# Patient Record
Sex: MALE | Race: WHITE | NOT HISPANIC OR LATINO | Employment: UNEMPLOYED | URBAN - NONMETROPOLITAN AREA
[De-identification: names, ages, dates, MRNs, and addresses within clinical notes are randomized per-mention and may not be internally consistent; named-entity substitution may affect disease eponyms.]

---

## 2022-08-08 ENCOUNTER — OFFICE VISIT (OUTPATIENT)
Dept: URGENT CARE | Facility: CLINIC | Age: 62
End: 2022-08-08
Payer: MEDICARE

## 2022-08-08 VITALS
SYSTOLIC BLOOD PRESSURE: 164 MMHG | HEART RATE: 98 BPM | OXYGEN SATURATION: 99 % | WEIGHT: 163 LBS | RESPIRATION RATE: 18 BRPM | DIASTOLIC BLOOD PRESSURE: 98 MMHG | TEMPERATURE: 98.5 F

## 2022-08-08 DIAGNOSIS — L02.511 ABSCESS OF RIGHT RING FINGER: Primary | ICD-10-CM

## 2022-08-08 PROCEDURE — 99203 OFFICE O/P NEW LOW 30 MIN: CPT

## 2022-08-08 PROCEDURE — 87205 SMEAR GRAM STAIN: CPT

## 2022-08-08 PROCEDURE — 10060 I&D ABSCESS SIMPLE/SINGLE: CPT

## 2022-08-08 PROCEDURE — 87070 CULTURE OTHR SPECIMN AEROBIC: CPT

## 2022-08-08 PROCEDURE — 87106 FUNGI IDENTIFICATION YEAST: CPT

## 2022-08-08 PROCEDURE — G0463 HOSPITAL OUTPT CLINIC VISIT: HCPCS

## 2022-08-08 PROCEDURE — 87077 CULTURE AEROBIC IDENTIFY: CPT

## 2022-08-08 PROCEDURE — 87186 SC STD MICRODIL/AGAR DIL: CPT

## 2022-08-08 RX ORDER — CEPHALEXIN 500 MG/1
500 CAPSULE ORAL EVERY 8 HOURS SCHEDULED
Qty: 30 CAPSULE | Refills: 0 | Status: SHIPPED | OUTPATIENT
Start: 2022-08-08 | End: 2022-08-18

## 2022-08-08 RX ORDER — LIDOCAINE HYDROCHLORIDE 10 MG/ML
5 INJECTION, SOLUTION EPIDURAL; INFILTRATION; INTRACAUDAL; PERINEURAL ONCE
Status: COMPLETED | OUTPATIENT
Start: 2022-08-08 | End: 2022-08-08

## 2022-08-08 RX ADMIN — LIDOCAINE HYDROCHLORIDE 5 ML: 10 INJECTION, SOLUTION EPIDURAL; INFILTRATION; INTRACAUDAL; PERINEURAL at 13:28

## 2022-08-08 NOTE — PROGRESS NOTES
[facility]        NAME: Eloy Hemphill is a 62 y o  male  : 1960    MRN: [id number]  DATE: 2022  TIME: 1:42 PM    Assessment and Plan   Abscess of right ring finger [L02 511]  1  Abscess of right ring finger  cephalexin (KEFLEX) 500 mg capsule    Wound culture and Gram stain    lidocaine (PF) (XYLOCAINE-MPF) 1 % injection 5 mL    Ambulatory Referral to Hand Surgery      I&D performed  Wound culture sent    Patient Instructions     Take the antibiotics with food  Take a probiotics or eat yogurt with live cultures to promote gut health  Keep the area clean and dry  Follow up with general surgery  Incision and drain    Date/Time: 2022 1:40 PM  Performed by: ZAID Bhakta  Authorized by: ZAID Pimentel   Universal Protocol:  Risks and benefits: risks, benefits and alternatives were discussed  Consent given by: patient  Time out: Immediately prior to procedure a "time out" was called to verify the correct patient, procedure, equipment, support staff and site/side marked as required  Patient location:  Bedside  Location:     Type:  Abscess    Size:  2x2     Location:  Upper extremity    Upper extremity location:  R ring finger  Pre-procedure details:     Skin preparation:  Betadine  Procedure details:     Complexity:  Simple    Incision types:  Stab incision    Scalpel blade:  11    Approach:  Puncture    Incision depth:  Subcutaneous    Wound management:  Irrigated with saline    Drainage:  Purulent    Drainage amount:  Copious    Wound treatment:  Wound left open  Post-procedure details:     Patient tolerance of procedure: Tolerated well, no immediate complications        Follow up with PCP in 3-5 days  Proceed to  ER if symptoms worsen      Chief Complaint     Chief Complaint   Patient presents with    Abscess     Abscess like area noted to right hand x1 finger         History of Present Illness       Patient is a 61YOM presenting with an abscess on his right ring finger  He states he had a cyst in the same spot but over the last 3 weeks he has had increased redness, swelling and drainage  He denies any fever, chills, numbness or tingling  He has been taking Dual Action tylenol with minor relief  He denies any injury  Denies any history of MRSA  Abscess  Associated symptoms include arthralgias and joint swelling  Pertinent negatives include no chills, fatigue, fever, headaches, numbness or weakness  Review of Systems   Review of Systems   Constitutional: Negative for activity change, appetite change, chills, fatigue and fever  Musculoskeletal: Positive for arthralgias and joint swelling  Skin: Positive for wound  Neurological: Negative for weakness, numbness and headaches  All other systems reviewed and are negative  Current Medications       Current Outpatient Medications:     cephalexin (KEFLEX) 500 mg capsule, Take 1 capsule (500 mg total) by mouth every 8 (eight) hours for 10 days, Disp: 30 capsule, Rfl: 0  No current facility-administered medications for this visit  Current Allergies     Allergies as of 08/08/2022    (Not on File)            The following portions of the patient's history were reviewed and updated as appropriate: allergies, current medications, past family history, past medical history, past social history, past surgical history and problem list      No past medical history on file  No past surgical history on file  No family history on file  Medications have been verified  Objective   There were no vitals taken for this visit  Physical Exam     Physical Exam  Vitals and nursing note reviewed  Constitutional:       General: He is not in acute distress  Appearance: Normal appearance  He is normal weight  He is not ill-appearing or toxic-appearing  HENT:      Mouth/Throat:      Pharynx: Oropharynx is clear  Cardiovascular:      Rate and Rhythm: Normal rate and regular rhythm        Pulses: Normal pulses  Heart sounds: Normal heart sounds  Pulmonary:      Effort: Pulmonary effort is normal       Breath sounds: Normal breath sounds  Musculoskeletal:         General: Normal range of motion  Skin:     Capillary Refill: Capillary refill takes less than 2 seconds  Findings: Abscess (erythematous, edematous area between the distal interphalangeal joint and the proximal interphalangeal joint with approximate 1x1 area of fluctuance  no drainage ) present  Neurological:      General: No focal deficit present  Mental Status: He is alert and oriented to person, place, and time

## 2022-08-08 NOTE — PATIENT INSTRUCTIONS
Take the antibiotics with food  Take a probiotics or eat yogurt with live cultures to promote gut health  Keep the area clean and dry  Follow up with general surgery

## 2022-08-12 ENCOUNTER — TELEPHONE (OUTPATIENT)
Dept: URGENT CARE | Facility: MEDICAL CENTER | Age: 62
End: 2022-08-12

## 2022-08-12 ENCOUNTER — OFFICE VISIT (OUTPATIENT)
Dept: URGENT CARE | Facility: CLINIC | Age: 62
End: 2022-08-12
Payer: COMMERCIAL

## 2022-08-12 VITALS
HEART RATE: 91 BPM | DIASTOLIC BLOOD PRESSURE: 92 MMHG | OXYGEN SATURATION: 100 % | TEMPERATURE: 98.1 F | RESPIRATION RATE: 16 BRPM | SYSTOLIC BLOOD PRESSURE: 149 MMHG

## 2022-08-12 DIAGNOSIS — L02.511 ABSCESS OF FINGER, RIGHT: Primary | ICD-10-CM

## 2022-08-12 DIAGNOSIS — L72.3 SEBACEOUS CYST OF FINGER: ICD-10-CM

## 2022-08-12 DIAGNOSIS — B37.2 YEAST INFECTION OF THE SKIN: Primary | ICD-10-CM

## 2022-08-12 LAB
BACTERIA WND AEROBE CULT: ABNORMAL
BACTERIA WND AEROBE CULT: ABNORMAL
GRAM STN SPEC: ABNORMAL
GRAM STN SPEC: ABNORMAL

## 2022-08-12 PROCEDURE — 99214 OFFICE O/P EST MOD 30 MIN: CPT

## 2022-08-12 PROCEDURE — 10060 I&D ABSCESS SIMPLE/SINGLE: CPT

## 2022-08-12 RX ORDER — FLUCONAZOLE 200 MG/1
200 TABLET ORAL DAILY
Qty: 7 TABLET | Refills: 0 | Status: SHIPPED | OUTPATIENT
Start: 2022-08-12 | End: 2022-08-19

## 2022-08-12 RX ORDER — SULFAMETHOXAZOLE AND TRIMETHOPRIM 800; 160 MG/1; MG/1
1 TABLET ORAL EVERY 12 HOURS SCHEDULED
Qty: 14 TABLET | Refills: 0 | Status: SHIPPED | OUTPATIENT
Start: 2022-08-12 | End: 2022-08-19

## 2022-08-12 NOTE — TELEPHONE ENCOUNTER
The wound culture grew yeast  I placed him on keflex on his initial visit  He was seen again today and had an I&D  He was placed on Bactrim  The culture was sensitive to Keflex and Bactrim  I explained to the patient that he needs to be covered with an antifungal medication in addition to the Bactrim  He verbalized understanding

## 2022-08-12 NOTE — PROGRESS NOTES
[facility]        NAME: Anjana Sena is a 62 y o  male  : 1960    MRN: [id number]  DATE: 2022  TIME: 2:05 PM    Assessment and Plan   Abscess of finger, right [L02 511]  1  Abscess of finger, right  sulfamethoxazole-trimethoprim (BACTRIM DS) 800-160 mg per tablet   2  Sebaceous cyst of finger  Ambulatory Referral to General Surgery     MDM:  Return of abscess on finger, wants incision was made to drain, tract was found and straight line made into sebaceous cyst to drain all purulent material     Bradley Sheppard  NP called with results of wound culture stating that it grew fungus, specifically Candida, and she was going to prescribe fluconazole orally and call the patient  Arben Trinidad will tell the patient to stay on Bactrim and take fluconazole together  Patient Instructions     Abscess on right hand 4th digit  - Take 1 tablet of TMP/SMX 2x a day with food for the next 7 days  - You have been given an antibiotic, which a common side effect is diarrhea  Eat yogurt, fresh fruits and veggies, increase daily fiber intake, take probiotics, or try kombucha    - Monitor for signs of infection, which include: redness, swelling, pustules, warmth to touch, you develop a fever, pain increases, you see purple dots/bumps on skin or bleeding under skin, there is crackling under skin  Call the office should these be present  - Prevent skin infections by washing hands frequently, not scratching at scabs, cleaning wounds when you get them, and not sharing personal items such as razors  Sebaceous cyst  - Follow up with general surgeon for consideration of removal      Follow up with PCP in 3-5 days  Proceed to  ER if symptoms worsen      Chief Complaint     Chief Complaint   Patient presents with    Abscess     Right finger  Drained at [facility] on Monday         History of Present Illness     Anjana Sena is a 62 y o  male who presents with complaint of continued right hand 4th digit finger pain after being seen here 4 days ago when he received abscess drainage via I&D with bleed  He states that he is taking cephalexin 3 times a day, and has taken his medications as prescribed  He reports a sebaceous cyst located adjacent, medially, to the abscess and infection  He reports that he is  to the palpation  He denies fevers, chills, night sweats, paresthesias, weakness  Noticed that his sebaceous cyst has also grown in size  Review of Systems   Review of Systems   Constitutional: Negative for chills, fatigue and fever  Respiratory: Negative for cough and shortness of breath  Cardiovascular: Negative for chest pain and palpitations  Gastrointestinal: Negative for abdominal pain, constipation, diarrhea, nausea and vomiting  Skin: Positive for color change and wound  Negative for pallor and rash  Neurological: Negative for headaches  All other systems reviewed and are negative  Current Medications       Current Outpatient Medications:     sulfamethoxazole-trimethoprim (BACTRIM DS) 800-160 mg per tablet, Take 1 tablet by mouth every 12 (twelve) hours for 7 days, Disp: 14 tablet, Rfl: 0    cephalexin (KEFLEX) 500 mg capsule, Take 1 capsule (500 mg total) by mouth every 8 (eight) hours for 10 days, Disp: 30 capsule, Rfl: 0    fluconazole (DIFLUCAN) 200 mg tablet, Take 1 tablet (200 mg total) by mouth daily for 7 days, Disp: 7 tablet, Rfl: 0    Current Allergies     Allergies as of 08/12/2022    (No Known Allergies)            The following portions of the patient's history were reviewed and updated as appropriate: allergies, current medications, past family history, past medical history, past social history, past surgical history and problem list      Past Medical History:   Diagnosis Date    Known health problems: none        Past Surgical History:   Procedure Laterality Date    COLON SURGERY         History reviewed  No pertinent family history        Medications have been verified  Objective   /92   Pulse 91   Temp 98 1 °F (36 7 °C)   Resp 16   SpO2 100%   No LMP for male patient  Physical Exam     Physical Exam  Vitals and nursing note reviewed  Constitutional:       General: He is awake  He is not in acute distress  Appearance: Normal appearance  He is well-developed, well-groomed and normal weight  He is not ill-appearing  Cardiovascular:      Rate and Rhythm: Normal rate and regular rhythm  Heart sounds: Normal heart sounds  Pulmonary:      Effort: Pulmonary effort is normal       Breath sounds: Normal breath sounds  No wheezing, rhonchi or rales  Musculoskeletal:      Right wrist: Normal  No tenderness  Normal range of motion  Left wrist: Normal  No tenderness  Normal range of motion  Right hand: Swelling and tenderness present  No bony tenderness  Normal range of motion  Normal strength  Normal sensation  Normal capillary refill  Normal pulse  Left hand: Normal  No tenderness or bony tenderness  Normal range of motion  Normal strength  Normal sensation  Normal capillary refill  Normal pulse  Hands:    Neurological:      Mental Status: He is alert  Psychiatric:         Behavior: Behavior is cooperative  Incision and drain    Date/Time: 8/12/2022 1:53 PM  Performed by: Gila Robles PA-C  Authorized by: Gila Robles PA-C   Universal Protocol:  Consent: Verbal consent obtained    Risks and benefits: risks, benefits and alternatives were discussed  Consent given by: patient  Patient understanding: patient states understanding of the procedure being performed  Patient consent: the patient's understanding of the procedure matches consent given  Patient identity confirmed: verbally with patient      Patient location:  Clinic  Location:     Type:  Abscess    Size:  1cm    Location:  Upper extremity    Upper extremity location:  R ring finger  Pre-procedure details:     Skin preparation:  Betadine Skin preparation:  Alcohol prep pad for anesthesia, then betadine wipe to area that would receive cuts  Anesthesia (see MAR for exact dosages): Anesthesia method:  Nerve block    Needle gauge: 21G  Block anesthetic:  Lidocaine 1% WITH epi    Block technique:  Right hand 4th digit nerve block    Block injection procedure:  Introduced needle, incremental injection and anatomic landmarks palpated    Block outcome:  Anesthesia achieved  Procedure details:     Complexity:  Simple    Needle aspiration: no      Incision types:  Cruciate and single straight (single cut to abscess site, however tracking with noted into the epidermal cyst, so line extended into cyst wall to express purulent material and care 10 inside, two perpendicular cruciate cuts were made to further assist in expiring material )    Scalpel blade:  11    Approach:  Open    Incision depth:  Dermal    Wound management:  Probed and deloculated and irrigated with saline    Drainage:  Bloody and purulent    Drainage amount:  Scant    Wound treatment:  Wound left open    Packing materials:  None  Post-procedure details:     Patient tolerance of procedure: Tolerated well, no immediate complications  Comments:      Nerve block administered by applying tourniquet to right hand 4th digit and using 2 cc of lidocaine with epi  While awaiting numbness, 1 Betadine swab was applied to the distal and dorsal aspect of the right hand 4th digit to the incision site  An 11 blade scalp was used to puncture the abscess wall on the lateral aspect of the finger, after probing it was found that it tracked into the sebaceous cyst, so a single straight line was cut from the abscess location through to the sebaceous cyst   Attempted drainage using manual manipulation was performed which resulted in purulent and bloody material being expressed with 4 x 4 sterile gauze    Some cottage cheese like/keratin material was removed from the sebaceous cyst   Using a suture removal kit forceps, the area was debrided and then lavaged with a single saline flush  Area was wrapped in white gauze and then taped

## 2022-08-12 NOTE — PATIENT INSTRUCTIONS
Abscess on right hand 4th digit  - Take 1 tablet of TMP/SMX 2x a day with food for the next 7 days  - You have been given an antibiotic, which a common side effect is diarrhea  Eat yogurt, fresh fruits and veggies, increase daily fiber intake, take probiotics, or try kombucha    - Monitor for signs of infection, which include: redness, swelling, pustules, warmth to touch, you develop a fever, pain increases, you see purple dots/bumps on skin or bleeding under skin, there is crackling under skin  Call the office should these be present  - Prevent skin infections by washing hands frequently, not scratching at scabs, cleaning wounds when you get them, and not sharing personal items such as razors      Sebaceous cyst  - Follow up with general surgeon for consideration of removal

## 2022-08-29 ENCOUNTER — OFFICE VISIT (OUTPATIENT)
Dept: URGENT CARE | Facility: CLINIC | Age: 62
End: 2022-08-29
Payer: MEDICARE

## 2022-08-29 VITALS
DIASTOLIC BLOOD PRESSURE: 64 MMHG | SYSTOLIC BLOOD PRESSURE: 134 MMHG | HEART RATE: 106 BPM | RESPIRATION RATE: 16 BRPM | OXYGEN SATURATION: 99 % | TEMPERATURE: 98.7 F

## 2022-08-29 DIAGNOSIS — L02.511 ABSCESS OF RIGHT RING FINGER: Primary | ICD-10-CM

## 2022-08-29 PROCEDURE — 10060 I&D ABSCESS SIMPLE/SINGLE: CPT

## 2022-08-29 PROCEDURE — 87186 SC STD MICRODIL/AGAR DIL: CPT

## 2022-08-29 PROCEDURE — G0463 HOSPITAL OUTPT CLINIC VISIT: HCPCS

## 2022-08-29 PROCEDURE — 87205 SMEAR GRAM STAIN: CPT

## 2022-08-29 PROCEDURE — 87070 CULTURE OTHR SPECIMN AEROBIC: CPT

## 2022-08-29 PROCEDURE — 99213 OFFICE O/P EST LOW 20 MIN: CPT

## 2022-08-29 RX ORDER — SULFAMETHOXAZOLE AND TRIMETHOPRIM 800; 160 MG/1; MG/1
1 TABLET ORAL EVERY 12 HOURS SCHEDULED
Qty: 28 TABLET | Refills: 0 | Status: SHIPPED | OUTPATIENT
Start: 2022-08-29 | End: 2022-09-08 | Stop reason: SDUPTHER

## 2022-08-29 NOTE — PATIENT INSTRUCTIONS
Carbuncle on right hand 4th digit  - take 1 capsule of Bactrim 2 times a day with food for the next 14 days  - You have been given an antibiotic, which a common side effect is diarrhea  Eat yogurt, fresh fruits and veggies, increase daily fiber intake, take probiotics, or try kombucha  - monitor for worsening infection  - Monitor for signs of infection, which include: redness, swelling, pustules, warmth to touch, you develop a fever, pain increases, you see purple dots/bumps on skin or bleeding under skin, there is crackling under skin  Call the office should these be present  - Prevent skin infections by washing hands frequently, not scratching at scabs, cleaning wounds when you get them, and not sharing personal items such as razors

## 2022-08-29 NOTE — PROGRESS NOTES
[facility]        NAME: Fatmata Kaur is a 62 y o  male  : 1960    MRN: [id number]  DATE: 2022  TIME: 6:11 PM    Assessment and Plan   Abscess of right ring finger [L02 511]  1  Abscess of right ring finger  sulfamethoxazole-trimethoprim (BACTRIM DS) 800-160 mg per tablet    Wound culture and Gram stain         Patient Instructions     Carbuncle on right hand 4th digit  - take 1 capsule of Bactrim 2 times a day with food for the next 14 days  - You have been given an antibiotic, which a common side effect is diarrhea  Eat yogurt, fresh fruits and veggies, increase daily fiber intake, take probiotics, or try kombucha  - monitor for worsening infection  - Monitor for signs of infection, which include: redness, swelling, pustules, warmth to touch, you develop a fever, pain increases, you see purple dots/bumps on skin or bleeding under skin, there is crackling under skin  Call the office should these be present  - Prevent skin infections by washing hands frequently, not scratching at scabs, cleaning wounds when you get them, and not sharing personal items such as razors  Follow up with PCP in 3-5 days  Proceed to  ER if symptoms worsen  Chief Complaint     Chief Complaint   Patient presents with    Abscess     Non healing abscess on right finger         History of Present Illness     Fatmata Kaur is a 62 y o  male with history significant for colectomy who presents with complaint of nonhealing and worsening right hand 4th digit abscess for the past week  He was seen here 3 weeks ago and treated for bacterial and fungal abscess infection with cephalexin, fluconazole, and I&D  He said that the 1st 2 weeks of treatment, his wound and finger improved, but then after he stopped the cephalexin and fluconazole, his symptoms worsened  He has an appointment with a hand surgeon 3 days for now  He reports redness, swelling, purulent drainage, pain, and warmth    He has reduced range of motion of his right hand 4th digit with flexion  He denies fevers, paresthesias, or paralysis  Review of Systems   Review of Systems   Constitutional: Negative for chills, fatigue and fever  Respiratory: Negative for cough and shortness of breath  Cardiovascular: Negative for chest pain and palpitations  Gastrointestinal: Negative for abdominal pain, constipation, diarrhea, nausea and vomiting  Skin: Positive for color change and wound  Negative for pallor and rash  Neurological: Negative for headaches  All other systems reviewed and are negative  Current Medications       Current Outpatient Medications:     sulfamethoxazole-trimethoprim (BACTRIM DS) 800-160 mg per tablet, Take 1 tablet by mouth every 12 (twelve) hours for 14 days, Disp: 28 tablet, Rfl: 0    Current Allergies     Allergies as of 08/29/2022    (No Known Allergies)            The following portions of the patient's history were reviewed and updated as appropriate: allergies, current medications, past family history, past medical history, past social history, past surgical history and problem list      Past Medical History:   Diagnosis Date    Known health problems: none        Past Surgical History:   Procedure Laterality Date    COLON SURGERY         History reviewed  No pertinent family history  Medications have been verified  Objective   /64   Pulse (!) 106   Temp 98 7 °F (37 1 °C)   Resp 16   SpO2 99%   No LMP for male patient  Physical Exam     Physical Exam  Vitals and nursing note reviewed  Constitutional:       General: He is awake  He is not in acute distress  Appearance: Normal appearance  He is well-developed, well-groomed and normal weight  He is not ill-appearing  Cardiovascular:      Rate and Rhythm: Normal rate and regular rhythm  Heart sounds: Normal heart sounds  Pulmonary:      Effort: Pulmonary effort is normal       Breath sounds: Normal breath sounds   No wheezing, rhonchi or rales  Musculoskeletal:      Right hand: Swelling (About 2 cm in circumference larger than other digits) and tenderness ( atop the dorsal distal aspect of the 4th digit) present  No lacerations or bony tenderness  Decreased range of motion ( DIP joint of the 4th digit)  Normal strength  Normal sensation  Normal capillary refill  Normal pulse  Left hand: Normal  No swelling, lacerations, tenderness or bony tenderness  Normal range of motion  Normal strength  Normal sensation  Normal capillary refill  Normal pulse  Skin:     General: Skin is warm  Capillary Refill: Capillary refill takes less than 2 seconds  Findings: Abscess, erythema and wound present  Comments: Right hand 4th digit dorsal aspect with to wound sites from prior incision and drainage on the aspect of the finger closest to the 3rd digit  There is purulent drainage from both of these wound sites  Fingers extremely tender to palpation  Fluctuance is present on the dorsal aspect as well as the volar aspect on the lateral aspect of the right hand 4th digit  Neurological:      Mental Status: He is alert  Psychiatric:         Behavior: Behavior is cooperative  Incision and drain    Date/Time: 8/29/2022 6:04 PM  Performed by: Zandra Douglass PA-C  Authorized by: Zandra Douglass PA-C   Universal Protocol:  Consent: Verbal consent obtained    Risks and benefits: risks, benefits and alternatives were discussed  Consent given by: patient  Patient understanding: patient states understanding of the procedure being performed  Patient consent: the patient's understanding of the procedure matches consent given  Patient identity confirmed: verbally with patient      Patient location:  Clinic  Location:     Type:  Abscess    Size:  2-4 cm    Location:  Upper extremity    Upper extremity location:  R ring finger  Pre-procedure details:     Skin preparation:  Betadine  Anesthesia (see MAR for exact dosages): Anesthesia method:  Nerve block and local infiltration    Local anesthetic:  Lidocaine 1% WITH epi    Block location:  Digital block to right hand 4th digit    Block needle gauge:  27 G    Block anesthetic:  Lidocaine 1% WITH epi    Block injection procedure:  Anatomic landmarks identified and introduced needle    Block outcome:  Anesthesia achieved (First attempt at nerve block was unsuccessful, 2nd attempt was successful)  Procedure details:     Complexity:  Intermediate    Needle aspiration: no      Incision types:  Cruciate    Scalpel blade:  11    Approach:  Open    Incision depth:  Dermal    Wound management:  Probed and deloculated and irrigated with saline    Irrigation with saline:  About 150 mL to 200 mL    Drainage:  Purulent, bloody and serosanguinous    Drainage amount: Moderate    Wound treatment:  Wound left open    Packing materials:  None  Post-procedure details:     Patient tolerance of procedure: Tolerated well, no immediate complications  Comments:      Base of right hand 4th digit cleaned with alcohol prep pad, injected on lateral and medial aspect of finger to anesthetize nerves with 1% lidocaine with epi using an 18 gauge filter new to to drop anesthetic in a 3 cc syringe and then transferring needle to a 27 gauge needle where about 1 5 cc of anesthetic was administered  Anesthesia was not achieved, so another 1 cc was administered locally on the proximal aspect of the wound site about the D IP joint  This was also unsuccessful  Manual manipulation of the dorsal aspect of the right hand 4th digit was performed to achieve some purulent discharge in serosanguineous discharge, which is when a wound culture was obtained  Further palpation of the digit revealed fluctuance on the 5th digit aspect of the 4th digit volar area    Using a constricting band, the right hand 4th digit was tourniqueted and then 2 cc of lidocaine with epinephrine or injected into the base of the finger performing another nerve block which was successful  And I&D was performed on the 5th digit aspect of the 4th digit in a cruciate incision pattern, about 0 5 cm in length by 0 25 cm in width  Purulent drainage was achieved, there was also cottage cheese like discharge  The wound was deloculated using wound culture probes  The wound site was further manually manipulated then irrigated with 150-200 mL of normal saline using a 20 cc syringe

## 2022-08-31 LAB
BACTERIA WND AEROBE CULT: ABNORMAL
GRAM STN SPEC: ABNORMAL
GRAM STN SPEC: ABNORMAL

## 2022-09-08 ENCOUNTER — CONSULT (OUTPATIENT)
Dept: SURGERY | Facility: CLINIC | Age: 62
End: 2022-09-08
Payer: COMMERCIAL

## 2022-09-08 ENCOUNTER — HOSPITAL ENCOUNTER (INPATIENT)
Facility: HOSPITAL | Age: 62
LOS: 7 days | Discharge: HOME HEALTH | DRG: 580 | End: 2022-09-16
Attending: EMERGENCY MEDICINE | Admitting: INTERNAL MEDICINE
Payer: MEDICARE

## 2022-09-08 ENCOUNTER — APPOINTMENT (OUTPATIENT)
Dept: RADIOLOGY | Facility: HOSPITAL | Age: 62
DRG: 580 | End: 2022-09-08
Payer: MEDICARE

## 2022-09-08 ENCOUNTER — HOSPITAL ENCOUNTER (OUTPATIENT)
Dept: RADIOLOGY | Facility: HOSPITAL | Age: 62
Discharge: HOME | End: 2022-09-08
Payer: MEDICARE

## 2022-09-08 VITALS
DIASTOLIC BLOOD PRESSURE: 98 MMHG | HEART RATE: 88 BPM | TEMPERATURE: 98.9 F | SYSTOLIC BLOOD PRESSURE: 164 MMHG | WEIGHT: 158 LBS

## 2022-09-08 DIAGNOSIS — L72.3 SEBACEOUS CYST OF FINGER: ICD-10-CM

## 2022-09-08 DIAGNOSIS — M19.90 INFLAMMATORY ARTHROPATHY: Primary | ICD-10-CM

## 2022-09-08 DIAGNOSIS — L02.511 ABSCESS OF RIGHT RING FINGER: Primary | ICD-10-CM

## 2022-09-08 DIAGNOSIS — D69.6 THROMBOCYTOPENIA (HCC): ICD-10-CM

## 2022-09-08 DIAGNOSIS — L02.511 ABSCESS OF RIGHT RING FINGER: ICD-10-CM

## 2022-09-08 DIAGNOSIS — M10.9 GOUT: ICD-10-CM

## 2022-09-08 LAB
ANION GAP SERPL CALCULATED.3IONS-SCNC: 9 MMOL/L (ref 4–13)
BASOPHILS # BLD AUTO: 0.04 THOUSANDS/ΜL (ref 0–0.1)
BASOPHILS NFR BLD AUTO: 1 % (ref 0–1)
BUN SERPL-MCNC: 31 MG/DL (ref 5–25)
CALCIUM SERPL-MCNC: 9.5 MG/DL (ref 8.3–10.1)
CHLORIDE SERPL-SCNC: 99 MMOL/L (ref 96–108)
CO2 SERPL-SCNC: 21 MMOL/L (ref 21–32)
CREAT SERPL-MCNC: 2.18 MG/DL (ref 0.6–1.3)
CRP SERPL QL: 39.1 MG/L
EOSINOPHIL # BLD AUTO: 0.04 THOUSAND/ΜL (ref 0–0.61)
EOSINOPHIL NFR BLD AUTO: 1 % (ref 0–6)
ERYTHROCYTE [DISTWIDTH] IN BLOOD BY AUTOMATED COUNT: 12.9 % (ref 11.6–15.1)
ERYTHROCYTE [SEDIMENTATION RATE] IN BLOOD: 57 MM/HOUR (ref 0–19)
GFR SERPL CREATININE-BSD FRML MDRD: 31 ML/MIN/1.73SQ M
GLUCOSE SERPL-MCNC: 154 MG/DL (ref 65–140)
HCT VFR BLD AUTO: 36.1 % (ref 36.5–49.3)
HGB BLD-MCNC: 12.5 G/DL (ref 12–17)
IMM GRANULOCYTES # BLD AUTO: 0.02 THOUSAND/UL (ref 0–0.2)
IMM GRANULOCYTES NFR BLD AUTO: 0 % (ref 0–2)
LYMPHOCYTES # BLD AUTO: 0.62 THOUSANDS/ΜL (ref 0.6–4.47)
LYMPHOCYTES NFR BLD AUTO: 9 % (ref 14–44)
MCH RBC QN AUTO: 41 PG (ref 26.8–34.3)
MCHC RBC AUTO-ENTMCNC: 34.6 G/DL (ref 31.4–37.4)
MCV RBC AUTO: 118 FL (ref 82–98)
MONOCYTES # BLD AUTO: 0.23 THOUSAND/ΜL (ref 0.17–1.22)
MONOCYTES NFR BLD AUTO: 3 % (ref 4–12)
NEUTROPHILS # BLD AUTO: 6.13 THOUSANDS/ΜL (ref 1.85–7.62)
NEUTS SEG NFR BLD AUTO: 86 % (ref 43–75)
NRBC BLD AUTO-RTO: 0 /100 WBCS
PLATELET # BLD AUTO: 109 THOUSANDS/UL (ref 149–390)
PMV BLD AUTO: 9.9 FL (ref 8.9–12.7)
POTASSIUM SERPL-SCNC: 4.5 MMOL/L (ref 3.5–5.3)
RBC # BLD AUTO: 3.05 MILLION/UL (ref 3.88–5.62)
SODIUM SERPL-SCNC: 129 MMOL/L (ref 135–147)
URATE SERPL-MCNC: 7.8 MG/DL (ref 3.5–8.5)
WBC # BLD AUTO: 7.08 THOUSAND/UL (ref 4.31–10.16)

## 2022-09-08 PROCEDURE — 99285 EMERGENCY DEPT VISIT HI MDM: CPT | Performed by: EMERGENCY MEDICINE

## 2022-09-08 PROCEDURE — 85652 RBC SED RATE AUTOMATED: CPT | Performed by: EMERGENCY MEDICINE

## 2022-09-08 PROCEDURE — 80048 BASIC METABOLIC PNL TOTAL CA: CPT | Performed by: EMERGENCY MEDICINE

## 2022-09-08 PROCEDURE — 85025 COMPLETE CBC W/AUTO DIFF WBC: CPT | Performed by: EMERGENCY MEDICINE

## 2022-09-08 PROCEDURE — 84550 ASSAY OF BLOOD/URIC ACID: CPT | Performed by: EMERGENCY MEDICINE

## 2022-09-08 PROCEDURE — 73220 MRI UPPR EXTREMITY W/O&W/DYE: CPT

## 2022-09-08 PROCEDURE — G1004 CDSM NDSC: HCPCS

## 2022-09-08 PROCEDURE — 96361 HYDRATE IV INFUSION ADD-ON: CPT

## 2022-09-08 PROCEDURE — 99205 OFFICE O/P NEW HI 60 MIN: CPT

## 2022-09-08 PROCEDURE — 96375 TX/PRO/DX INJ NEW DRUG ADDON: CPT

## 2022-09-08 PROCEDURE — 99284 EMERGENCY DEPT VISIT MOD MDM: CPT

## 2022-09-08 PROCEDURE — 73130 X-RAY EXAM OF HAND: CPT

## 2022-09-08 PROCEDURE — 86140 C-REACTIVE PROTEIN: CPT | Performed by: EMERGENCY MEDICINE

## 2022-09-08 PROCEDURE — 36415 COLL VENOUS BLD VENIPUNCTURE: CPT | Performed by: EMERGENCY MEDICINE

## 2022-09-08 RX ORDER — SULFAMETHOXAZOLE AND TRIMETHOPRIM 800; 160 MG/1; MG/1
1 TABLET ORAL EVERY 12 HOURS SCHEDULED
Qty: 14 TABLET | Refills: 0 | Status: SHIPPED | OUTPATIENT
Start: 2022-09-08 | End: 2022-09-16

## 2022-09-08 RX ORDER — KETOROLAC TROMETHAMINE 30 MG/ML
15 INJECTION, SOLUTION INTRAMUSCULAR; INTRAVENOUS ONCE
Status: COMPLETED | OUTPATIENT
Start: 2022-09-08 | End: 2022-09-08

## 2022-09-08 RX ORDER — INDOMETHACIN 75 MG/1
75 CAPSULE, EXTENDED RELEASE ORAL 2 TIMES DAILY WITH MEALS
Qty: 20 CAPSULE | Refills: 0 | Status: SHIPPED | OUTPATIENT
Start: 2022-09-08 | End: 2022-09-16

## 2022-09-08 RX ORDER — VANCOMYCIN HYDROCHLORIDE 1 G/200ML
15 INJECTION, SOLUTION INTRAVENOUS EVERY 12 HOURS
Status: DISCONTINUED | OUTPATIENT
Start: 2022-09-08 | End: 2022-09-08

## 2022-09-08 RX ORDER — VANCOMYCIN HYDROCHLORIDE 1 G/200ML
15 INJECTION, SOLUTION INTRAVENOUS DAILY PRN
Status: DISCONTINUED | OUTPATIENT
Start: 2022-09-09 | End: 2022-09-11

## 2022-09-08 RX ORDER — COLCHICINE 0.6 MG/1
0.6 TABLET ORAL DAILY
COMMUNITY

## 2022-09-08 RX ORDER — INDOMETHACIN 25 MG/1
25 CAPSULE ORAL 2 TIMES DAILY WITH MEALS
COMMUNITY
End: 2022-09-08

## 2022-09-08 RX ORDER — ACETAMINOPHEN 325 MG/1
650 TABLET ORAL ONCE
Status: COMPLETED | OUTPATIENT
Start: 2022-09-08 | End: 2022-09-08

## 2022-09-08 RX ADMIN — ACETAMINOPHEN 650 MG: 325 TABLET ORAL at 20:33

## 2022-09-08 RX ADMIN — SODIUM CHLORIDE 1000 ML: 0.9 INJECTION, SOLUTION INTRAVENOUS at 21:37

## 2022-09-08 RX ADMIN — KETOROLAC TROMETHAMINE 15 MG: 30 INJECTION, SOLUTION INTRAMUSCULAR at 20:33

## 2022-09-08 NOTE — ED NOTES
Per pt "was told to come for iv abx, gout infection in my right ring finger down to my bone and another gout infection in my right middle finger, they tried po abx but it didn't work so they sent me in here"     Brenda Keys, RAMIN  09/08/22 1955

## 2022-09-08 NOTE — PROGRESS NOTES
Assessment/Plan:    Abscess of right ring finger  Swollen painful right ring finger x2 months  Patient was seen in urgent care in [facility] about 2 weeks ago and had incision and drainage of the DIP joint of the right 4th finger on the dorsal aspect  Patient denies any fever or chills  Patient has known gout and had previously been on allopurinol which is not currently taking  He has swelling of the MCP joint of the right 3rd finger  Patient was seen in urgent care yesterday and was referred to General Surgery for possible incision and drainage  The patient was placed on Bactrim DS 2 weeks ago which she has been taking now twice a day, the patient also has a few Indocin SR for the pain  Discussed via telephone with Venu Taylor PA-C, orthopedic physician assistant with Dr Tristin Billings  Photo was uploaded via secure [employer] messaging for his review  He recommended that the patient have evaluation by a hand specialist   Then proceeded with telephone conversation with Dr Oskar Ibarra, stat x-ray of the right hand was performed  RIGHT HAND     INDICATION:  L72 3: Sebaceous cyst      COMPARISON:  None     VIEWS:  XR HAND 3+ VW RIGHT   Images: 4     For the purposes of institution wide universal language the following terms will apply: (thumb=1st digit/finger, index finger=2nd digit/finger, long finger=3rd digit/finger, ring=4th digit/finger and small finger=5th digit/finger)     FINDINGS:     There is osteolytic/erosive changes involving the base of the 4th distal phalanx and head of the 4th middle phalanx        No other focal erosive changes are seen      There is no acute fracture or dislocation      Soft tissue swelling most notably about the 3rd PIP, 4th DIP and 2nd PIP joints  No soft tissue gas  Vascular calcifications         IMPRESSION:     Soft tissue swelling 2nd through 4th digits as above with osteolytic/erosive changes about the 4th DIP joint   Differential considerations regarding the 4th digit include gouty arthropathy, rheumatoid arthritis as well as infection (osteomyelitis)  Clinical correlation is necessary  Concern for possible osteomyelitis  Will order stat CBC, sed rate, uric acid level  Await orthopedic disposition from Dr Jack Adame whether not the patient should be seen here in the ED and admitted for IV antibiotics or sent to [facility] to be evaluated in the ED there  Problem List Items Addressed This Visit        Other    Abscess of right ring finger - Primary     Swollen painful right ring finger x2 months  Patient was seen in urgent care in [facility] about 2 weeks ago and had incision and drainage of the DIP joint of the right 4th finger on the dorsal aspect  Patient denies any fever or chills  Patient has known gout and had previously been on allopurinol which is not currently taking  He has swelling of the MCP joint of the right 3rd finger  Patient was seen in urgent care yesterday and was referred to General Surgery for possible incision and drainage  The patient was placed on Bactrim DS 2 weeks ago which she has been taking now twice a day, the patient also has a few Indocin SR for the pain  Discussed via telephone with Randy Gonzalez PA-C, orthopedic physician assistant with Dr Byrnes Milder  Photo was uploaded via secure [employer] messaging for his review  He recommended that the patient have evaluation by a hand specialist   Then proceeded with telephone conversation with Dr Norma Caban, stat x-ray of the right hand was performed      RIGHT HAND     INDICATION:  L72 3: Sebaceous cyst      COMPARISON:  None     VIEWS:  XR HAND 3+ VW RIGHT   Images: 4     For the purposes of institution wide universal language the following terms will apply: (thumb=1st digit/finger, index finger=2nd digit/finger, long finger=3rd digit/finger, ring=4th digit/finger and small finger=5th digit/finger)     FINDINGS:     There is osteolytic/erosive changes involving the base of the 4th distal phalanx and head of the 4th middle phalanx        No other focal erosive changes are seen      There is no acute fracture or dislocation      Soft tissue swelling most notably about the 3rd PIP, 4th DIP and 2nd PIP joints  No soft tissue gas  Vascular calcifications         IMPRESSION:     Soft tissue swelling 2nd through 4th digits as above with osteolytic/erosive changes about the 4th DIP joint  Differential considerations regarding the 4th digit include gouty arthropathy, rheumatoid arthritis as well as infection (osteomyelitis)  Clinical correlation is necessary  Concern for possible osteomyelitis  Will order stat CBC, sed rate, uric acid level  Await orthopedic disposition from Dr Maria Esther Blue whether not the patient should be seen here in the ED and admitted for IV antibiotics or sent to [place] to be evaluated in the ED there  Relevant Medications    sulfamethoxazole-trimethoprim (BACTRIM DS) 800-160 mg per tablet      Other Visit Diagnoses     Sebaceous cyst of finger        Relevant Orders    X-ray hand right 3+ views (Completed)    Gout        Relevant Medications    indomethacin (INDOCIN SR) 75 mg CR capsule    Other Relevant Orders    CBC and differential    Uric acid    Sedimentation rate, automated            Subjective:  Red swollen right ring finger and middle finger x2 weeks  He admits bumping the finger at home  He is right handed  Denies any fever or chills  Past medical history includes gout for which she is not taking any anti uric acid medication  Patient ID: Fatmata Kaur is a 62 y o  male  HPI     Two-month history of red swollen D IP on the right hand  He has known gout and has large gouty tophi on the right 3rd MCP joint  Denies any fever or chills  Patient was seen 2 weeks ago at urgent care in [facility], patient underwent incision and drainage of the dorsum of the right D IP joint    Patient was placed on Bactrim DS b i d  and also the patient had been taking indomethacin then at home  He has a history of gout in the past, he is not currently taking his allopurinol  He admits bumping his right ring finger about 2 weeks ago when the pain and swelling became worse  The following portions of the patient's history were reviewed and updated as appropriate:   He  has a past medical history of Known health problems: none  He   Patient Active Problem List    Diagnosis Date Noted    Abscess of right ring finger 09/08/2022     He  has a past surgical history that includes Colon surgery  His family history is not on file  He  reports that he has never smoked  He has never used smokeless tobacco  He reports that he does not drink alcohol and does not use drugs  Current Outpatient Medications   Medication Sig Dispense Refill    colchicine (COLCRYS) 0 6 mg tablet Take 0 6 mg by mouth daily      indomethacin (INDOCIN SR) 75 mg CR capsule Take 1 capsule (75 mg total) by mouth 2 (two) times a day with meals Take with food  20 capsule 0    sulfamethoxazole-trimethoprim (BACTRIM DS) 800-160 mg per tablet Take 1 tablet by mouth every 12 (twelve) hours for 7 days 14 tablet 0     No current facility-administered medications for this visit  He is allergic to cephalexin       Review of Systems   Constitutional: Negative for activity change, chills and fever  HENT: Negative  Eyes: Negative  Respiratory: Negative  Cardiovascular: Negative  Gastrointestinal: Negative  No history of ulcer disease  Endocrine: Negative  Genitourinary: Negative  Musculoskeletal: Positive for arthralgias and joint swelling  Negative for back pain, gait problem, neck pain and neck stiffness  Allergic/Immunologic: Negative  Neurological: Negative  Hematological: Negative  Psychiatric/Behavioral: Negative            Objective:      /98   Pulse 88   Temp 98 9 °F (37 2 °C)   Wt 71 7 kg (158 lb)          Physical Exam  Constitutional: Appearance: Normal appearance  He is not ill-appearing  HENT:      Head: Normocephalic  Nose: Nose normal       Mouth/Throat:      Mouth: Mucous membranes are moist       Pharynx: Oropharynx is clear  Eyes:      Conjunctiva/sclera: Conjunctivae normal    Cardiovascular:      Rate and Rhythm: Normal rate and regular rhythm  Pulses: Normal pulses  Heart sounds: No murmur heard  No gallop  Pulmonary:      Effort: Pulmonary effort is normal       Breath sounds: No wheezing or rales  Abdominal:      General: Abdomen is flat  Bowel sounds are normal  There is no distension  Tenderness: There is no abdominal tenderness  Musculoskeletal:         General: Swelling, tenderness and deformity present  Cervical back: Normal range of motion  No rigidity  Right lower leg: No edema  Left lower leg: No edema  Comments: Right hand and finger x-ray was uploaded on the media tab in Epic  Picture was reviewed and discussed with orthopedic PA and also sent to Dr Alanna Duane via [employer] messaging  The right 4th D IP joint is swollen, there is gouty tophi noted and the joint is erythematous, tender to touch and decreased range of motion of flexion  The right 3rd MCP joint is also quite very swollen with notable gouty tophi and erythema, less tender to touch  Discussed via [employer] messaging upon review of x-ray imaging with Dr Alanna Duane  Evidence of probable osteomyelitis  Lymphadenopathy:      Cervical: No cervical adenopathy  Skin:     General: Skin is warm  Capillary Refill: Capillary refill takes less than 2 seconds  Findings: Erythema and rash present  Neurological:      General: No focal deficit present  Mental Status: He is alert and oriented to person, place, and time     Psychiatric:         Mood and Affect: Mood normal          Behavior: Behavior normal        Inocencia Ho PA-C

## 2022-09-08 NOTE — ASSESSMENT & PLAN NOTE
Swollen painful right ring finger x2 months  Patient was seen in urgent care in [facility] about 2 weeks ago and had incision and drainage of the DIP joint of the right 4th finger on the dorsal aspect  Patient denies any fever or chills  Patient has known gout and had previously been on allopurinol which is not currently taking  He has swelling of the MCP joint of the right 3rd finger  Patient was seen in urgent care yesterday and was referred to General Surgery for possible incision and drainage  The patient was placed on Bactrim DS 2 weeks ago which she has been taking now twice a day, the patient also has a few Indocin SR for the pain  Discussed via telephone with Mine Ordonez PA-C, orthopedic physician assistant with Dr Park Leahy  Photo was uploaded via secure [employer] messaging for his review  He recommended that the patient have evaluation by a hand specialist   Then proceeded with telephone conversation with Dr Kiara Christianson, stat x-ray of the right hand was performed  RIGHT HAND     INDICATION:  L72 3: Sebaceous cyst      COMPARISON:  None     VIEWS:  XR HAND 3+ VW RIGHT   Images: 4     For the purposes of institution wide universal language the following terms will apply: (thumb=1st digit/finger, index finger=2nd digit/finger, long finger=3rd digit/finger, ring=4th digit/finger and small finger=5th digit/finger)     FINDINGS:     There is osteolytic/erosive changes involving the base of the 4th distal phalanx and head of the 4th middle phalanx        No other focal erosive changes are seen      There is no acute fracture or dislocation      Soft tissue swelling most notably about the 3rd PIP, 4th DIP and 2nd PIP joints  No soft tissue gas  Vascular calcifications         IMPRESSION:     Soft tissue swelling 2nd through 4th digits as above with osteolytic/erosive changes about the 4th DIP joint   Differential considerations regarding the 4th digit include gouty arthropathy, rheumatoid arthritis as well as infection (osteomyelitis)  Clinical correlation is necessary  Concern for possible osteomyelitis  Will order stat CBC, sed rate, uric acid level  Await orthopedic disposition from Dr Giselle Vicente whether not the patient should be seen here in the ED and admitted for IV antibiotics or sent to [facility] to be evaluated in the ED there

## 2022-09-09 ENCOUNTER — ANESTHESIA EVENT (INPATIENT)
Dept: PERIOP | Facility: HOSPITAL | Age: 62
DRG: 580 | End: 2022-09-09
Payer: MEDICARE

## 2022-09-09 ENCOUNTER — APPOINTMENT (OUTPATIENT)
Dept: RADIOLOGY | Facility: HOSPITAL | Age: 62
DRG: 580 | End: 2022-09-09
Payer: MEDICARE

## 2022-09-09 PROBLEM — M65.9 TENOSYNOVITIS: Status: ACTIVE | Noted: 2022-09-08

## 2022-09-09 PROBLEM — N17.9 AKI (ACUTE KIDNEY INJURY) (HCC): Status: ACTIVE | Noted: 2022-09-09

## 2022-09-09 PROBLEM — M1A.09X1 IDIOPATHIC CHRONIC GOUT OF MULTIPLE SITES WITH TOPHUS: Status: ACTIVE | Noted: 2022-09-09

## 2022-09-09 PROBLEM — E87.1 HYPONATREMIA: Status: ACTIVE | Noted: 2022-09-09

## 2022-09-09 PROBLEM — R73.9 HYPERGLYCEMIA: Status: ACTIVE | Noted: 2022-09-09

## 2022-09-09 PROBLEM — D69.6 THROMBOCYTOPENIA (HCC): Status: ACTIVE | Noted: 2022-09-09

## 2022-09-09 LAB
ABO GROUP BLD: NORMAL
ABO GROUP BLD: NORMAL
ALBUMIN SERPL BCP-MCNC: 2.9 G/DL (ref 3.5–5)
ALP SERPL-CCNC: 86 U/L (ref 46–116)
ALT SERPL W P-5'-P-CCNC: 33 U/L (ref 12–78)
ANION GAP SERPL CALCULATED.3IONS-SCNC: 5 MMOL/L (ref 4–13)
ANISOCYTOSIS BLD QL SMEAR: PRESENT
APTT PPP: 26 SECONDS (ref 23–37)
AST SERPL W P-5'-P-CCNC: 31 U/L (ref 5–45)
ATRIAL RATE: 68 BPM
BASOPHILS # BLD MANUAL: 0.12 THOUSAND/UL (ref 0–0.1)
BASOPHILS NFR MAR MANUAL: 2 % (ref 0–1)
BILIRUB SERPL-MCNC: 0.56 MG/DL (ref 0.2–1)
BLD GP AB SCN SERPL QL: NEGATIVE
BUN SERPL-MCNC: 33 MG/DL (ref 5–25)
CALCIUM ALBUM COR SERPL-MCNC: 9.4 MG/DL (ref 8.3–10.1)
CALCIUM SERPL-MCNC: 8.5 MG/DL (ref 8.3–10.1)
CHLORIDE SERPL-SCNC: 105 MMOL/L (ref 96–108)
CO2 SERPL-SCNC: 21 MMOL/L (ref 21–32)
CREAT SERPL-MCNC: 2.13 MG/DL (ref 0.6–1.3)
DACRYOCYTES BLD QL SMEAR: PRESENT
EOSINOPHIL # BLD MANUAL: 0.18 THOUSAND/UL (ref 0–0.4)
EOSINOPHIL NFR BLD MANUAL: 3 % (ref 0–6)
ERYTHROCYTE [DISTWIDTH] IN BLOOD BY AUTOMATED COUNT: 12.4 % (ref 11.6–15.1)
EST. AVERAGE GLUCOSE BLD GHB EST-MCNC: 148 MG/DL
GFR SERPL CREATININE-BSD FRML MDRD: 32 ML/MIN/1.73SQ M
GLUCOSE SERPL-MCNC: 122 MG/DL (ref 65–140)
GLUCOSE SERPL-MCNC: 147 MG/DL (ref 65–140)
GLUCOSE SERPL-MCNC: 233 MG/DL (ref 65–140)
GLUCOSE SERPL-MCNC: 268 MG/DL (ref 65–140)
HBA1C MFR BLD: 6.8 %
HCT VFR BLD AUTO: 29.4 % (ref 36.5–49.3)
HGB BLD-MCNC: 10.3 G/DL (ref 12–17)
INR PPP: 1.13 (ref 0.84–1.19)
LYMPHOCYTES # BLD AUTO: 1.08 THOUSAND/UL (ref 0.6–4.47)
LYMPHOCYTES # BLD AUTO: 18 % (ref 14–44)
MACROCYTES BLD QL AUTO: PRESENT
MAGNESIUM SERPL-MCNC: 1.9 MG/DL (ref 1.6–2.6)
MCH RBC QN AUTO: 41 PG (ref 26.8–34.3)
MCHC RBC AUTO-ENTMCNC: 35 G/DL (ref 31.4–37.4)
MCV RBC AUTO: 117 FL (ref 82–98)
MONOCYTES # BLD AUTO: 0 THOUSAND/UL (ref 0–1.22)
MONOCYTES NFR BLD: 0 % (ref 4–12)
NEUTROPHILS # BLD MANUAL: 4.63 THOUSAND/UL (ref 1.85–7.62)
NEUTS SEG NFR BLD AUTO: 77 % (ref 43–75)
P AXIS: 68 DEGREES
PLATELET BLD QL SMEAR: ABNORMAL
PMV BLD AUTO: 9.7 FL (ref 8.9–12.7)
POIKILOCYTOSIS BLD QL SMEAR: PRESENT
POLYCHROMASIA BLD QL SMEAR: PRESENT
POTASSIUM SERPL-SCNC: 4.3 MMOL/L (ref 3.5–5.3)
PR INTERVAL: 170 MS
PROT SERPL-MCNC: 6.7 G/DL (ref 6.4–8.4)
PROTHROMBIN TIME: 14.7 SECONDS (ref 11.6–14.5)
QRS AXIS: 60 DEGREES
QRSD INTERVAL: 84 MS
QT INTERVAL: 430 MS
QTC INTERVAL: 457 MS
RBC # BLD AUTO: 2.51 MILLION/UL (ref 3.88–5.62)
RBC MORPH BLD: PRESENT
RH BLD: NEGATIVE
RH BLD: NEGATIVE
SODIUM SERPL-SCNC: 131 MMOL/L (ref 135–147)
SPECIMEN EXPIRATION DATE: NORMAL
T WAVE AXIS: 77 DEGREES
VANCOMYCIN SERPL-MCNC: 14.9 UG/ML (ref 10–20)
VENTRICULAR RATE: 68 BPM
WBC # BLD AUTO: 6.01 THOUSAND/UL (ref 4.31–10.16)

## 2022-09-09 PROCEDURE — 85007 BL SMEAR W/DIFF WBC COUNT: CPT | Performed by: INTERNAL MEDICINE

## 2022-09-09 PROCEDURE — 86850 RBC ANTIBODY SCREEN: CPT

## 2022-09-09 PROCEDURE — 36415 COLL VENOUS BLD VENIPUNCTURE: CPT | Performed by: INTERNAL MEDICINE

## 2022-09-09 PROCEDURE — 80053 COMPREHEN METABOLIC PANEL: CPT | Performed by: INTERNAL MEDICINE

## 2022-09-09 PROCEDURE — 85610 PROTHROMBIN TIME: CPT | Performed by: INTERNAL MEDICINE

## 2022-09-09 PROCEDURE — 85730 THROMBOPLASTIN TIME PARTIAL: CPT | Performed by: INTERNAL MEDICINE

## 2022-09-09 PROCEDURE — 93010 ELECTROCARDIOGRAM REPORT: CPT | Performed by: INTERNAL MEDICINE

## 2022-09-09 PROCEDURE — 83036 HEMOGLOBIN GLYCOSYLATED A1C: CPT | Performed by: PHYSICIAN ASSISTANT

## 2022-09-09 PROCEDURE — 93005 ELECTROCARDIOGRAM TRACING: CPT

## 2022-09-09 PROCEDURE — 86901 BLOOD TYPING SEROLOGIC RH(D): CPT

## 2022-09-09 PROCEDURE — 71045 X-RAY EXAM CHEST 1 VIEW: CPT

## 2022-09-09 PROCEDURE — 86900 BLOOD TYPING SEROLOGIC ABO: CPT

## 2022-09-09 PROCEDURE — 99223 1ST HOSP IP/OBS HIGH 75: CPT | Performed by: INTERNAL MEDICINE

## 2022-09-09 PROCEDURE — 80202 ASSAY OF VANCOMYCIN: CPT | Performed by: INTERNAL MEDICINE

## 2022-09-09 PROCEDURE — 96365 THER/PROPH/DIAG IV INF INIT: CPT

## 2022-09-09 PROCEDURE — NC001 PR NO CHARGE: Performed by: SURGERY

## 2022-09-09 PROCEDURE — 82948 REAGENT STRIP/BLOOD GLUCOSE: CPT

## 2022-09-09 PROCEDURE — 85027 COMPLETE CBC AUTOMATED: CPT | Performed by: INTERNAL MEDICINE

## 2022-09-09 PROCEDURE — 83735 ASSAY OF MAGNESIUM: CPT | Performed by: INTERNAL MEDICINE

## 2022-09-09 PROCEDURE — A9585 GADOBUTROL INJECTION: HCPCS | Performed by: EMERGENCY MEDICINE

## 2022-09-09 RX ORDER — HEPARIN SODIUM 5000 [USP'U]/ML
5000 INJECTION, SOLUTION INTRAVENOUS; SUBCUTANEOUS EVERY 8 HOURS SCHEDULED
Status: DISCONTINUED | OUTPATIENT
Start: 2022-09-10 | End: 2022-09-11

## 2022-09-09 RX ORDER — SODIUM CHLORIDE 9 MG/ML
75 INJECTION, SOLUTION INTRAVENOUS CONTINUOUS
Status: DISCONTINUED | OUTPATIENT
Start: 2022-09-09 | End: 2022-09-13

## 2022-09-09 RX ORDER — PREDNISONE 20 MG/1
40 TABLET ORAL DAILY
Status: COMPLETED | OUTPATIENT
Start: 2022-09-09 | End: 2022-09-13

## 2022-09-09 RX ORDER — SODIUM CHLORIDE, SODIUM LACTATE, POTASSIUM CHLORIDE, CALCIUM CHLORIDE 600; 310; 30; 20 MG/100ML; MG/100ML; MG/100ML; MG/100ML
75 INJECTION, SOLUTION INTRAVENOUS CONTINUOUS
Status: DISCONTINUED | OUTPATIENT
Start: 2022-09-10 | End: 2022-09-10

## 2022-09-09 RX ORDER — HEPARIN SODIUM 5000 [USP'U]/ML
5000 INJECTION, SOLUTION INTRAVENOUS; SUBCUTANEOUS EVERY 8 HOURS SCHEDULED
Status: DISCONTINUED | OUTPATIENT
Start: 2022-09-09 | End: 2022-09-09

## 2022-09-09 RX ORDER — COLCHICINE 0.6 MG/1
0.6 TABLET ORAL DAILY
Status: DISCONTINUED | OUTPATIENT
Start: 2022-09-09 | End: 2022-09-09

## 2022-09-09 RX ORDER — VANCOMYCIN HYDROCHLORIDE 1 G/200ML
1000 INJECTION, SOLUTION INTRAVENOUS ONCE
Status: COMPLETED | OUTPATIENT
Start: 2022-09-09 | End: 2022-09-10

## 2022-09-09 RX ORDER — HEPARIN SODIUM 5000 [USP'U]/ML
5000 INJECTION, SOLUTION INTRAVENOUS; SUBCUTANEOUS EVERY 8 HOURS SCHEDULED
Status: DISCONTINUED | OUTPATIENT
Start: 2022-09-10 | End: 2022-09-09

## 2022-09-09 RX ORDER — MAGNESIUM HYDROXIDE/ALUMINUM HYDROXICE/SIMETHICONE 120; 1200; 1200 MG/30ML; MG/30ML; MG/30ML
30 SUSPENSION ORAL EVERY 6 HOURS PRN
Status: DISCONTINUED | OUTPATIENT
Start: 2022-09-09 | End: 2022-09-16 | Stop reason: HOSPADM

## 2022-09-09 RX ORDER — ONDANSETRON 2 MG/ML
4 INJECTION INTRAMUSCULAR; INTRAVENOUS EVERY 6 HOURS PRN
Status: DISCONTINUED | OUTPATIENT
Start: 2022-09-09 | End: 2022-09-16 | Stop reason: HOSPADM

## 2022-09-09 RX ORDER — DOCUSATE SODIUM 100 MG/1
100 CAPSULE, LIQUID FILLED ORAL 2 TIMES DAILY PRN
Status: DISCONTINUED | OUTPATIENT
Start: 2022-09-09 | End: 2022-09-16 | Stop reason: HOSPADM

## 2022-09-09 RX ORDER — ACETAMINOPHEN 325 MG/1
650 TABLET ORAL EVERY 6 HOURS PRN
Status: DISCONTINUED | OUTPATIENT
Start: 2022-09-09 | End: 2022-09-16 | Stop reason: HOSPADM

## 2022-09-09 RX ORDER — ENOXAPARIN SODIUM 100 MG/ML
40 INJECTION SUBCUTANEOUS DAILY
Status: DISCONTINUED | OUTPATIENT
Start: 2022-09-09 | End: 2022-09-09

## 2022-09-09 RX ADMIN — HEPARIN SODIUM 5000 UNITS: 5000 INJECTION INTRAVENOUS; SUBCUTANEOUS at 09:46

## 2022-09-09 RX ADMIN — SODIUM CHLORIDE, SODIUM LACTATE, POTASSIUM CHLORIDE, AND CALCIUM CHLORIDE 75 ML/HR: .6; .31; .03; .02 INJECTION, SOLUTION INTRAVENOUS at 23:42

## 2022-09-09 RX ADMIN — HEPARIN SODIUM 5000 UNITS: 5000 INJECTION INTRAVENOUS; SUBCUTANEOUS at 17:14

## 2022-09-09 RX ADMIN — SODIUM CHLORIDE 75 ML/HR: 0.9 INJECTION, SOLUTION INTRAVENOUS at 11:19

## 2022-09-09 RX ADMIN — ACETAMINOPHEN 650 MG: 325 TABLET ORAL at 18:14

## 2022-09-09 RX ADMIN — VANCOMYCIN HYDROCHLORIDE 1500 MG: 10 INJECTION, POWDER, LYOPHILIZED, FOR SOLUTION INTRAVENOUS at 00:41

## 2022-09-09 RX ADMIN — PREDNISONE 40 MG: 20 TABLET ORAL at 09:46

## 2022-09-09 RX ADMIN — GADOBUTROL 7 ML: 604.72 INJECTION INTRAVENOUS at 00:00

## 2022-09-09 NOTE — PROGRESS NOTES
Progress Note - Orthopedics   Vic Paula 62 y o  male MRN: [id number]  Unit/Bed#: [facility] 623-01      Subjective:    62 y o male with destructive changes of the long finger middle phalanx and base of distal phalanx with joint effusion, concerning for inflammatory arthropathy in the setting of gout as well as inflammatory tenosynovitis of the long finger flexor tendon at the base of the middle phalanx  No acute events, no complaints  Pt doing well  Pain controlled  Denies fevers chills, CP, SOB    Labs:  0   Lab Value Date/Time    HCT 29 4 (L) 09/09/2022 0431    HCT 36 1 (L) 09/08/2022 2032    HGB 10 3 (L) 09/09/2022 0431    HGB 12 5 09/08/2022 2032    INR 1 13 09/09/2022 0431    WBC 6 01 09/09/2022 0431    WBC 7 08 09/08/2022 2032    ESR 57 (H) 09/08/2022 2032    CRP 39 1 (H) 09/08/2022 2032       Meds:    Current Facility-Administered Medications:     aluminum-magnesium hydroxide-simethicone (MYLANTA) oral suspension 30 mL, 30 mL, Oral, Q6H PRN, Mariana Nelson MD    colchicine (COLCRYS) tablet 0 6 mg, 0 6 mg, Oral, Daily, Boni Vidal MD    docusate sodium (COLACE) capsule 100 mg, 100 mg, Oral, BID PRN, Mariana Nelson MD    enoxaparin (LOVENOX) subcutaneous injection 40 mg, 40 mg, Subcutaneous, Daily, Mariana Nelson MD    ondansetron [facility]) injection 4 mg, 4 mg, Intravenous, Q6H PRN, Mariana Nelson MD    vancomycin (VANCOCIN) IVPB (premix in dextrose) 1,000 mg 200 mL, 15 mg/kg, Intravenous, Daily PRN, Mariana Nelson MD    Blood Culture:   No results found for: BLOODCX    Wound Culture:   Lab Results   Component Value Date    WOUNDCULT 3+ Growth of Staphylococcus aureus (A) 08/29/2022       Ins and Outs:  No intake/output data recorded            Physical:  Vitals:    09/09/22 0748   BP: 150/82   Pulse: 76   Resp: 16   Temp: 99 °F (37 2 °C)   SpO2: 100%     Musculoskeletal: right Upper Extremity  · Skin intact, erythema, swelling, tophi appreciated at dorsum of long finger PIP joints and ring finger DIP joint  · Ulcer present at radial aspect of dorsum of the distal interphalangeal joint of the ring finger with no active or expressible purulence or drainage   · No draining sinus tract   · Minimal tenderness to palpation of the long finger PIP joint or ring finger DIPJ  · Full active and passive range of motion of all MCPs, PIP, and the IPJ of the thumb, index finger, small finger   · Able to flex long finger PIPJ to 90° actively and passively, very limited active flexion of the IPJ   · Full flexion extension of all MCP joint of the right hand   · Sensory intact to light touch throughout right upper extremity   · Motor intact AIN/PIN/M/R/U   · Palpable radial pulse    Assessment:    61 y o male with destructive changes of the long finger middle phalanx and base of distal phalanx with joint effusion, concerning for inflammatory arthropathy in the setting of gout as well as inflammatory tenosynovitis of the long finger flexor tendon at the base of the middle phalanx  Patient will most likely require operative intervention for this condition      Plan:  · ENDY RUE  · Plan for I&D of right hand, timing pending discussion   · PT/OT  · Pain control  · DVT ppx  · Dispo: Ortho will follow    Jose Luis Warren MD

## 2022-09-09 NOTE — ED PROVIDER NOTES
History  Chief Complaint   Patient presents with    Finger Swelling     Told to come to ED for admission to see hand surgeon and for IV abx     HPI    80-year-old male presenting by General surgery for evaluation of right 3rd and 4th digit swelling  Patient states that he has had swelling for several months, has had multiple I and D's, was seen by general surgeon today who reached out to hand surgery who recommended hand surgery evaluation in the ED  Patient has been on Bactrim without relief of his symptoms  Patient had a outpatient x-ray today which showed concern for osteomyelitis  Denies fever, chills, systemic symptoms  Prior to Admission Medications   Prescriptions Last Dose Informant Patient Reported? Taking?   colchicine (COLCRYS) 0 6 mg tablet   Yes No   Sig: Take 0 6 mg by mouth daily   indomethacin (INDOCIN SR) 75 mg CR capsule   No No   Sig: Take 1 capsule (75 mg total) by mouth 2 (two) times a day with meals Take with food  sulfamethoxazole-trimethoprim (BACTRIM DS) 800-160 mg per tablet   No No   Sig: Take 1 tablet by mouth every 12 (twelve) hours for 7 days      Facility-Administered Medications: None       Past Medical History:   Diagnosis Date    Known health problems: none        Past Surgical History:   Procedure Laterality Date    ABDOMINAL SURGERY      COLON SURGERY         History reviewed  No pertinent family history  I have reviewed and agree with the history as documented  E-Cigarette/Vaping    E-Cigarette Use Never User      E-Cigarette/Vaping Substances     Social History     Tobacco Use    Smoking status: Never Smoker    Smokeless tobacco: Never Used   Vaping Use    Vaping Use: Never used   Substance Use Topics    Alcohol use: Yes     Alcohol/week: 2 0 standard drinks     Types: 2 Glasses of wine per week     Comment: social    Drug use: Never        Review of Systems   Constitutional: Negative for chills and fever  HENT: Negative for sore throat      Respiratory: Negative for cough and shortness of breath  Cardiovascular: Negative for chest pain, palpitations and leg swelling  Gastrointestinal: Negative for abdominal pain, diarrhea, nausea and vomiting  Genitourinary: Negative for dysuria and frequency  Musculoskeletal: Negative for myalgias  Skin: Positive for rash  Negative for wound  Neurological: Negative for dizziness, light-headedness and headaches  All other systems reviewed and are negative  Physical Exam  ED Triage Vitals   Temperature Pulse Respirations Blood Pressure SpO2   09/08/22 1900 09/08/22 1900 09/08/22 1900 09/08/22 1900 09/08/22 1900   99 5 °F (37 5 °C) (!) 106 20 128/91 98 %      Temp Source Heart Rate Source Patient Position - Orthostatic VS BP Location FiO2 (%)   09/08/22 1900 09/09/22 0045 09/09/22 0045 09/09/22 0045 --   Oral Monitor Lying Left arm       Pain Score       09/08/22 1900       5             Orthostatic Vital Signs  Vitals:    09/08/22 1900 09/08/22 2252 09/09/22 0045 09/09/22 0748   BP: 128/91 (!) 172/84 156/80 150/82   Pulse: (!) 106 83 70 76   Patient Position - Orthostatic VS:   Lying        Physical Exam  Vitals and nursing note reviewed  Constitutional:       General: He is not in acute distress  Appearance: Normal appearance  He is not ill-appearing or toxic-appearing  HENT:      Head: Normocephalic and atraumatic  Right Ear: External ear normal       Left Ear: External ear normal       Nose: Nose normal       Mouth/Throat:      Mouth: Mucous membranes are moist       Pharynx: Oropharynx is clear  Eyes:      General: No scleral icterus  Extraocular Movements: Extraocular movements intact  Cardiovascular:      Rate and Rhythm: Normal rate and regular rhythm  Pulses: Normal pulses  Pulmonary:      Effort: Pulmonary effort is normal  No respiratory distress  Breath sounds: Normal breath sounds  Abdominal:      Palpations: Abdomen is soft  Tenderness:  There is no abdominal tenderness  Musculoskeletal:         General: Swelling (Swelling and redness to the 3rd and 4th digits of the right hand  ) present  Normal range of motion  Cervical back: Normal range of motion and neck supple  Skin:     General: Skin is warm  Capillary Refill: Capillary refill takes less than 2 seconds  Neurological:      General: No focal deficit present  Mental Status: He is alert and oriented to person, place, and time                ED Medications  Medications   vancomycin (VANCOCIN) IVPB (premix in dextrose) 1,000 mg 200 mL (has no administration in time range)   docusate sodium (COLACE) capsule 100 mg (has no administration in time range)   ondansetron (ZOFRAN) injection 4 mg (has no administration in time range)   aluminum-magnesium hydroxide-simethicone (MYLANTA) oral suspension 30 mL (has no administration in time range)   heparin (porcine) subcutaneous injection 5,000 Units (5,000 Units Subcutaneous Given 9/9/22 0946)   predniSONE tablet 40 mg (40 mg Oral Given 9/9/22 0946)   sodium chloride 0 9 % infusion (75 mL/hr Intravenous New Bag 9/9/22 1119)   lactated ringers infusion (has no administration in time range)   acetaminophen (TYLENOL) tablet 650 mg (650 mg Oral Given 9/8/22 2033)   ketorolac (TORADOL) injection 15 mg (15 mg Intravenous Given 9/8/22 2033)   sodium chloride 0 9 % bolus 1,000 mL (0 mL Intravenous Stopped 9/9/22 0046)   vancomycin (VANCOCIN) 1500 mg in sodium chloride 0 9% 250 mL IVPB (0 mg/kg × 71 7 kg Intravenous Stopped 9/9/22 0223)   Gadobutrol injection (SINGLE-DOSE) SOLN 7 mL (7 mL Intravenous Given 9/9/22 0000)       Diagnostic Studies  Results Reviewed     Procedure Component Value Units Date/Time    Hemoglobin A1C [[id number]]  (Abnormal) Collected: 09/09/22 0431    Lab Status: Final result Specimen: Blood from Arm, Right Updated: 09/09/22 1027     Hemoglobin A1C 6 8 %       mg/dl     CBC and differential [[id number]]  (Abnormal) Collected: 09/09/22 0431 Lab Status: Final result Specimen: Blood from Arm, Right Updated: 09/09/22 0532     WBC 6 01 Thousand/uL      RBC 2 51 Million/uL      Hemoglobin 10 3 g/dL      Hematocrit 29 4 %       fL      MCH 41 0 pg      MCHC 35 0 g/dL      RDW 12 4 %      MPV 9 7 fL      Platelets --    Narrative: This is an appended report  These results have been appended to a previously verified report      Manual Differential(PHLEBS Do Not Order) [[id number]]  (Abnormal) Collected: 09/09/22 0431    Lab Status: Final result Specimen: Blood from Arm, Right Updated: 09/09/22 0532     Segmented % 77 %      Lymphocytes % 18 %      Monocytes % 0 %      Eosinophils, % 3 %      Basophils % 2 %      Absolute Neutrophils 4 63 Thousand/uL      Lymphocytes Absolute 1 08 Thousand/uL      Monocytes Absolute 0 00 Thousand/uL      Eosinophils Absolute 0 18 Thousand/uL      Basophils Absolute 0 12 Thousand/uL      Total Counted --     RBC Morphology Present     Anisocytosis Present     Macrocytes Present     Poikilocytes Present     Polychromasia Present     Tear Drop Cells Present     Platelet Estimate Unable to Estimate due to clumped platelets    Protime-INR [[id number]]  (Abnormal) Collected: 09/09/22 0431    Lab Status: Final result Specimen: Blood from Arm, Right Updated: 09/09/22 0517     Protime 14 7 seconds      INR 1 13    APTT [[id number]]  (Normal) Collected: 09/09/22 0431    Lab Status: Final result Specimen: Blood from Arm, Right Updated: 09/09/22 0517     PTT 26 seconds     Comprehensive metabolic panel [[id number]]  (Abnormal) Collected: 09/09/22 0431    Lab Status: Final result Specimen: Blood from Arm, Right Updated: 09/09/22 0511     Sodium 131 mmol/L      Potassium 4 3 mmol/L      Chloride 105 mmol/L      CO2 21 mmol/L      ANION GAP 5 mmol/L      BUN 33 mg/dL      Creatinine 2 13 mg/dL      Glucose 122 mg/dL      Calcium 8 5 mg/dL      Corrected Calcium 9 4 mg/dL      AST 31 U/L      ALT 33 U/L      Alkaline Phosphatase 86 U/L      Total Protein 6 7 g/dL      Albumin 2 9 g/dL      Total Bilirubin 0 56 mg/dL      eGFR 32 ml/min/1 73sq m     Narrative:      [place] guidelines for Chronic Kidney Disease (CKD):     Stage 1 with normal or high GFR (GFR > 90 mL/min/1 73 square meters)    Stage 2 Mild CKD (GFR = 60-89 mL/min/1 73 square meters)    Stage 3A Moderate CKD (GFR = 45-59 mL/min/1 73 square meters)    Stage 3B Moderate CKD (GFR = 30-44 mL/min/1 73 square meters)    Stage 4 Severe CKD (GFR = 15-29 mL/min/1 73 square meters)    Stage 5 End Stage CKD (GFR <15 mL/min/1 73 square meters)  Note: GFR calculation is accurate only with a steady state creatinine    Magnesium [[id number]]  (Normal) Collected: 09/09/22 0431    Lab Status: Final result Specimen: Blood from Arm, Right Updated: 09/09/22 0511     Magnesium 1 9 mg/dL     Sedimentation rate, automated [[id number]]  (Abnormal) Collected: 09/08/22 2032    Lab Status: Final result Specimen: Blood from Arm, Left Updated: 09/08/22 2110     Sed Rate 57 mm/hour     Basic metabolic panel [[id number]]  (Abnormal) Collected: 09/08/22 2032    Lab Status: Final result Specimen: Blood from Arm, Left Updated: 09/08/22 2102     Sodium 129 mmol/L      Potassium 4 5 mmol/L      Chloride 99 mmol/L      CO2 21 mmol/L      ANION GAP 9 mmol/L      BUN 31 mg/dL      Creatinine 2 18 mg/dL      Glucose 154 mg/dL      Calcium 9 5 mg/dL      eGFR 31 ml/min/1 73sq m     Narrative:      [place] guidelines for Chronic Kidney Disease (CKD):     Stage 1 with normal or high GFR (GFR > 90 mL/min/1 73 square meters)    Stage 2 Mild CKD (GFR = 60-89 mL/min/1 73 square meters)    Stage 3A Moderate CKD (GFR = 45-59 mL/min/1 73 square meters)    Stage 3B Moderate CKD (GFR = 30-44 mL/min/1 73 square meters)    Stage 4 Severe CKD (GFR = 15-29 mL/min/1 73 square meters)    Stage 5 End Stage CKD (GFR <15 mL/min/1 73 square meters)  Note: GFR calculation is accurate only with a steady state creatinine    C-reactive protein [[id number]]  (Abnormal) Collected: 09/08/22 2032    Lab Status: Final result Specimen: Blood from Arm, Left Updated: 09/08/22 2102     CRP 39 1 mg/L     Uric acid [[id number]]  (Normal) Collected: 09/08/22 2032    Lab Status: Final result Specimen: Blood from Arm, Left Updated: 09/08/22 2102     Uric Acid 7 8 mg/dL     CBC and differential [[id number]]  (Abnormal) Collected: 09/08/22 2032    Lab Status: Final result Specimen: Blood from Arm, Left Updated: 09/08/22 2053     WBC 7 08 Thousand/uL      RBC 3 05 Million/uL      Hemoglobin 12 5 g/dL      Hematocrit 36 1 %       fL      MCH 41 0 pg      MCHC 34 6 g/dL      RDW 12 9 %      MPV 9 9 fL      Platelets 707 Thousands/uL      nRBC 0 /100 WBCs      Neutrophils Relative 86 %      Immat GRANS % 0 %      Lymphocytes Relative 9 %      Monocytes Relative 3 %      Eosinophils Relative 1 %      Basophils Relative 1 %      Neutrophils Absolute 6 13 Thousands/µL      Immature Grans Absolute 0 02 Thousand/uL      Lymphocytes Absolute 0 62 Thousands/µL      Monocytes Absolute 0 23 Thousand/µL      Eosinophils Absolute 0 04 Thousand/µL      Basophils Absolute 0 04 Thousands/µL                  XR chest portable   Final Result by Dimple Rodriguez MD (09/09 1346)      No acute cardiopulmonary disease  Workstation performed: [id number]         MRI hand right w wo contrast   Final Result by Malika Reyna MD (09/09 0750)   1  Destructive change of the distal aspect 4th digit middle phalanx and base of the 4th digit distal phalanx with adjacent joint effusion  Similar appearing joint effusions at the 2nd and 3rd digit PIP joints however there is is not significant osseous    destructive change at these joints    Differential considerations include inflammatory arthropathy although septic arthritis at the 4th digit DIP joint cannot be excluded on this exam   Clinical and laboratory correlation is recommended and joint    aspiration should be considered  2   Trace tenosynovitis of the 4th digit flexor tendon at the level of the base of the 4th digit middle phalanx likely inflammatory  Fluid does not extend proximally along the tendon  Infectious tenosynovitis is less likely given the appearance  The study was marked in EPIC for significant notification  Workstation performed: [id number]               Procedures  Procedures      ED Course                             SBIRT 22yo+    Flowsheet Row Most Recent Value   SBIRT (25 yo +)    In order to provide better care to our patients, we are screening all of our patients for alcohol and drug use  Would it be okay to ask you these screening questions? Unable to answer at this time Filed at: 09/08/2022 [facility]                [facility]  Number of Diagnoses or Management Options  Abscess of right ring finger  Diagnosis management comments: 45-year-old male presenting for evaluation of concern for osteomyelitis  Will consult Hand and dispo per hand recommendations  Suspect admission for IV antibiotics  Hand resident evaluated the patient, requests stat MRI of the hand  Patient started on IV vanco, to be admitted to medicine with ortho consult  Disposition  Final diagnoses:   Abscess of right ring finger     Time reflects when diagnosis was documented in both MDM as applicable and the Disposition within this note     Time User Action Codes Description Comment    9/8/2022  8:42 PM Bhargav Helm Add [L02 511] Abscess of right ring finger     9/8/2022 10:12 PM Sol Horne Add [M19 90] Inflammatory arthropathy       ED Disposition     ED Disposition   Admit    Condition   Stable    Date/Time   Fri Sep 9, 2022 12:59 AM    Comment   Case was discussed with Dr Rosa Vargas and the patient's admission status was agreed to be Admission Status: inpatient status to the service of Dr Rosa Vargas             Follow-up Information    None Current Discharge Medication List      CONTINUE these medications which have NOT CHANGED    Details   colchicine (COLCRYS) 0 6 mg tablet Take 0 6 mg by mouth daily      indomethacin (INDOCIN SR) 75 mg CR capsule Take 1 capsule (75 mg total) by mouth 2 (two) times a day with meals Take with food  Qty: 20 capsule, Refills: 0    Associated Diagnoses: Gout      sulfamethoxazole-trimethoprim (BACTRIM DS) 800-160 mg per tablet Take 1 tablet by mouth every 12 (twelve) hours for 7 days  Qty: 14 tablet, Refills: 0    Associated Diagnoses: Abscess of right ring finger           No discharge procedures on file  PDMP Review     None           ED Provider  Attending physically available and evaluated Madeline Wolfe  PAUL managed the patient along with the ED Attending      Electronically Signed by         Pascale Harper DO  09/09/22 5031

## 2022-09-09 NOTE — PROGRESS NOTES
[address] [facility]  Progress Note - Davin Christian 1960, 62 y o  male MRN: [id number]  Unit/Bed#: [facility] [place] 623-01 Encounter: [id number]  Primary Care Provider: Katie Tobias MD   Date and time admitted to hospital: 9/8/2022  7:47 PM      DOS: 9/9/2022  * Tenosynovitis of right hand  Assessment & Plan  · Pt presented with right hand tophi gout with swelling and pain   · Ortho following,  · MRI obtained - Destructive change of the distal aspect of the 4th digit middle phalanx and base of the 4th digit distal phalanx with adjacent joint effusion  Trace tenosynovitis of the 4th digit flexor tendon at level of base of 4th digit middle phalanx likely inflammatory  Infectious tenosynovitis is less likely given appearance   · S/p Bactrim as an outpatient without improvement  · Currently on IV Vancomycin, can likely d/c per ortho as changes appear to be more inflammatory rather than infections, would continue to follow  · NWB RUE, plan for I&D right hand today, NPO  · Start on PO Prednisone for gout treatment, d/c colchicine in setting of LEX  · SED rate and CRP elevated    Thrombocytopenia (HCC)  Assessment & Plan  · Could be reactive, last platelets in the 953Y   · Trend CBC  · No evidence of active bleeding     Hyperglycemia  Assessment & Plan  · Noted on BMP  · Obtain hgbA1c  · Fingersticks while on steroids   · Monitor     LEX (acute kidney injury) ([facility] [address] )  Assessment & Plan  · POA, Cr  2 18  · Per review of outpatient records in care everywhere, most recent cr   Noted to be 0 7  · Likely in setting of volume depletion and dehydration from poor PO intake over the last week   · Place on IVF hydration   · Trend BMP closely   · Avoid NSAIDs, colchicine discontinued   · Avoid hypotension    Hyponatremia  Assessment & Plan  · POA, sodium 129  · Likely in setting of poor PO intake as an outpatient, reports he had poor appetite while on Bactrim outpatient and did not eat much   · Improvement noted, start on continuous IVF hydration   · Trend BMP in the AM  · Sodium 131 this AM    Idiopathic chronic gout of multiple sites with tophus  Assessment & Plan  · Noted on the right hand   · Typically maintained on indomethacin and colchicine, now discontinued in setting of renal function   · Place on PO prednisone 40 mg daily x 5 days for inflammatory treatment   · Additional plan as above       VTE Pharmacologic Prophylaxis:   Moderate Risk (Score 3-4) - Pharmacological DVT Prophylaxis Ordered: heparin  Patient Centered Rounds: I evaluated the patient without nursing staff present due to speaking to nurse outside patient's room   Discussions with Specialists or Other Care Team Provider: Discussed with RN, CM, ortho and reviewed previous notes     Education and Discussions with Family / Patient: Patient declined call to   Time Spent for Care: 20 minutes  More than 50% of total time spent on counseling and coordination of care as described above  Current Length of Stay: 0 day(s)  Current Patient Status: Inpatient   Certification Statement: The patient will continue to require additional inpatient hospital stay due to LEX, IVF, I&D per ortho  Discharge Plan: Anticipate discharge in 48-72 hrs to discharge location to be determined pending rehab evaluations  Code Status: Level 1 - Full Code    Subjective:   Pt reports that he feels well today  Pain in the right hand is about the same compared to yesterday  Denies any fevers, chills, abdominal pain  Reports while he was on the Bactrim outpatient he had poor appetite and lost about 10 lbs due to not wanting to eat  Objective:     Vitals:   Temp (24hrs), Av 1 °F (37 3 °C), Min:98 9 °F (37 2 °C), Max:99 5 °F (37 5 °C)    Temp:  [98 9 °F (37 2 °C)-99 5 °F (37 5 °C)] 99 °F (37 2 °C)  HR:  [] 76  Resp:  [16-20] 16  BP: (128-172)/(80-98) 150/82  SpO2:  [98 %-100 %] 100 %  There is no height or weight on file to calculate BMI       Input and Output Summary (last 24 hours):   No intake or output data in the 24 hours ending 09/09/22 0948    Physical Exam:   Physical Exam  Vitals reviewed  Constitutional:       General: He is not in acute distress  Appearance: He is not toxic-appearing  Comments: Pt is in no acute distress lying in his hospital bed resting comfortably  Right hand with gouty tophi, decreased ROM   HENT:      Head: Normocephalic and atraumatic  Cardiovascular:      Rate and Rhythm: Normal rate and regular rhythm  Pulses: Normal pulses  Pulmonary:      Effort: Pulmonary effort is normal  No respiratory distress  Breath sounds: No wheezing  Abdominal:      General: Bowel sounds are normal  There is no distension  Palpations: Abdomen is soft  Tenderness: There is no abdominal tenderness  Musculoskeletal:      Right lower leg: No edema  Left lower leg: No edema  Skin:     General: Skin is warm and dry  Neurological:      Mental Status: He is alert     Psychiatric:         Mood and Affect: Mood normal           Additional Data:     Labs:  Results from last 7 days   Lab Units 09/09/22  0431 09/08/22  2032   WBC Thousand/uL 6 01 7 08   HEMOGLOBIN g/dL 10 3* 12 5   HEMATOCRIT % 29 4* 36 1*   PLATELETS Thousands/uL  --  109*   NEUTROS PCT %  --  86*   LYMPHS PCT %  --  9*   LYMPHO PCT % 18  --    MONOS PCT %  --  3*   MONO PCT % 0*  --    EOS PCT % 3 1     Results from last 7 days   Lab Units 09/09/22  0431   SODIUM mmol/L 131*   POTASSIUM mmol/L 4 3   CHLORIDE mmol/L 105   CO2 mmol/L 21   BUN mg/dL 33*   CREATININE mg/dL 2 13*   ANION GAP mmol/L 5   CALCIUM mg/dL 8 5   ALBUMIN g/dL 2 9*   TOTAL BILIRUBIN mg/dL 0 56   ALK PHOS U/L 86   ALT U/L 33   AST U/L 31   GLUCOSE RANDOM mg/dL 122     Results from last 7 days   Lab Units 09/09/22  0431   INR  1 13                   Lines/Drains:  Invasive Devices  Report    Peripheral Intravenous Line  Duration           Peripheral IV 09/09/22 Right Antecubital <1 day                      Imaging: Reviewed radiology reports from this admission including: MRI hand    Recent Cultures (last 7 days):         Last 24 Hours Medication List:   Current Facility-Administered Medications   Medication Dose Route Frequency Provider Last Rate    aluminum-magnesium hydroxide-simethicone  30 mL Oral Q6H PRN Chris Swift MD      docusate sodium  100 mg Oral BID PRN Chris Swift MD      heparin (porcine)  5,000 Units Subcutaneous [facility] Lisa Noble PA-C      ondansetron  4 mg Intravenous Q6H PRN Chris Swift MD      predniSONE  40 mg Oral Daily Lisa Noble PA-C      sodium chloride  75 mL/hr Intravenous Continuous Lisa Noble PA-C      vancomycin  15 mg/kg Intravenous Daily PRN Chris Swift MD          Today, Patient Was Seen By: Leesa Zelaya PA-C    **Please Note: This note may have been constructed using a voice recognition system  **

## 2022-09-09 NOTE — ASSESSMENT & PLAN NOTE
· POA, Cr  2 18  · Per review of outpatient records in care everywhere, most recent cr   Noted to be 0 7  · Likely in setting of volume depletion and dehydration from poor PO intake over the last week   · Place on IVF hydration   · Trend BMP closely   · Avoid NSAIDs, colchicine discontinued   · Avoid hypotension

## 2022-09-09 NOTE — ASSESSMENT & PLAN NOTE
· Pt presented with right hand tophi gout with swelling and pain   · Ortho following,  · MRI obtained - Destructive change of the distal aspect of the 4th digit middle phalanx and base of the 4th digit distal phalanx with adjacent joint effusion  Trace tenosynovitis of the 4th digit flexor tendon at level of base of 4th digit middle phalanx likely inflammatory   Infectious tenosynovitis is less likely given appearance   · S/p Bactrim as an outpatient without improvement  · Currently on IV Vancomycin, can likely d/c per ortho as changes appear to be more inflammatory rather than infections, would continue to follow  · NWB RUE, plan for I&D right hand today, NPO  · Start on PO Prednisone for gout treatment, d/c colchicine in setting of LEX  · SED rate and CRP elevated

## 2022-09-09 NOTE — ASSESSMENT & PLAN NOTE
· POA, sodium 129  · Likely in setting of poor PO intake as an outpatient, reports he had poor appetite while on Bactrim outpatient and did not eat much   · Improvement noted, start on continuous IVF hydration   · Trend BMP in the AM  · Sodium 131 this AM

## 2022-09-09 NOTE — PROGRESS NOTES
Vancomycin Assessment    Sruthi Ramsey is a 62 y o  male who is currently receiving vancomycin 1500 mg IV once for osteomyelitis     Relevant clinical data and objective history reviewed:  Creatinine   Date Value Ref Range Status   09/08/2022 2 18 (H) 0 60 - 1 30 mg/dL Final     Comment:     Standardized to IDMS reference method     /91   Pulse (!) 106   Temp 99 5 °F (37 5 °C) (Oral)   Resp 20   SpO2 98%   No intake/output data recorded  Lab Results   Component Value Date/Time    BUN 31 (H) 09/08/2022 08:32 PM    WBC 7 08 09/08/2022 08:32 PM    HGB 12 5 09/08/2022 08:32 PM    HCT 36 1 (L) 09/08/2022 08:32 PM     (H) 09/08/2022 08:32 PM     (L) 09/08/2022 08:32 PM     Temp Readings from Last 3 Encounters:   09/08/22 99 5 °F (37 5 °C) (Oral)   09/08/22 98 9 °F (37 2 °C)   08/29/22 98 7 °F (37 1 °C)     Vancomycin Days of Therapy: 1    Assessment/Plan  The patient is currently on vancomycin utilizing pulse dosing based on actual body weight  Baseline risks associated with therapy include: pre-existing renal impairment and concomitant nephrotoxic medications  The patient is currently receiving 1500 mg IV once and after clinical evaluation will be followed by 1 g IV daily PRN  Pharmacy will also follow closely for s/sx of nephrotoxicity, infusion reactions, and appropriateness of therapy  BMP and CBC will be ordered per protocol  Plan for random level tomorrow 9-9-22  Due to infection severity, will target a trough of 15-20 (appropriate for most indications)   Pharmacy will continue to follow the patients culture results and clinical progress daily      Dillan Alvarado, Pharmacist

## 2022-09-09 NOTE — PLAN OF CARE
Problem: PAIN - ADULT  Goal: Verbalizes/displays adequate comfort level or baseline comfort level  Description: Interventions:  - Encourage patient to monitor pain and request assistance  - Assess pain using appropriate pain scale  - Administer analgesics based on type and severity of pain and evaluate response  - Implement non-pharmacological measures as appropriate and evaluate response  - Consider cultural and social influences on pain and pain management  - Notify physician/advanced practitioner if interventions unsuccessful or patient reports new pain  Outcome: Progressing     Problem: INFECTION - ADULT  Goal: Absence or prevention of progression during hospitalization  Description: INTERVENTIONS:  - Assess and monitor for signs and symptoms of infection  - Monitor lab/diagnostic results  - Monitor all insertion sites, i e  indwelling lines, tubes, and drains  - Monitor endotracheal if appropriate and nasal secretions for changes in amount and color  - [place] appropriate cooling/warming therapies per order  - Administer medications as ordered  - Instruct and encourage patient and family to use good hand hygiene technique  - Identify and instruct in appropriate isolation precautions for identified infection/condition  Outcome: Progressing  Goal: Absence of fever/infection during neutropenic period  Description: INTERVENTIONS:  - Monitor WBC    Outcome: Progressing     Problem: SAFETY ADULT  Goal: Patient will remain free of falls  Description: INTERVENTIONS:  - Educate patient/family on patient safety including physical limitations  - Instruct patient to call for assistance with activity   - Consult OT/PT to assist with strengthening/mobility   - Keep Call bell within reach  - Keep bed low and locked with side rails adjusted as appropriate  - Keep care items and personal belongings within reach  - Initiate and maintain comfort rounds  - Make Fall Risk Sign visible to staff  - Offer Toileting  - Obtain necessary fall risk management equipment  - Apply yellow socks and bracelet for high fall risk patients  - Consider moving patient to room near nurses station  Outcome: Progressing  Goal: Maintain or return to baseline ADL function  Description: INTERVENTIONS:  -  Assess patient's ability to carry out ADLs; assess patient's baseline for ADL function and identify physical deficits which impact ability to perform ADLs (bathing, care of mouth/teeth, toileting, grooming, dressing, etc )  - Assess/evaluate cause of self-care deficits   - Assess range of motion  - Assess patient's mobility; develop plan if impaired  - Assess patient's need for assistive devices and provide as appropriate  - Encourage maximum independence but intervene and supervise when necessary  - Involve family in performance of ADLs  - Assess for home care needs following discharge   - Consider OT consult to assist with ADL evaluation and planning for discharge  - Provide patient education as appropriate  Outcome: Progressing  Goal: Maintains/Returns to pre admission functional level  Description: INTERVENTIONS:  - Perform BMAT or MOVE assessment daily    - Set and communicate daily mobility goal to care team and patient/family/caregiver     - Collaborate with rehabilitation services on mobility goals if consulted  - Out of bed for toileting  - Record patient progress and toleration of activity level   Outcome: Progressing     Problem: DISCHARGE PLANNING  Goal: Discharge to home or other facility with appropriate resources  Description: INTERVENTIONS:  - Identify barriers to discharge w/patient and caregiver  - Arrange for needed discharge resources and transportation as appropriate  - Identify discharge learning needs (meds, wound care, etc )  - Arrange for interpretive services to assist at discharge as needed  - Refer to Case Management Department for coordinating discharge planning if the patient needs post-hospital services based on physician/advanced practitioner order or complex needs related to functional status, cognitive ability, or social support system  Outcome: Progressing     Problem: Knowledge Deficit  Goal: Patient/family/caregiver demonstrates understanding of disease process, treatment plan, medications, and discharge instructions  Description: Complete learning assessment and assess knowledge base    Interventions:  - Provide teaching at level of understanding  - Provide teaching via preferred learning methods  Outcome: Progressing     Problem: SKIN/TISSUE INTEGRITY - ADULT  Goal: Skin Integrity remains intact(Skin Breakdown Prevention)  Description: Assess:  -Perform Pedro assessment   -Inspect skin when repositioning, toileting, and assisting with ADLS  -Assess under medical devices   -Assess extremities for adequate circulation and sensation     Bed Management:  -Have minimal linens on bed & keep smooth, unwrinkled  -Change linens as needed when moist or perspiring  -Avoid sitting or lying in one position     Activity:  -Mobilize patient   -Encourage activity and walks on unit  -Encourage or provide ROM exercises   -Turn and reposition patient   -Use appropriate equipment to lift or move patient in bed  -Instruct/ Assist with weight shifting     Skin Care:  -Avoid use of baby powder, tape, friction and shearing, hot water or constrictive clothing  -Relieve pressure over bony prominences   -Do not massage red bony areas    Next Steps:  -Teach patient strategies to minimize risks  Outcome: Progressing     Problem: HEMATOLOGIC - ADULT  Goal: Maintains hematologic stability  Description: INTERVENTIONS  - Assess for signs and symptoms of bleeding or hemorrhage  - Monitor labs  - Administer supportive blood products/factors as ordered and appropriate  Outcome: Progressing     Problem: MUSCULOSKELETAL - ADULT  Goal: Maintain or return mobility to safest level of function  Description: INTERVENTIONS:  - Assess patient's ability to carry out ADLs; assess patient's baseline for ADL function and identify physical deficits which impact ability to perform ADLs (bathing, care of mouth/teeth, toileting, grooming, dressing, etc )  - Assess/evaluate cause of self-care deficits   - Assess range of motion  - Assess patient's mobility  - Assess patient's need for assistive devices and provide as appropriate  - Encourage maximum independence but intervene and supervise when necessary  - Involve family in performance of ADLs  - Assess for home care needs following discharge   - Consider OT consult to assist with ADL evaluation and planning for discharge  - Provide patient education as appropriate  Outcome: Progressing  Goal: Maintain proper alignment of affected body part  Description: INTERVENTIONS:  - Support, maintain and protect limb and body alignment  - Provide patient/ family with appropriate education  Outcome: Progressing

## 2022-09-09 NOTE — ASSESSMENT & PLAN NOTE
· Noted on the right hand   · Typically maintained on indomethacin and colchicine, now discontinued in setting of renal function   · Place on PO prednisone 40 mg daily x 5 days for inflammatory treatment   · Additional plan as above

## 2022-09-09 NOTE — ED ATTENDING ATTESTATION
9/8/2022  IEmanuel MD, saw and evaluated the patient  I have discussed the patient with the resident/non-physician practitioner and agree with the resident's/non-physician practitioner's findings, Plan of Care, and MDM as documented in the resident's/non-physician practitioner's note, except where noted  All available labs and Radiology studies were reviewed  I was present for key portions of any procedure(s) performed by the resident/non-physician practitioner and I was immediately available to provide assistance  At this point I agree with the current assessment done in the Emergency Department  I have conducted an independent evaluation of this patient a history and physical is as follows:  Right-hand-dominant male with right index and middle finger swelling which has been going on for months  Patient has been seen by an urgent care center as well as General surgery, swelling has been getting worse and it has been more painful  Patient has been on antibiotics and had it lanced in an urgent care center with no improvement  Patient does have history of gout  On exam patient has erythema, a tophi at PIP    Will consult Hand surgery  ED Course         Critical Care Time  Procedures

## 2022-09-09 NOTE — ASSESSMENT & PLAN NOTE
Abscess of the right finger with developing possible abscess of the middle finger  Started on Bactrim DS; currently transition to vancomycin  Consulted Hand surgery/Orthopedics  NPO right now  Recheck CBC with metabolic profile in the morning

## 2022-09-09 NOTE — H&P
[facility]  H&P- Davin Christian 1960, 62 y o  male MRN: [id number]  Unit/Bed#: ED 03 Encounter: [id number]  Primary Care Provider: Katie Tobias MD   Date and time admitted to hospital: 9/8/2022  7:47 PM    * Abscess of right ring finger  Assessment & Plan  Abscess of the right finger with developing possible abscess of the middle finger  Started on Bactrim DS; currently transition to vancomycin  Consulted Hand surgery/Orthopedics  NPO right now  Recheck CBC with metabolic profile in the morning  Idiopathic chronic gout of multiple sites with tophus  Assessment & Plan  Patient states that he has gout of the hands; on indomethacin and colchicine  VTE Prophylaxis: Enoxaparin (Lovenox)  / sequential compression device   Code Status: Level 1 - Full Code as discussed with patient  POLST: There is no POLST form on file for this patient (pre-hospital)    Anticipated Length of Stay:  Patient will be admitted on an Inpatient basis with an anticipated length of stay of  greater than 2 midnights  Justification for Hospital Stay: Please see detailed plans noted above  Chief Complaint:     Finger infection  History of Present Illness:  Davin Christian is a 62 y o  male who has past medical history significant for gouty arthritis with gout attacks and was diagnosed to have gout of the ring finger however needed multiple incision and drainage procedures in the past   According to the patient as well the swelling of the middle finger is relatively new for the past month  The patient has been seen by outpatient General surgery and he was started on Bactrim  This has been since 2 weeks ago with no resolution of the swelling or pain  Because of the persistent swelling as well as the pain, he was then referred to [facility] for further intervention by had surgery      Currently, patient is on bed, he is comfortable relatively without any shortness of breath or fever or chills  No nausea or vomiting  He is complaining of pain of the middle and ring finger of the right hand with swelling noted  Redness noted  Review of Systems:    Constitutional:  Denies fever or chills   Eyes:  Denies change in visual acuity   HENT:  Denies nasal congestion or sore throat   Respiratory:  Denies cough or shortness of breath   Cardiovascular:  Denies chest pain or edema   GI:  Denies abdominal pain, nausea, vomiting, bloody stools or diarrhea   :  Denies dysuria   Musculoskeletal:  Hyperesthesia of the right middle and ring finger  Integument:  Denies rash   Neurologic:  Denies headache, focal weakness or sensory changes   Endocrine:  Denies polyuria or polydipsia   Lymphatic:  Denies swollen glands   Psychiatric:  Denies depression or anxiety     Past Medical and Surgical History:   Past Medical History:   Diagnosis Date    Known health problems: none      Past Surgical History:   Procedure Laterality Date    COLON SURGERY         Meds/Allergies:  (Not in a hospital admission)      Allergies: Allergies   Allergen Reactions    Cephalexin Edema     History:  Marital Status: Single   Occupation:  He is disabled  According to patient he had 75% of his colon removed and was with colostomy but currently reversed  Indication for colostomy:  Resection of diverticulitis  Patient Pre-hospital Living Situation:  Lives at home  Patient Pre-hospital Level of Mobility:  Ambulatory  Patient Pre-hospital Diet Restrictions:  Regular  Substance Use History:   Social History     Substance and Sexual Activity   Alcohol Use Yes    Comment: social     Social History     Tobacco Use   Smoking Status Never Smoker   Smokeless Tobacco Never Used     Social History     Substance and Sexual Activity   Drug Use Never       Family History:  History reviewed  No pertinent family history      Physical Exam:     Vitals:   Blood Pressure: 156/80 (09/09/22 0045)  Pulse: 70 (09/09/22 0045)  Temperature: 99 5 °F (37 5 °C) (09/08/22 1900)  Temp Source: Oral (09/08/22 1900)  Respirations: 16 (09/09/22 0045)  SpO2: 100 % (09/09/22 0045)    Constitutional:  Well developed, well nourished, no acute distress, non-toxic appearance   Eyes:  PERRL, conjunctiva normal   HENT:  Atraumatic, external ears normal, nose normal, oropharynx moist, no pharyngeal exudates  Neck- normal range of motion, no tenderness, supple   Respiratory:  No respiratory distress, normal breath sounds, no rales, no wheezing   Cardiovascular:  Normal rate, normal rhythm, no murmurs, no gallops, no rubs   GI:  Soft, nondistended, normal bowel sounds, nontender, no organomegaly, no mass, no rebound, no guarding   :  No costovertebral angle tenderness   Musculoskeletal:  No edema, tenderness with swelling  especially at the proximal interphalangeal joints of middle and ring finger  Back- no tenderness  Integument:  Well hydrated, no rash   Lymphatic:  No lymphadenopathy noted   Neurologic:  Alert &awake, communicative, CN 2-12 normal, no focal signs  Psychiatric:  Speech and behavior appropriate       Lab Results: I have personally reviewed pertinent reports  Results from last 7 days   Lab Units 09/08/22 2032   WBC Thousand/uL 7 08   HEMOGLOBIN g/dL 12 5   HEMATOCRIT % 36 1*   PLATELETS Thousands/uL 109*   NEUTROS PCT % 86*   LYMPHS PCT % 9*   MONOS PCT % 3*   EOS PCT % 1     Results from last 7 days   Lab Units 09/08/22 2032   POTASSIUM mmol/L 4 5   CHLORIDE mmol/L 99   CO2 mmol/L 21   BUN mg/dL 31*   CREATININE mg/dL 2 18*   CALCIUM mg/dL 9 5               Imaging: I have personally reviewed pertinent reports        X-ray hand right 3+ views    Result Date: 9/8/2022  Narrative: RIGHT HAND INDICATION:  L72 3: Sebaceous cyst  COMPARISON:  None VIEWS:  XR HAND 3+ VW RIGHT Images: 4 For the purposes of institution wide universal language the following terms will apply: (thumb=1st digit/finger, index finger=2nd digit/finger, long finger=3rd digit/finger, ring=4th digit/finger and small finger=5th digit/finger) FINDINGS: There is osteolytic/erosive changes involving the base of the 4th distal phalanx and head of the 4th middle phalanx  No other focal erosive changes are seen  There is no acute fracture or dislocation  Soft tissue swelling most notably about the 3rd PIP, 4th DIP and 2nd PIP joints  No soft tissue gas  Vascular calcifications  Impression: Soft tissue swelling 2nd through 4th digits as above with osteolytic/erosive changes about the 4th DIP joint  Differential considerations regarding the 4th digit include gouty arthropathy, rheumatoid arthritis as well as infection (osteomyelitis)  Clinical correlation is necessary  The study was marked in [facility] for immediate notification  Workstation performed: [id number]         ** Please Note: Dragon 360 Dictation voice to text software was used in the creation of this document   **

## 2022-09-09 NOTE — CONSULTS
Orthopedics   Zaynab Olmos 62 y o  male MRN: [id number]  Unit/Bed#: [facility]      Chief Complaint:   Right long finger and ring finger swelling    HPI:  62 y o  male right-hand dominant  Patient states he is been having pain and swelling in his right ring finger for the past 2 and half months  He states that he does not recall any traumatic injury, stating he must have accidentally bumped it while sleeping  Patient does have past medical history of gout and has been taking colchicine on and off as well as indomethacin  The right long finger had expressible pus that the patient was able to remove  Patient was seen in urgent care in [facility] 2 weeks ago, where he had an incision and drainage of the DIP joint of the right ring finger at the dorsal aspect  Patient was placed on Bactrim DS at that time, which she has been taking twice a day  This has not seemed to help his symptoms  Patient also states that about 2 days ago, he started noticing similar symptoms with swelling and erythema at the PIP of his right long finger  Patient was seen in urgent care yesterday, and was referred to General surgery at that time  [facility] was [facility] to [facility] for higher level of care  Patient is not currently working as he qualifies for disability following a loop colostomy with reversal     Review Of Systems:   · Skin: per HPI and physical exam  · Neuro: See HPI  · Musculoskeletal: See HPI  · 14 point review of systems negative except as stated above     Past Medical History:   Past Medical History:   Diagnosis Date    Known health problems: none        Past Surgical History:   Past Surgical History:   Procedure Laterality Date    COLON SURGERY         Family History:  Family history reviewed and non-contributory  History reviewed  No pertinent family history      Social History:  Social History     Socioeconomic History    Marital status: Single     Spouse name: None    Number of children: None    Years of education: None  Highest education level: None   Occupational History    None   Tobacco Use    Smoking status: Never Smoker    Smokeless tobacco: Never Used   Vaping Use    Vaping Use: Never used   Substance and Sexual Activity    Alcohol use: Yes     Comment: social    Drug use: Never    Sexual activity: None   Other Topics Concern    None   Social History Narrative    None     Social Determinants of Health     Financial Resource Strain: Not on file   Food Insecurity: Not on file   Transportation Needs: Not on file   Physical Activity: Not on file   Stress: Not on file   Social Connections: Not on file   Intimate Partner Violence: Not on file   Housing Stability: Not on file       Allergies: Allergies   Allergen Reactions    Cephalexin Edema           Labs:  0   Lab Value Date/Time    HCT 36 1 (L) 09/08/2022 2032    HGB 12 5 09/08/2022 2032    WBC 7 08 09/08/2022 2032    ESR 57 (H) 09/08/2022 2032    CRP 39 1 (H) 09/08/2022 2032       Meds:    Current Facility-Administered Medications:     sodium chloride 0 9 % bolus 1,000 mL, 1,000 mL, Intravenous, Once, Lexii Lr DO, Last Rate: 1,000 mL/hr at 09/08/22 2137, 1,000 mL at 09/08/22 2137    Current Outpatient Medications:     colchicine (COLCRYS) 0 6 mg tablet, Take 0 6 mg by mouth daily, Disp: , Rfl:     indomethacin (INDOCIN SR) 75 mg CR capsule, Take 1 capsule (75 mg total) by mouth 2 (two) times a day with meals Take with food  , Disp: 20 capsule, Rfl: 0    sulfamethoxazole-trimethoprim (BACTRIM DS) 800-160 mg per tablet, Take 1 tablet by mouth every 12 (twelve) hours for 7 days, Disp: 14 tablet, Rfl: 0    Blood Culture:   No results found for: BLOODCX    Wound Culture:   Lab Results   Component Value Date    WOUNDCULT 3+ Growth of Staphylococcus aureus (A) 08/29/2022       Ins and Outs:  No intake/output data recorded            Physical Exam:   /91   Pulse (!) 106   Temp 99 5 °F (37 5 °C) (Oral)   Resp 20   SpO2 98%   Gen: No acute distress, resting comfortably in bed  HEENT: Eyes clear, moist mucus membranes, hearing intact  Respiratory: No audible wheezing or stridor  Cardiovascular: Well Perfused peripherally, 2+ distal pulse  Abdomen: nondistended, no peritoneal signs  Musculoskeletal: right upper extremity  · Skin intact, erythema, swelling, and tophi appreciated at dorsum of PIP of long finger and DIP of ring finger  · Ulcer present at radial aspect of the dorsum of the DIP joint of the ring finger with no active or expressible purulence or drainage  · No draining sinus tract  · Minimal tenderness to palpation of long finger PIP joint or ring finger DIP joint  · Full active & passive ROM at all MCPs, PIP, and DIP joints of thumb, index finger and small finger  · Able to flex long finger PIP joint to 90 degrees actively and passively, very limited active flexion of DIP joint   · Able to tolerate 40 degrees of active flexion at PIP joint of ring finger, with only about 10 degrees of active flexion of DIP joint secondary to significant swelling  · Full flexion extension of all MCP joint of the right hand  · SILT m/r/u    · Motor intact ain/pin/m/r/u  · Palpable radial pulse  · Musculature is soft and compressible, no pain with passive stretch    Tertiary: no tenderness over all other joints/long bones as except already stated  Radiology:   I personally reviewed the films  X-ray right hand demonstrate soft tissue swelling of the ring finger and long finger with erosive changes at the DIP joint of the ring finger  Labs:  Temperature 99 5 F, /91, ESR 57, CRP 39 1, Uric acid 7 8    Assessment:  61 y o male with tophi and inflammatory changes of the ring finger DIP joint and long finger PIP joint, likely secondary to known diagnosis of gout  However, given radiographic findings and elevation of CRP and ESR in setting of longstanding symptoms, importance to consider possible osteomyelitis  Plan to follow-up on results of MRI      Plan: · NWB right hand  · IV Vanco  · Medical management for gout flare-up  · Follow up MRI results  · NPO at midnight  · Dispo: Ortho will follow    Brad Garcia MD

## 2022-09-09 NOTE — PLAN OF CARE
Problem: PAIN - ADULT  Goal: Verbalizes/displays adequate comfort level or baseline comfort level  Description: Interventions:  - Encourage patient to monitor pain and request assistance  - Assess pain using appropriate pain scale  - Administer analgesics based on type and severity of pain and evaluate response  - Implement non-pharmacological measures as appropriate and evaluate response  - Consider cultural and social influences on pain and pain management  - Notify physician/advanced practitioner if interventions unsuccessful or patient reports new pain  Outcome: Progressing     Problem: INFECTION - ADULT  Goal: Absence or prevention of progression during hospitalization  Description: INTERVENTIONS:  - Assess and monitor for signs and symptoms of infection  - Monitor lab/diagnostic results  - Monitor all insertion sites, i e  indwelling lines, tubes, and drains  - Monitor endotracheal if appropriate and nasal secretions for changes in amount and color  - [place] appropriate cooling/warming therapies per order  - Administer medications as ordered  - Instruct and encourage patient and family to use good hand hygiene technique  - Identify and instruct in appropriate isolation precautions for identified infection/condition  Outcome: Progressing  Goal: Absence of fever/infection during neutropenic period  Description: INTERVENTIONS:  - Monitor WBC    Outcome: Progressing     Problem: SAFETY ADULT  Goal: Patient will remain free of falls  Description: INTERVENTIONS:  - Educate patient/family on patient safety including physical limitations  - Instruct patient to call for assistance with activity   - Consult OT/PT to assist with strengthening/mobility   - Keep Call bell within reach  - Keep bed low and locked with side rails adjusted as appropriate  - Keep care items and personal belongings within reach  - Initiate and maintain comfort rounds  - Make Fall Risk Sign visible to staff  - Offer Toileting every 1 Hours, in advance of need  - Initiate/Maintain alarm  - Obtain necessary fall risk management equipment  - Apply yellow socks and bracelet for high fall risk patients  - Consider moving patient to room near nurses station  Outcome: Progressing  Goal: Maintain or return to baseline ADL function  Description: INTERVENTIONS:  -  Assess patient's ability to carry out ADLs; assess patient's baseline for ADL function and identify physical deficits which impact ability to perform ADLs (bathing, care of mouth/teeth, toileting, grooming, dressing, etc )  - Assess/evaluate cause of self-care deficits   - Assess range of motion  - Assess patient's mobility; develop plan if impaired  - Assess patient's need for assistive devices and provide as appropriate  - Encourage maximum independence but intervene and supervise when necessary  - Involve family in performance of ADLs  - Assess for home care needs following discharge   - Consider OT consult to assist with ADL evaluation and planning for discharge  - Provide patient education as appropriate  Outcome: Progressing  Goal: Maintains/Returns to pre admission functional level  Description: INTERVENTIONS:  - Perform BMAT or MOVE assessment daily    - Set and communicate daily mobility goal to care team and patient/family/caregiver  - Collaborate with rehabilitation services on mobility goals if consulted  - Perform Range of Motion 3 times a day  - Reposition patient every 2 hours    - Dangle patient 3 times a day  - Stand patient 3 times a day  - Ambulate patient 3 times a day  - Out of bed to chair 3 times a day   - Out of bed for meals 3 times a day  - Out of bed for toileting  - Record patient progress and toleration of activity level   Outcome: Progressing     Problem: DISCHARGE PLANNING  Goal: Discharge to home or other facility with appropriate resources  Description: INTERVENTIONS:  - Identify barriers to discharge w/patient and caregiver  - Arrange for needed discharge resources and transportation as appropriate  - Identify discharge learning needs (meds, wound care, etc )  - Arrange for interpretive services to assist at discharge as needed  - Refer to Case Management Department for coordinating discharge planning if the patient needs post-hospital services based on physician/advanced practitioner order or complex needs related to functional status, cognitive ability, or social support system  Outcome: Progressing     Problem: Knowledge Deficit  Goal: Patient/family/caregiver demonstrates understanding of disease process, treatment plan, medications, and discharge instructions  Description: Complete learning assessment and assess knowledge base    Interventions:  - Provide teaching at level of understanding  - Provide teaching via preferred learning methods  Outcome: Progressing     Problem: SKIN/TISSUE INTEGRITY - ADULT  Goal: Skin Integrity remains intact(Skin Breakdown Prevention)  Description: Assess:  -Perform Pedro assessment every shift  -Clean and moisturize skin every day  -Inspect skin when repositioning, toileting, and assisting with ADLS  -Assess under medical devices  -Assess extremities for adequate circulation and sensation     Bed Management:  -Have minimal linens on bed & keep smooth, unwrinkled  -Change linens as needed when moist or perspiring  -Avoid sitting or lying in one position for more than 1 hours while in bed  -Keep HOB at 30 degrees     Activity:  -Mobilize patient 3 times a day  -Encourage activity and walks on unit  -Encourage or provide ROM exercises   -Turn and reposition patient every 2 Hours  -Use appropriate equipment to lift or move patient in bed  -Instruct/ Assist with weight shifting every 15 min when out of bed in chair  -Consider limitation of chair time 2 hour intervals    Skin Care:  -Avoid use of baby powder, tape, friction and shearing, hot water or constrictive clothing  -Relieve pressure over bony prominences  -Do not massage red bony areas    Next Steps:  -Teach patient strategies to minimize risks    Outcome: Progressing     Problem: HEMATOLOGIC - ADULT  Goal: Maintains hematologic stability  Description: INTERVENTIONS  - Assess for signs and symptoms of bleeding or hemorrhage  - Monitor labs  - Administer supportive blood products/factors as ordered and appropriate  Outcome: Progressing     Problem: MUSCULOSKELETAL - ADULT  Goal: Maintain or return mobility to safest level of function  Description: INTERVENTIONS:  - Assess patient's ability to carry out ADLs; assess patient's baseline for ADL function and identify physical deficits which impact ability to perform ADLs (bathing, care of mouth/teeth, toileting, grooming, dressing, etc )  - Assess/evaluate cause of self-care deficits   - Assess range of motion  - Assess patient's mobility  - Assess patient's need for assistive devices and provide as appropriate  - Encourage maximum independence but intervene and supervise when necessary  - Involve family in performance of ADLs  - Assess for home care needs following discharge   - Consider OT consult to assist with ADL evaluation and planning for discharge  - Provide patient education as appropriate  Outcome: Progressing  Goal: Maintain proper alignment of affected body part  Description: INTERVENTIONS:  - Support, maintain and protect limb and body alignment  - Provide patient/ family with appropriate education  Outcome: Progressing

## 2022-09-10 ENCOUNTER — ANESTHESIA (INPATIENT)
Dept: PERIOP | Facility: HOSPITAL | Age: 62
DRG: 580 | End: 2022-09-10
Payer: MEDICARE

## 2022-09-10 LAB
ANION GAP SERPL CALCULATED.3IONS-SCNC: 7 MMOL/L (ref 4–13)
BUN SERPL-MCNC: 34 MG/DL (ref 5–25)
CALCIUM SERPL-MCNC: 8.8 MG/DL (ref 8.3–10.1)
CHLORIDE SERPL-SCNC: 107 MMOL/L (ref 96–108)
CO2 SERPL-SCNC: 21 MMOL/L (ref 21–32)
CREAT SERPL-MCNC: 1.38 MG/DL (ref 0.6–1.3)
ERYTHROCYTE [DISTWIDTH] IN BLOOD BY AUTOMATED COUNT: 12.6 % (ref 11.6–15.1)
GFR SERPL CREATININE-BSD FRML MDRD: 55 ML/MIN/1.73SQ M
GLUCOSE SERPL-MCNC: 134 MG/DL (ref 65–140)
GLUCOSE SERPL-MCNC: 144 MG/DL (ref 65–140)
GLUCOSE SERPL-MCNC: 186 MG/DL (ref 65–140)
GLUCOSE SERPL-MCNC: 296 MG/DL (ref 65–140)
GLUCOSE SERPL-MCNC: 378 MG/DL (ref 65–140)
HCT VFR BLD AUTO: 29.1 % (ref 36.5–49.3)
HGB BLD-MCNC: 10 G/DL (ref 12–17)
MCH RBC QN AUTO: 41.7 PG (ref 26.8–34.3)
MCHC RBC AUTO-ENTMCNC: 34.4 G/DL (ref 31.4–37.4)
MCV RBC AUTO: 121 FL (ref 82–98)
PLATELET # BLD AUTO: 53 THOUSANDS/UL (ref 149–390)
PMV BLD AUTO: 10.3 FL (ref 8.9–12.7)
POTASSIUM SERPL-SCNC: 4.7 MMOL/L (ref 3.5–5.3)
RBC # BLD AUTO: 2.4 MILLION/UL (ref 3.88–5.62)
SODIUM SERPL-SCNC: 135 MMOL/L (ref 135–147)
WBC # BLD AUTO: 5.85 THOUSAND/UL (ref 4.31–10.16)

## 2022-09-10 PROCEDURE — 82948 REAGENT STRIP/BLOOD GLUCOSE: CPT

## 2022-09-10 PROCEDURE — 99232 SBSQ HOSP IP/OBS MODERATE 35: CPT | Performed by: PHYSICIAN ASSISTANT

## 2022-09-10 PROCEDURE — 85027 COMPLETE CBC AUTOMATED: CPT | Performed by: PHYSICIAN ASSISTANT

## 2022-09-10 PROCEDURE — 0RCW0ZZ EXTIRPATION OF MATTER FROM RIGHT FINGER PHALANGEAL JOINT, OPEN APPROACH: ICD-10-PCS | Performed by: SURGERY

## 2022-09-10 PROCEDURE — 11044 DBRDMT BONE 1ST 20 SQ CM/<: CPT | Performed by: SURGERY

## 2022-09-10 PROCEDURE — NC001 PR NO CHARGE: Performed by: SURGERY

## 2022-09-10 PROCEDURE — 87176 TISSUE HOMOGENIZATION CULTR: CPT | Performed by: SURGERY

## 2022-09-10 PROCEDURE — 87205 SMEAR GRAM STAIN: CPT | Performed by: SURGERY

## 2022-09-10 PROCEDURE — 87186 SC STD MICRODIL/AGAR DIL: CPT | Performed by: SURGERY

## 2022-09-10 PROCEDURE — 26010 DRAINAGE OF FINGER ABSCESS: CPT | Performed by: SURGERY

## 2022-09-10 PROCEDURE — 87075 CULTR BACTERIA EXCEPT BLOOD: CPT | Performed by: SURGERY

## 2022-09-10 PROCEDURE — 87070 CULTURE OTHR SPECIMN AEROBIC: CPT | Performed by: SURGERY

## 2022-09-10 PROCEDURE — 0PBT0ZZ EXCISION OF RIGHT FINGER PHALANX, OPEN APPROACH: ICD-10-PCS | Performed by: SURGERY

## 2022-09-10 PROCEDURE — 80048 BASIC METABOLIC PNL TOTAL CA: CPT | Performed by: PHYSICIAN ASSISTANT

## 2022-09-10 RX ORDER — OXYCODONE HYDROCHLORIDE 5 MG/1
5 TABLET ORAL EVERY 4 HOURS PRN
Status: DISCONTINUED | OUTPATIENT
Start: 2022-09-10 | End: 2022-09-16 | Stop reason: HOSPADM

## 2022-09-10 RX ORDER — DEXAMETHASONE SODIUM PHOSPHATE 10 MG/ML
INJECTION, SOLUTION INTRAMUSCULAR; INTRAVENOUS AS NEEDED
Status: DISCONTINUED | OUTPATIENT
Start: 2022-09-10 | End: 2022-09-10

## 2022-09-10 RX ORDER — PROMETHAZINE HYDROCHLORIDE 25 MG/ML
12.5 INJECTION, SOLUTION INTRAMUSCULAR; INTRAVENOUS ONCE AS NEEDED
Status: DISCONTINUED | OUTPATIENT
Start: 2022-09-10 | End: 2022-09-10

## 2022-09-10 RX ORDER — PROPOFOL 10 MG/ML
INJECTION, EMULSION INTRAVENOUS AS NEEDED
Status: DISCONTINUED | OUTPATIENT
Start: 2022-09-10 | End: 2022-09-10

## 2022-09-10 RX ORDER — ONDANSETRON 2 MG/ML
4 INJECTION INTRAMUSCULAR; INTRAVENOUS ONCE AS NEEDED
Status: DISCONTINUED | OUTPATIENT
Start: 2022-09-10 | End: 2022-09-10 | Stop reason: HOSPADM

## 2022-09-10 RX ORDER — ONDANSETRON 2 MG/ML
INJECTION INTRAMUSCULAR; INTRAVENOUS AS NEEDED
Status: DISCONTINUED | OUTPATIENT
Start: 2022-09-10 | End: 2022-09-10

## 2022-09-10 RX ORDER — FENTANYL CITRATE/PF 50 MCG/ML
50 SYRINGE (ML) INJECTION
Status: DISCONTINUED | OUTPATIENT
Start: 2022-09-10 | End: 2022-09-10 | Stop reason: HOSPADM

## 2022-09-10 RX ORDER — ALBUTEROL SULFATE 2.5 MG/3ML
2.5 SOLUTION RESPIRATORY (INHALATION) ONCE AS NEEDED
Status: DISCONTINUED | OUTPATIENT
Start: 2022-09-10 | End: 2022-09-10 | Stop reason: HOSPADM

## 2022-09-10 RX ORDER — MIDAZOLAM HYDROCHLORIDE 2 MG/2ML
INJECTION, SOLUTION INTRAMUSCULAR; INTRAVENOUS AS NEEDED
Status: DISCONTINUED | OUTPATIENT
Start: 2022-09-10 | End: 2022-09-10

## 2022-09-10 RX ORDER — MAGNESIUM HYDROXIDE 1200 MG/15ML
LIQUID ORAL AS NEEDED
Status: DISCONTINUED | OUTPATIENT
Start: 2022-09-10 | End: 2022-09-10 | Stop reason: HOSPADM

## 2022-09-10 RX ORDER — FENTANYL CITRATE 50 UG/ML
INJECTION, SOLUTION INTRAMUSCULAR; INTRAVENOUS AS NEEDED
Status: DISCONTINUED | OUTPATIENT
Start: 2022-09-10 | End: 2022-09-10

## 2022-09-10 RX ORDER — CEFAZOLIN SODIUM 1 G/3ML
INJECTION, POWDER, FOR SOLUTION INTRAMUSCULAR; INTRAVENOUS AS NEEDED
Status: DISCONTINUED | OUTPATIENT
Start: 2022-09-10 | End: 2022-09-10

## 2022-09-10 RX ORDER — METOCLOPRAMIDE HYDROCHLORIDE 5 MG/ML
10 INJECTION INTRAMUSCULAR; INTRAVENOUS ONCE AS NEEDED
Status: DISCONTINUED | OUTPATIENT
Start: 2022-09-10 | End: 2022-09-10 | Stop reason: HOSPADM

## 2022-09-10 RX ORDER — LIDOCAINE HYDROCHLORIDE 10 MG/ML
0.5 INJECTION, SOLUTION EPIDURAL; INFILTRATION; INTRACAUDAL; PERINEURAL ONCE AS NEEDED
Status: CANCELLED | OUTPATIENT
Start: 2022-09-10

## 2022-09-10 RX ORDER — EPHEDRINE SULFATE 50 MG/ML
INJECTION INTRAVENOUS AS NEEDED
Status: DISCONTINUED | OUTPATIENT
Start: 2022-09-10 | End: 2022-09-10

## 2022-09-10 RX ORDER — HYDROMORPHONE HCL/PF 1 MG/ML
0.4 SYRINGE (ML) INJECTION
Status: DISCONTINUED | OUTPATIENT
Start: 2022-09-10 | End: 2022-09-10 | Stop reason: HOSPADM

## 2022-09-10 RX ORDER — SODIUM CHLORIDE, SODIUM LACTATE, POTASSIUM CHLORIDE, CALCIUM CHLORIDE 600; 310; 30; 20 MG/100ML; MG/100ML; MG/100ML; MG/100ML
INJECTION, SOLUTION INTRAVENOUS CONTINUOUS PRN
Status: DISCONTINUED | OUTPATIENT
Start: 2022-09-10 | End: 2022-09-10

## 2022-09-10 RX ORDER — LIDOCAINE HYDROCHLORIDE 10 MG/ML
INJECTION, SOLUTION EPIDURAL; INFILTRATION; INTRACAUDAL; PERINEURAL AS NEEDED
Status: DISCONTINUED | OUTPATIENT
Start: 2022-09-10 | End: 2022-09-10

## 2022-09-10 RX ADMIN — DEXAMETHASONE SODIUM PHOSPHATE 5 MG: 10 INJECTION, SOLUTION INTRAMUSCULAR; INTRAVENOUS at 08:38

## 2022-09-10 RX ADMIN — EPHEDRINE SULFATE 15 MG: 50 INJECTION INTRAVENOUS at 08:25

## 2022-09-10 RX ADMIN — CEFAZOLIN 2000 MG: 1 INJECTION, POWDER, FOR SOLUTION INTRAMUSCULAR; INTRAVENOUS at 08:19

## 2022-09-10 RX ADMIN — HEPARIN SODIUM 5000 UNITS: 5000 INJECTION INTRAVENOUS; SUBCUTANEOUS at 21:49

## 2022-09-10 RX ADMIN — PROPOFOL 150 MG: 10 INJECTION, EMULSION INTRAVENOUS at 08:17

## 2022-09-10 RX ADMIN — FENTANYL CITRATE 100 MCG: 50 INJECTION INTRAMUSCULAR; INTRAVENOUS at 08:17

## 2022-09-10 RX ADMIN — ONDANSETRON 4 MG: 2 INJECTION INTRAMUSCULAR; INTRAVENOUS at 08:38

## 2022-09-10 RX ADMIN — SODIUM CHLORIDE 75 ML/HR: 0.9 INJECTION, SOLUTION INTRAVENOUS at 09:22

## 2022-09-10 RX ADMIN — VANCOMYCIN HYDROCHLORIDE 1000 MG: 1 INJECTION, SOLUTION INTRAVENOUS at 01:15

## 2022-09-10 RX ADMIN — SODIUM CHLORIDE, SODIUM LACTATE, POTASSIUM CHLORIDE, AND CALCIUM CHLORIDE: .6; .31; .03; .02 INJECTION, SOLUTION INTRAVENOUS at 08:14

## 2022-09-10 RX ADMIN — MIDAZOLAM 2 MG: 1 INJECTION INTRAMUSCULAR; INTRAVENOUS at 08:15

## 2022-09-10 RX ADMIN — LIDOCAINE HYDROCHLORIDE 50 MG: 10 INJECTION, SOLUTION EPIDURAL; INFILTRATION; INTRACAUDAL; PERINEURAL at 08:17

## 2022-09-10 RX ADMIN — PREDNISONE 40 MG: 20 TABLET ORAL at 10:22

## 2022-09-10 RX ADMIN — HEPARIN SODIUM 5000 UNITS: 5000 INJECTION INTRAVENOUS; SUBCUTANEOUS at 14:55

## 2022-09-10 NOTE — PROGRESS NOTES
Progress Note - Orthopedics   Tanya Brizuela 62 y o  male MRN: [id number]      Subjective:  No acute events overnight  No acute distress  Denies fevers, chills, SOB  Objective:  A 10 point ROS was performed; negative except as noted above  Labs:  Lab Results   Component Value Date/Time    WBC 6 01 09/09/2022 04:31 AM    HGB 10 3 (L) 09/09/2022 04:31 AM       Physical:  Vitals:    09/10/22 0005   BP: 113/67   Pulse: 81   Resp:    Temp: 97 9 °F (36 6 °C)   SpO2: 99%     Musculoskeletal: right Upper Extremity  · Dressing cdi  · TTP over ring finger  · SILT m/r/u  · Motor intact ain/pin/m/r/u  · Fingers WWP    Assessment:    62 y o  male with Right ring Finger with wound and likely osteomyelitis    Plan:  · NWB RUE  · PT/OT  · Pain control  · IV abx  · NPO  · Dispo:  To OR for right ring finger I&D and possible amputation    Jamaal Mario MD

## 2022-09-10 NOTE — PROGRESS NOTES
Vancomycin Pharmacy Consult    Tanya Brizuela is an 62 y o  male who is currently receiving IV vancomycin for abscess/tenosynovitis of right hand  Vancomycin Assessment:  1  ID Consult: No  2  Cultures:    8/29 Wound Right Hand: 3+ MSSA  3  Procalcitonin:    n/a  4  Renal Function: LEX - improving (unknown baseline)   SCr: 1 38   CrCl: 51 mL/min   UOP: n/a  5  Days of Therapy: 2  6  Current Dose: 1000 mg IV prn level <20  7  Last Level: 14 9 (random 9/9 at 2152)  8  Goal AUC(24h): 400-600  9  Goal Random/Trough: random <20    Vancomycin Plan:  1  Evaluation: LEX improving, will continue current regime, but will likely be able to schedule a maintenance regimen on 9/11  2  New Dosing: continue 1000 mg IV prn level <20   Predicted Trough / AUC(24h): dose by level  3  Next Level: random 9/11 at [address] will continue to follow closely for s/sx of nephrotoxicity, infusion reactions, and appropriateness of therapy  BMP and CBC will be ordered per protocol  We will continue to follow the patients culture results and clinical progress daily  Lizette Cabrales, Gracy  Internal Medicine Clinical Pharmacist Specialist  [phone]  [facility]/Teams

## 2022-09-10 NOTE — OP NOTE
OPERATIVE REPORT  PATIENT NAME: Royal Kendrick  :  1960  MRN: [id number]  Pt Location: [facility] OR    SURGERY DATE: 09/10/22    Surgeon(s) and Role:     * Roselyn Gonzalez MD - Primary     * Jaylin Leong MD - Assisting    Pre-Op Diagnosis:  Gouty tophus ring finger  Bony destruction of distal phalanx    Post-Op Diagnosis Codes:     Gouty tophus of ring finger  Significant bony and ligamentous destruction ring finger    Procedure(s):  DEBRIDEMENT HAND/FINGER ([address] OUT) (Right)   Local skin advancement for wound coverage    Specimen(s):  Order Name Source Comment Collection Info Order Time   ANAEROBIC CULTURE AND GRAM STAIN Bone  Collected By: Roselyn Gonzalez MD 9/10/2022  8:36 AM     Release to patient through [address] [place] [address] [address]   Immediate        CULTURE, TISSUE AND GRAM STAIN Bone  Collected By: Roselyn Gonzalez MD 9/10/2022  8:36 AM     Release to patient through [employer] [facility] SemiNex Soft Tissue, Debridement  Collected By: Roselyn Gonzalez MD 9/10/2022  8:38 AM     Release to patient through Mychart   Immediate        CULTURE, TISSUE AND GRAM STAIN Soft Tissue, Debridement  Collected By: Roselyn Gonzalez MD 9/10/2022  8:38 AM     Release to patient through Mychart   Immediate            Estimated Blood Loss:   Minimal    Anesthesia Type:   Choice    IMPLANTS:  * No implants in log *    PERIOPERATIVE ANTIBIOTICS:    cefazolin, 2 grams    Tourniquet Time: 22 min at 250 mmHg          Operative Indications: The patient has a history of gouty tophus, with acute flare after stopping medication, wound on the ring finger dorsally secondary to attempted bedside decompression that was recalcitrant to conservative management    The decision was made to bring the patient to the operating room for  right ring finger irrigation debridement, local skin advancement, possible amputation Risks of the procedure were explained which include, but are not limited to bleeding; infection; damage to nerves, arteries,veins, tendons; scar; pain; need for reoperation; failure to give desired result; and risks of anaesthesia  All questions were answered to satisfaction and they were willing to proceed  Operative Findings:  Significant bony destruction of the distal phalanx  Complete destruction of the extensor mechanism to the DIP joint    Complications:   None    Procedure and Technique:  After the patient, site, and procedure were identified, the patient was brought into the operating room in a supine position  Local anaesthesia and sedation were provided  A well padded tourniquet was applied to the extremity, set at 250 mmHg  The  right upper extremity was then prepped and drapped in a normal, sterile, orthopedic fashion  Curvilinear incision was made approximately 3 cm in length in lips around the open wounds dorsally over the the open wound  The compromised skin was excised  Full-thickness skin flaps were elevated in significant chalky white material consistent with gouty deposits was noted throughout  Upon inspection of the deep tissues was complete destruction of the extensor mechanism as well as significant compromise of the distal phalanx  Bony cultures were obtained as well as aerobic and anaerobic soft tissue cultures  The gouty deposit was then gently debrided using a synovial rongeur, and Ray-Greg  Excisional debridement was performed including skin, fascia fat and tendon and bone a total area of 1 x 3 cm back to healthy bleeding tissue  The fingers then turned over and volarly a small stab incision was made over the gouty deposit and this was manually decompressed  Next 3 L of sterile saline were utilized to irrigate the finger and remove any other nonviable tissue  Local skin advancement was performed on the proximal skin shifting distally for approximately a total length 4 mm to close down the open wound space       At the completion of the procedure, hemostasis was obtained with cautery and direct pressure  The wounds were copiously irrigated with sterile solution  The wounds were closed with Prolene  Sterile dressings were applied, including Xeroform, Gauze and Finger Dressing  Please note, all sponge, needle, and instrument counts were correct prior to closure  Loupe magnification was utilized  The patient tolerated the procedure well       I was present for the entire procedure    Patient Disposition:  PACU     SIGNATURE: Coco Galindo MD  DATE: 09/10/22  TIME: 9:06 AM

## 2022-09-10 NOTE — PLAN OF CARE
Problem: PAIN - ADULT  Goal: Verbalizes/displays adequate comfort level or baseline comfort level  Description: Interventions:  - Encourage patient to monitor pain and request assistance  - Assess pain using appropriate pain scale  - Administer analgesics based on type and severity of pain and evaluate response  - Implement non-pharmacological measures as appropriate and evaluate response  - Consider cultural and social influences on pain and pain management  - Notify physician/advanced practitioner if interventions unsuccessful or patient reports new pain  Outcome: Progressing     Problem: INFECTION - ADULT  Goal: Absence or prevention of progression during hospitalization  Description: INTERVENTIONS:  - Assess and monitor for signs and symptoms of infection  - Monitor lab/diagnostic results  - Monitor all insertion sites, i e  indwelling lines, tubes, and drains  - Monitor endotracheal if appropriate and nasal secretions for changes in amount and color  - [place] appropriate cooling/warming therapies per order  - Administer medications as ordered  - Instruct and encourage patient and family to use good hand hygiene technique  - Identify and instruct in appropriate isolation precautions for identified infection/condition  Outcome: Progressing  Goal: Absence of fever/infection during neutropenic period  Description: INTERVENTIONS:  - Monitor WBC    Outcome: Progressing     Problem: SAFETY ADULT  Goal: Patient will remain free of falls  Description: INTERVENTIONS:  - Educate patient/family on patient safety including physical limitations  - Instruct patient to call for assistance with activity   - Consult OT/PT to assist with strengthening/mobility   - Keep Call bell within reach  - Keep bed low and locked with side rails adjusted as appropriate  - Keep care items and personal belongings within reach  - Initiate and maintain comfort rounds  - Make Fall Risk Sign visible to staff  - Offer Toileting every 2 Hours, in advance of need  - Initiate/Maintain bedalarm  - Obtain necessary fall risk management equipment:   - Apply yellow socks and bracelet for high fall risk patients  - Consider moving patient to room near nurses station  Outcome: Progressing  Goal: Maintain or return to baseline ADL function  Description: INTERVENTIONS:  -  Assess patient's ability to carry out ADLs; assess patient's baseline for ADL function and identify physical deficits which impact ability to perform ADLs (bathing, care of mouth/teeth, toileting, grooming, dressing, etc )  - Assess/evaluate cause of self-care deficits   - Assess range of motion  - Assess patient's mobility; develop plan if impaired  - Assess patient's need for assistive devices and provide as appropriate  - Encourage maximum independence but intervene and supervise when necessary  - Involve family in performance of ADLs  - Assess for home care needs following discharge   - Consider OT consult to assist with ADL evaluation and planning for discharge  - Provide patient education as appropriate  Outcome: Progressing  Goal: Maintains/Returns to pre admission functional level  Description: INTERVENTIONS:  - Perform BMAT or MOVE assessment daily    - Set and communicate daily mobility goal to care team and patient/family/caregiver  - Collaborate with rehabilitation services on mobility goals if consulted  - Perform Range of Motion 3 times a day  - Reposition patient every 2 hours    - Dangle patient 3 times a day  - Stand patient 3 times a day  - Ambulate patient 3 times a day  - Out of bed to chair 3 times a day   - Out of bed for meals 3 times a day  - Out of bed for toileting  - Record patient progress and toleration of activity level   Outcome: Progressing     Problem: DISCHARGE PLANNING  Goal: Discharge to home or other facility with appropriate resources  Description: INTERVENTIONS:  - Identify barriers to discharge w/patient and caregiver  - Arrange for needed discharge resources and transportation as appropriate  - Identify discharge learning needs (meds, wound care, etc )  - Arrange for interpretive services to assist at discharge as needed  - Refer to Case Management Department for coordinating discharge planning if the patient needs post-hospital services based on physician/advanced practitioner order or complex needs related to functional status, cognitive ability, or social support system  Outcome: Progressing     Problem: Knowledge Deficit  Goal: Patient/family/caregiver demonstrates understanding of disease process, treatment plan, medications, and discharge instructions  Description: Complete learning assessment and assess knowledge base    Interventions:  - Provide teaching at level of understanding  - Provide teaching via preferred learning methods  Outcome: Progressing     Problem: HEMATOLOGIC - ADULT  Goal: Maintains hematologic stability  Description: INTERVENTIONS  - Assess for signs and symptoms of bleeding or hemorrhage  - Monitor labs  - Administer supportive blood products/factors as ordered and appropriate  Outcome: Progressing     Problem: MUSCULOSKELETAL - ADULT  Goal: Maintain or return mobility to safest level of function  Description: INTERVENTIONS:  - Assess patient's ability to carry out ADLs; assess patient's baseline for ADL function and identify physical deficits which impact ability to perform ADLs (bathing, care of mouth/teeth, toileting, grooming, dressing, etc )  - Assess/evaluate cause of self-care deficits   - Assess range of motion  - Assess patient's mobility  - Assess patient's need for assistive devices and provide as appropriate  - Encourage maximum independence but intervene and supervise when necessary  - Involve family in performance of ADLs  - Assess for home care needs following discharge   - Consider OT consult to assist with ADL evaluation and planning for discharge  - Provide patient education as appropriate  Outcome: Progressing  Goal: Maintain proper alignment of affected body part  Description: INTERVENTIONS:  - Support, maintain and protect limb and body alignment  - Provide patient/ family with appropriate education  Outcome: Progressing

## 2022-09-10 NOTE — ASSESSMENT & PLAN NOTE
· POA, sodium 129  · Likely in setting of poor PO intake as an outpatient, reports he had poor appetite while on Bactrim outpatient and did not eat much   · Sodium 135 this AM following IV fluids   · Resolved

## 2022-09-10 NOTE — ASSESSMENT & PLAN NOTE
· Today in 46s    If drops any lower would d/c heparin (heparin not causing this as POA)  · Appears chronic, was present on labs in August of 2020  · Trend CBC  · No evidence of active bleeding

## 2022-09-10 NOTE — PROGRESS NOTES
[address] [facility]  Progress Note - Isidro Keller 1960, 62 y o  male MRN: [id number]  Unit/Bed#: [facility] 623-01 Encounter: [id number]  Primary Care Provider: Smiley Wynn MD   Date and time admitted to hospital: 9/8/2022  7:47 PM    * Tenosynovitis of right hand  Assessment & Plan  · Pt presented with right hand tophi gout with swelling and pain   · Ortho following,  · MRI obtained - Destructive change of the distal aspect of the 4th digit middle phalanx and base of the 4th digit distal phalanx with adjacent joint effusion  Trace tenosynovitis of the 4th digit flexor tendon at level of base of 4th digit middle phalanx likely inflammatory  Infectious tenosynovitis is less likely given appearance   · S/p Bactrim as an outpatient without improvement -- wound culture from 8/31 with Staph aureus   · Currently on IV Vancomycin  · NWB RUE, s/p debridement/wash out with hand surgery on 9/10  · Start on PO Prednisone for gout treatment, d/c colchicine in setting of LEX  · SED rate and CRP elevated    Thrombocytopenia ([facility] [address] )  Assessment & Plan  · Today in 46s    If drops any lower would d/c heparin (heparin not causing this as POA)  · Appears chronic, was present on labs in August of 2020  · Trend CBC  · No evidence of active bleeding     Hyperglycemia  Assessment & Plan  · Noted on BMP  · Hgb A1c is 6 8, diabetic   · Fingersticks while on steroids   · Monitor     LEX (acute kidney injury) ([facility] [address] )  Assessment & Plan  · POA, Cr  2 18  · Per review of outpatient records in care everywhere, most recent Cr noted to be 0 7  · Likely in setting of volume depletion and dehydration from poor PO intake over the last week   · Place on IVF hydration with improvement, Cr today 1 38  · Trend BMP closely   · Avoid NSAIDs, colchicine discontinued   · Avoid hypotension    Hyponatremia  Assessment & Plan  · POA, sodium 129  · Likely in setting of poor PO intake as an outpatient, reports he had poor appetite while on Bactrim outpatient and did not eat much   · Sodium 135 this AM following IV fluids   · Resolved     Idiopathic chronic gout of multiple sites with tophus  Assessment & Plan  · Noted on the right hand   · Typically maintained on indomethacin and colchicine, now discontinued in setting of renal function   · Place on PO prednisone 40 mg daily x 5 days for inflammatory treatment   · Additional plan as above         VTE Pharmacologic Prophylaxis: VTE Score: 3 Moderate Risk (Score 3-4) - Pharmacological DVT Prophylaxis Ordered: heparin  Patient Centered Rounds: I performed bedside rounds with nursing staff today  Discussions with Specialists or Other Care Team Provider: RN    Education and Discussions with Family / Patient: Patient declined call to   Time Spent for Care: 20 minutes  More than 50% of total time spent on counseling and coordination of care as described above  Current Length of Stay: 1 day(s)  Current Patient Status: Inpatient   Certification Statement: The patient will continue to require additional inpatient hospital stay due to IV abx, post op mgmt  Discharge Plan: Anticipate discharge in 48-72 hrs to home  Code Status: Level 1 - Full Code    Subjective:   Patient has no acute complaints, hand is numb immediately post op  Objective:     Vitals:   Temp (24hrs), Av 7 °F (36 5 °C), Min:97 5 °F (36 4 °C), Max:98 1 °F (36 7 °C)    Temp:  [97 5 °F (36 4 °C)-98 1 °F (36 7 °C)] 97 5 °F (36 4 °C)  HR:  [70-83] 70  Resp:  [15-21] 16  BP: (113-140)/(66-73) 132/72  SpO2:  [99 %-100 %] 100 %  There is no height or weight on file to calculate BMI  Input and Output Summary (last 24 hours): Intake/Output Summary (Last 24 hours) at 9/10/2022 1446  Last data filed at 9/10/2022 0852  Gross per 24 hour   Intake 500 ml   Output --   Net 500 ml       Physical Exam:   Physical Exam  Vitals reviewed  Constitutional:       General: He is not in acute distress       Appearance: He is not toxic-appearing  HENT:      Head: Normocephalic and atraumatic  Eyes:      Extraocular Movements: Extraocular movements intact  Pulmonary:      Effort: Pulmonary effort is normal  No respiratory distress  Musculoskeletal:         General: Normal range of motion  Cervical back: Normal range of motion  Comments: RT hand finger wrapped    Neurological:      General: No focal deficit present  Mental Status: He is alert and oriented to person, place, and time  Psychiatric:         Mood and Affect: Mood normal          Behavior: Behavior normal          Thought Content:  Thought content normal           Additional Data:     Labs:  Results from last 7 days   Lab Units 09/10/22  0600 09/09/22  0431 09/08/22  2032   WBC Thousand/uL 5 85 6 01 7 08   HEMOGLOBIN g/dL 10 0* 10 3* 12 5   HEMATOCRIT % 29 1* 29 4* 36 1*   PLATELETS Thousands/uL 53*  --  109*   NEUTROS PCT %  --   --  86*   LYMPHS PCT %  --   --  9*   LYMPHO PCT %  --  18  --    MONOS PCT %  --   --  3*   MONO PCT %  --  0*  --    EOS PCT %  --  3 1     Results from last 7 days   Lab Units 09/10/22  0600 09/09/22  0431   SODIUM mmol/L 135 131*   POTASSIUM mmol/L 4 7 4 3   CHLORIDE mmol/L 107 105   CO2 mmol/L 21 21   BUN mg/dL 34* 33*   CREATININE mg/dL 1 38* 2 13*   ANION GAP mmol/L 7 5   CALCIUM mg/dL 8 8 8 5   ALBUMIN g/dL  --  2 9*   TOTAL BILIRUBIN mg/dL  --  0 56   ALK PHOS U/L  --  86   ALT U/L  --  33   AST U/L  --  31   GLUCOSE RANDOM mg/dL 144* 122     Results from last 7 days   Lab Units 09/09/22  0431   INR  1 13     Results from last 7 days   Lab Units 09/10/22  1139 09/10/22  0731 09/09/22  2133 09/09/22  1759 09/09/22  1201   POC GLUCOSE mg/dl 186* 134 233* 268* 147*     Results from last 7 days   Lab Units 09/09/22  0431   HEMOGLOBIN A1C % 6 8*           Lines/Drains:  Invasive Devices  Report    Peripheral Intravenous Line  Duration           Peripheral IV 09/09/22 Left;Ventral (anterior) Forearm <1 day Imaging: No pertinent imaging reviewed  Recent Cultures (last 7 days):         Last 24 Hours Medication List:   Current Facility-Administered Medications   Medication Dose Route Frequency Provider Last Rate    acetaminophen  650 mg Oral Q6H PRN Ean Leong MD      aluminum-magnesium hydroxide-simethicone  30 mL Oral Q6H PRN Ean Leong MD      docusate sodium  100 mg Oral BID PRN Ean Leong MD      heparin (porcine)  5,000 Units Subcutaneous [facility] Ean Leong MD      ondansetron  4 mg Intravenous Q6H PRN Leotis Riedel, MD      oxyCODONE  5 mg Oral Q4H PRN Leotis Riedel, MD      predniSONE  40 mg Oral Daily Leotis Riedel, MD      sodium chloride  75 mL/hr Intravenous Continuous Leotis Riedel, MD 75 mL/hr (09/10/22 1233)    vancomycin  15 mg/kg Intravenous Daily PRN Leotis Riedel, MD          Today, Patient Was Seen By: Danae Kidd PA-C    **Please Note: This note may have been constructed using a voice recognition system  **

## 2022-09-10 NOTE — PHYSICAL THERAPY NOTE
PHYSICAL THERAPY CANCEL NOTE          Patient Name: Rafael Desai  Today's Date: 9/10/2022       09/10/22 0800   PT Last Visit   PT Visit Date 09/10/22   Note Type   Note type Cancelled Session; Evaluation   Cancel Reasons Patient to operating room   Orders received and chart reviewed  Pt to OR for R ring finger debridement and amputation  Will hold and follow post op as able  Thank you      Florida Lopez, PT

## 2022-09-10 NOTE — ANESTHESIA PREPROCEDURE EVALUATION
Procedure:  DEBRIDEMENT HAND/FINGER ([address] OUT) (Right Ring Finger)  AMPUTATION FINGER (Right Ring Finger)    Relevant Problems   /RENAL   (+) LEX (acute kidney injury) ([address] )      HEMATOLOGY   (+) Thrombocytopenia (HCC)      MUSCULOSKELETAL   (+) Idiopathic chronic gout of multiple sites with tophus      Musculoskeletal and Integument   (+) Tenosynovitis of right hand      Other   (+) Hyperglycemia   (+) Hyponatremia      Normal sinus rhythm  ST & T wave abnormality, consider anterior ischemia  Abnormal ECG  No previous ECGs available  Confirmed by Misha Sharma ([id number]) on 9/9/2022 8:39:20 PM  Physical Exam    Airway    Mallampati score: II  TM Distance: <3 FB  Neck ROM: full     Dental   Comment: Very poor dentition with broken and rotting teeth  Denies loose teeth, No notable dental hx     Cardiovascular  Cardiovascular exam normal    Pulmonary  Pulmonary exam normal Breath sounds clear to auscultation,     Other Findings        Anesthesia Plan  ASA Score- 2 Emergent    Anesthesia Type- general with ASA Monitors  Additional Monitors:   Airway Plan: LMA  Plan Factors-    Chart reviewed  EKG reviewed  Imaging results reviewed  Existing labs reviewed  Patient summary reviewed  Patient is not a current smoker  Patient instructed to abstain from smoking on day of procedure  Patient did not smoke on day of surgery  Obstructive sleep apnea risk education given perioperatively  Induction- intravenous  Postoperative Plan- Plan for postoperative opioid use  Planned trial extubation    Informed Consent- Anesthetic plan and risks discussed with patient  I personally reviewed this patient with the CRNA  Discussed and agreed on the Anesthesia Plan with the CRNA             Lab Results   Component Value Date    WBC 6 01 09/09/2022    HGB 10 3 (L) 09/09/2022    HCT 29 4 (L) 09/09/2022     (H) 09/09/2022    PLT  09/09/2022      Comment:      Unable to estimate due to clumped platelets  This is a corrected result  Previous result was 83 Thousands/uL on 9/9/2022 at 0529 EDT     Lab Results   Component Value Date    CALCIUM 8 8 09/10/2022    K 4 7 09/10/2022    CO2 21 09/10/2022     09/10/2022    BUN 34 (H) 09/10/2022    CREATININE 1 38 (H) 09/10/2022     Lab Results   Component Value Date    INR 1 13 09/09/2022    PROTIME 14 7 (H) 09/09/2022     Lab Results   Component Value Date    PTT 26 09/09/2022     Type and Screen:  A        Discussed with pt the benefits/alternatives and risks or General Anesthesia including breathing tube remaining in place if not strong enough, PONV, damage to lips and teeth, sore throat, eye injury or blindness    I, Dr Italia Rutledge, the attending physician, have personally seen and evaluated the patient prior to anesthetic care  I have reviewed the pre-anesthetic record, and other medical records if appropriate to the anesthetic care  If a CRNA is involved in the case, I have reviewed the CRNA assessment, if present, and agree  The patient is in a suitable condition to proceed with my formulated anesthetic plan

## 2022-09-10 NOTE — ASSESSMENT & PLAN NOTE
· Pt presented with right hand tophi gout with swelling and pain   · Ortho following,  · MRI obtained - Destructive change of the distal aspect of the 4th digit middle phalanx and base of the 4th digit distal phalanx with adjacent joint effusion  Trace tenosynovitis of the 4th digit flexor tendon at level of base of 4th digit middle phalanx likely inflammatory   Infectious tenosynovitis is less likely given appearance   · S/p Bactrim as an outpatient without improvement -- wound culture from 8/31 with Staph aureus   · Currently on IV Vancomycin  · NWB RUE, s/p debridement/wash out with hand surgery on 9/10  · Start on PO Prednisone for gout treatment, d/c colchicine in setting of LEX  · SED rate and CRP elevated

## 2022-09-10 NOTE — DISCHARGE INSTRUCTIONS
Discharge Instructions - Orthopedics  Minnie White 62 y o  male MRN: [id number]  Unit/Bed#: Operating Room    Weight Bearing Status:                                           Nonweightbearing to right hand    DVT prophylaxis  none    Pain:  Continue analgesics as directed    Dressing Instructions:   Please keep clean, dry and intact until follow up     Appt Instructions: If you do not have your appointment, please call the clinic at [phone] t  Otherwise followup as scheduled     Contact the office sooner if you experience any increased numbness/tingling in the extremities        Miscellaneous:  F/u one week

## 2022-09-10 NOTE — ANESTHESIA POSTPROCEDURE EVALUATION
Post-Op Assessment Note    CV Status:  Stable  Pain Score: 0    Pain management: adequate     Mental Status:  Alert and awake   Hydration Status:  Euvolemic   PONV Controlled:  Controlled   Airway Patency:  Patent      Post Op Vitals Reviewed: Yes      Staff: CRNA         No complications documented      BP   136/69   Temp 97 8   Pulse 80   Resp 16   SpO2 100

## 2022-09-10 NOTE — OCCUPATIONAL THERAPY NOTE
Occupational Therapy Cancel Note         Patient Name: Angie Zepeda  Today's Date: 9/10/2022         09/10/22 0805   OT Last Visit   OT Visit Date 09/10/22   Note Type   Note type Cancelled Session   Cancel Reasons Patient to operating room       OT orders received  Chart reviewed  Pt currently at OR for R ring finger debridement and amputation  Will continue to follow and see pt as appropriate and able post-op      Ninfa Cedeno MS, OTR/L

## 2022-09-10 NOTE — ASSESSMENT & PLAN NOTE
· POA, Cr  2 18  · Per review of outpatient records in care everywhere, most recent Cr noted to be 0 7  · Likely in setting of volume depletion and dehydration from poor PO intake over the last week   · Place on IVF hydration with improvement, Cr today 1 38  · Trend BMP closely   · Avoid NSAIDs, colchicine discontinued   · Avoid hypotension

## 2022-09-11 PROBLEM — E11.9 CONTROLLED TYPE 2 DIABETES MELLITUS, WITHOUT LONG-TERM CURRENT USE OF INSULIN (HCC): Status: ACTIVE | Noted: 2022-09-09

## 2022-09-11 LAB
ANION GAP SERPL CALCULATED.3IONS-SCNC: 7 MMOL/L (ref 4–13)
BASOPHILS # BLD AUTO: 0.01 THOUSANDS/ΜL (ref 0–0.1)
BASOPHILS NFR BLD AUTO: 0 % (ref 0–1)
BUN SERPL-MCNC: 31 MG/DL (ref 5–25)
CALCIUM SERPL-MCNC: 8.9 MG/DL (ref 8.3–10.1)
CHLORIDE SERPL-SCNC: 106 MMOL/L (ref 96–108)
CO2 SERPL-SCNC: 21 MMOL/L (ref 21–32)
CREAT SERPL-MCNC: 1.2 MG/DL (ref 0.6–1.3)
EOSINOPHIL # BLD AUTO: 0 THOUSAND/ΜL (ref 0–0.61)
EOSINOPHIL NFR BLD AUTO: 0 % (ref 0–6)
ERYTHROCYTE [DISTWIDTH] IN BLOOD BY AUTOMATED COUNT: 12.4 % (ref 11.6–15.1)
ERYTHROCYTE [DISTWIDTH] IN BLOOD BY AUTOMATED COUNT: 12.5 % (ref 11.6–15.1)
FOLATE SERPL-MCNC: 1.9 NG/ML (ref 3.1–17.5)
GFR SERPL CREATININE-BSD FRML MDRD: 65 ML/MIN/1.73SQ M
GLUCOSE SERPL-MCNC: 209 MG/DL (ref 65–140)
GLUCOSE SERPL-MCNC: 213 MG/DL (ref 65–140)
GLUCOSE SERPL-MCNC: 317 MG/DL (ref 65–140)
GLUCOSE SERPL-MCNC: 354 MG/DL (ref 65–140)
GLUCOSE SERPL-MCNC: 364 MG/DL (ref 65–140)
HAV IGM SER QL: NORMAL
HBV CORE IGM SER QL: NORMAL
HBV SURFACE AG SER QL: NORMAL
HCT VFR BLD AUTO: 28.2 % (ref 36.5–49.3)
HCT VFR BLD AUTO: 29.5 % (ref 36.5–49.3)
HCV AB SER QL: NORMAL
HGB BLD-MCNC: 10 G/DL (ref 12–17)
HGB BLD-MCNC: 10.3 G/DL (ref 12–17)
IMM GRANULOCYTES # BLD AUTO: 0.02 THOUSAND/UL (ref 0–0.2)
IMM GRANULOCYTES NFR BLD AUTO: 0 % (ref 0–2)
LDH SERPL-CCNC: 139 U/L (ref 81–234)
LYMPHOCYTES # BLD AUTO: 0.57 THOUSANDS/ΜL (ref 0.6–4.47)
LYMPHOCYTES NFR BLD AUTO: 11 % (ref 14–44)
MCH RBC QN AUTO: 41.7 PG (ref 26.8–34.3)
MCH RBC QN AUTO: 41.9 PG (ref 26.8–34.3)
MCHC RBC AUTO-ENTMCNC: 34.9 G/DL (ref 31.4–37.4)
MCHC RBC AUTO-ENTMCNC: 35.5 G/DL (ref 31.4–37.4)
MCV RBC AUTO: 118 FL (ref 82–98)
MCV RBC AUTO: 120 FL (ref 82–98)
MONOCYTES # BLD AUTO: 0.08 THOUSAND/ΜL (ref 0.17–1.22)
MONOCYTES NFR BLD AUTO: 2 % (ref 4–12)
NEUTROPHILS # BLD AUTO: 4.5 THOUSANDS/ΜL (ref 1.85–7.62)
NEUTS SEG NFR BLD AUTO: 87 % (ref 43–75)
NRBC BLD AUTO-RTO: 0 /100 WBCS
PLATELET # BLD AUTO: 43 THOUSANDS/UL (ref 149–390)
PLATELET # BLD AUTO: 46 THOUSANDS/UL (ref 149–390)
PMV BLD AUTO: 10.6 FL (ref 8.9–12.7)
PMV BLD AUTO: 10.8 FL (ref 8.9–12.7)
POTASSIUM SERPL-SCNC: 4.8 MMOL/L (ref 3.5–5.3)
RBC # BLD AUTO: 2.4 MILLION/UL (ref 3.88–5.62)
RBC # BLD AUTO: 2.46 MILLION/UL (ref 3.88–5.62)
RETICS # AUTO: ABNORMAL 10*3/UL (ref 14356–105094)
RETICS # CALC: 0.51 % (ref 0.37–1.87)
SODIUM SERPL-SCNC: 134 MMOL/L (ref 135–147)
VANCOMYCIN SERPL-MCNC: 11.1 UG/ML (ref 10–20)
VIT B12 SERPL-MCNC: 156 PG/ML (ref 100–900)
WBC # BLD AUTO: 5.18 THOUSAND/UL (ref 4.31–10.16)
WBC # BLD AUTO: 5.76 THOUSAND/UL (ref 4.31–10.16)

## 2022-09-11 PROCEDURE — 85027 COMPLETE CBC AUTOMATED: CPT | Performed by: INTERNAL MEDICINE

## 2022-09-11 PROCEDURE — 82607 VITAMIN B-12: CPT | Performed by: INTERNAL MEDICINE

## 2022-09-11 PROCEDURE — 84165 PROTEIN E-PHORESIS SERUM: CPT | Performed by: INTERNAL MEDICINE

## 2022-09-11 PROCEDURE — 87389 HIV-1 AG W/HIV-1&-2 AB AG IA: CPT | Performed by: INTERNAL MEDICINE

## 2022-09-11 PROCEDURE — 82746 ASSAY OF FOLIC ACID SERUM: CPT | Performed by: INTERNAL MEDICINE

## 2022-09-11 PROCEDURE — NC001 PR NO CHARGE: Performed by: SURGERY

## 2022-09-11 PROCEDURE — 97166 OT EVAL MOD COMPLEX 45 MIN: CPT

## 2022-09-11 PROCEDURE — 83615 LACTATE (LD) (LDH) ENZYME: CPT | Performed by: INTERNAL MEDICINE

## 2022-09-11 PROCEDURE — 80202 ASSAY OF VANCOMYCIN: CPT | Performed by: PHYSICIAN ASSISTANT

## 2022-09-11 PROCEDURE — 99223 1ST HOSP IP/OBS HIGH 75: CPT | Performed by: INTERNAL MEDICINE

## 2022-09-11 PROCEDURE — 85045 AUTOMATED RETICULOCYTE COUNT: CPT | Performed by: INTERNAL MEDICINE

## 2022-09-11 PROCEDURE — 83521 IG LIGHT CHAINS FREE EACH: CPT | Performed by: INTERNAL MEDICINE

## 2022-09-11 PROCEDURE — 80074 ACUTE HEPATITIS PANEL: CPT | Performed by: INTERNAL MEDICINE

## 2022-09-11 PROCEDURE — 80048 BASIC METABOLIC PNL TOTAL CA: CPT | Performed by: PHYSICIAN ASSISTANT

## 2022-09-11 PROCEDURE — 99232 SBSQ HOSP IP/OBS MODERATE 35: CPT | Performed by: PHYSICIAN ASSISTANT

## 2022-09-11 PROCEDURE — 99222 1ST HOSP IP/OBS MODERATE 55: CPT | Performed by: INTERNAL MEDICINE

## 2022-09-11 PROCEDURE — 82948 REAGENT STRIP/BLOOD GLUCOSE: CPT

## 2022-09-11 PROCEDURE — 83010 ASSAY OF HAPTOGLOBIN QUANT: CPT | Performed by: INTERNAL MEDICINE

## 2022-09-11 PROCEDURE — 85025 COMPLETE CBC W/AUTO DIFF WBC: CPT | Performed by: PHYSICIAN ASSISTANT

## 2022-09-11 RX ORDER — INSULIN LISPRO 100 [IU]/ML
1-5 INJECTION, SOLUTION INTRAVENOUS; SUBCUTANEOUS
Status: DISCONTINUED | OUTPATIENT
Start: 2022-09-11 | End: 2022-09-16 | Stop reason: HOSPADM

## 2022-09-11 RX ORDER — VANCOMYCIN HYDROCHLORIDE 1 G/200ML
12.5 INJECTION, SOLUTION INTRAVENOUS EVERY 12 HOURS
Status: DISCONTINUED | OUTPATIENT
Start: 2022-09-11 | End: 2022-09-13

## 2022-09-11 RX ADMIN — VANCOMYCIN HYDROCHLORIDE 1000 MG: 1 INJECTION, SOLUTION INTRAVENOUS at 22:06

## 2022-09-11 RX ADMIN — OXYCODONE HYDROCHLORIDE 5 MG: 5 TABLET ORAL at 02:04

## 2022-09-11 RX ADMIN — OXYCODONE HYDROCHLORIDE 5 MG: 5 TABLET ORAL at 09:12

## 2022-09-11 RX ADMIN — INSULIN LISPRO 3 UNITS: 100 INJECTION, SOLUTION INTRAVENOUS; SUBCUTANEOUS at 22:10

## 2022-09-11 RX ADMIN — INSULIN LISPRO 3 UNITS: 100 INJECTION, SOLUTION INTRAVENOUS; SUBCUTANEOUS at 17:17

## 2022-09-11 RX ADMIN — VANCOMYCIN HYDROCHLORIDE 1000 MG: 1 INJECTION, SOLUTION INTRAVENOUS at 09:12

## 2022-09-11 RX ADMIN — HEPARIN SODIUM 5000 UNITS: 5000 INJECTION INTRAVENOUS; SUBCUTANEOUS at 05:19

## 2022-09-11 RX ADMIN — PREDNISONE 40 MG: 20 TABLET ORAL at 09:12

## 2022-09-11 NOTE — OCCUPATIONAL THERAPY NOTE
Occupational Therapy Evaluation     Patient Name: Vic Paula  Today's Date: 9/11/2022  Problem List  Principal Problem:    Tenosynovitis of right hand  Active Problems:    Idiopathic chronic gout of multiple sites with tophus    Hyponatremia    LEX (acute kidney injury) ([address] )    Hyperglycemia    Thrombocytopenia (HCC)    Past Medical History  Past Medical History:   Diagnosis Date    Known health problems: none      Past Surgical History  Past Surgical History:   Procedure Laterality Date    ABDOMINAL SURGERY      COLON SURGERY             09/11/22 0929   OT Last Visit   OT Visit Date 09/11/22   Note Type   Note type Evaluation   Restrictions/Precautions   Weight Bearing Precautions Per Order Yes   RUE Weight Bearing Per Order (S)  NWB   Other Precautions Fall Risk;WBS   Pain Assessment   Pain Assessment Tool 0-10   Pain Score 6   Pain Location/Orientation Orientation: Right  (ring finger)   Pain Radiating Towards '   Hospital Pain Intervention(s) Repositioned; Ambulation/increased activity   Home Living   Type of [address] [place] [address] Two level  (1STE)   Bathroom Shower/Tub Walk-in shower   [facility]  (did not use PTA)   Prior Function   Level of [place] Independent with ADLs and functional mobility   Lives With Alone   Receives Help From Family; Neighbor   ADL Assistance Independent   IADLs Independent   Falls in the last 6 months 0   Vocational On disability   Comments Pt reports son lives down the street   Lifestyle   Autonomy PTA, pt reports being I with ADLs, IADLs, fnxl mobility, (+)    Reciprocal Relationships Son, pt reports very supportive neighbors   Service to Others On disability   Intrinsic Gratification Likes volunteering for [employer]   Psychosocial   Psychosocial (WDL) WDL   ADL   Where Assessed Edge of bed   Eating Assistance 7  Independent   Grooming Assistance 5  Supervision/Setup   UB Bathing Assistance 5  Supervision/Setup   LB Bathing Assistance 5  Supervision/Setup   UB Dressing Assistance 5  Supervision/Setup   LB Dressing Assistance 5  Supervision/Setup   Toileting Assistance  5  Supervision/Setup   Bed Mobility   Supine to Sit 6  Modified independent   Additional items HOB elevated   Sit to Supine 6  Modified independent   Additional items HOB elevated   Transfers   Sit to Stand 7  Independent   Stand to Sit 7  Independent   Additional Comments transfers w/o AD   Functional Mobility   Functional Mobility 5  Supervision   Balance   Static Sitting Good   Dynamic Sitting Fair +   Static Standing Fair +   Dynamic Standing Fair   Ambulatory Fair   Activity Tolerance   Activity Tolerance Patient tolerated treatment well   Nurse Made Aware RN clearance for session   RUE Assessment   RUE Assessment   (deferred 2* NWB, ROM WFL, does have some decreased dexterity)   LUE Assessment   LUE Assessment WFL   Hand Function   Gross Motor Coordination Functional   Fine Motor Coordination Impaired   Sensation   Additional Comments Numbness in R hand   Vision-Basic Assessment   Current Vision Wears glasses all the time   Vision - Complex Assessment   Ocular Range of Motion WFL   Head Position WDL   Tracking Able to track stimulus in all quads without difficulty   Cognition   Overall Cognitive Status [facility]   Arousal/Participation Alert; Responsive; Cooperative   Attention Attends with cues to redirect   Orientation Level Oriented X4   Memory Within functional limits   Following Commands Follows one step commands without difficulty   Comments Pt pleasant and cooperative t/o session, does require increased reponsive time occassionally  14 on BIMs indicating intact cognitive functioning  Assessment   Assessment Pt is a 62 y o  male admitted to [facility] on [facility]/2022 w/ Tenosynovitis of right hand  Pt s/p I&D of R ring and middle finger  Pt with PMH of gout, LEX, hyponatremia  Pt with active OT orders and up and OOB as tolerated orders   As per pt report, pta, resides alone in a 2STH, 1STE  Pt was I w/  ADLS and IADLS, (+) drove  Upon evaluation, pt currently requires MI/I for transfers and S for mobility  Pt currently requires I eating, S grooming, S UB ADLs, S LB ADLs, and S toileting  Pt educated on WBS and precautions - verbalized understanding  Pt reports supportive son and neighbors who can assist as needed  Pt with no further questions or concerns at this time  From OT standpoint, recommendation would be home with increased social support and OPOT (hand and UE) when WBS progresses  No further acute OT needs  D/C OT  Please re-consult if needed  Thank you  Pt was left after session with all current needs met  The patient's raw score on the -PAC Daily Activity inpatient short form is 19, standardized score is 40 22, greater than 39 4  Patients at this level are likely to benefit from discharge to home  Please refer to the recommendation of the Occupational Therapist for safe discharge planning     Plan   OT Frequency Eval only   Recommendation   OT Discharge Recommendation Home with outpatient rehabilitation  (OPOT hand and UE when WBS progresses)   AM-PAC Daily Activity Inpatient   Lower Body Dressing 3   Bathing 3   Toileting 3   Upper Body Dressing 3   Grooming 3   Eating 4   Daily Activity Raw Score 19   Daily Activity Standardized Score (Calc for Raw Score >=11) 40 22   AM-PAC Applied Cognition Inpatient   Following a Speech/Presentation 4   Understanding Ordinary Conversation 4   Taking Medications 4   Remembering Where Things Are Placed or Put Away 4   Remembering List of 4-5 Errands 3   Taking Care of Complicated Tasks 3   Applied Cognition Raw Score 22   Applied Cognition Standardized Score 47 83   Brief Interview for Mental Status (BIMS)   Repetition of Three Words (First Attempt) 3   Temporial Orientation: Year Correct   Temporal Orientation: Month Accurate within 5 days   Temporal Orientation: Day Correct   Recall: "Sock" Yes, after cueing ("something to wear") Recall: "Blue" Yes, no cue required   Recall: "Bed" Yes, no cue required   BIMS Summary Score 14         Leena Degroot MS, OTR/L

## 2022-09-11 NOTE — ASSESSMENT & PLAN NOTE
· Pt presented with right hand tophi gout with swelling and pain   · Ortho following,  · MRI obtained - Destructive change of the distal aspect of the 4th digit middle phalanx and base of the 4th digit distal phalanx with adjacent joint effusion  Trace tenosynovitis of the 4th digit flexor tendon at level of base of 4th digit middle phalanx likely inflammatory   Infectious tenosynovitis is less likely given appearance   · S/p Bactrim as an outpatient without improvement -- wound culture from 8/31 with Staph aureus and operative culture from 9/10 also growing Staph aureus     · Currently on IV Vancomycin (pt notes allergy/intolerance to Keflex -- pt notes joint pain/stiff joints?)   · NWB RUE, s/p debridement/wash out with hand surgery on 9/10  · Start on PO Prednisone for gout treatment, d/c colchicine in setting of LEX  · SED rate and CRP elevated  · ID consultation pending

## 2022-09-11 NOTE — PROGRESS NOTES
[facility]  Progress Note - Evaristo Goldman 1960, 62 y o  male MRN: [id number]  Unit/Bed#: [facility] 623-01 Encounter: [id number]  Primary Care Provider: Chetna Mcelroy MD   Date and time admitted to hospital: 9/8/2022  7:47 PM    * Tenosynovitis of right hand  Assessment & Plan  · Pt presented with right hand tophi gout with swelling and pain   · Ortho following,  · MRI obtained - Destructive change of the distal aspect of the 4th digit middle phalanx and base of the 4th digit distal phalanx with adjacent joint effusion  Trace tenosynovitis of the 4th digit flexor tendon at level of base of 4th digit middle phalanx likely inflammatory  Infectious tenosynovitis is less likely given appearance   · S/p Bactrim as an outpatient without improvement -- wound culture from 8/31 with Staph aureus and operative culture from 9/10 also growing Staph aureus     · Currently on IV Vancomycin (pt notes allergy/intolerance to Keflex -- pt notes joint pain/stiff joints?)   · NWB RUE, s/p debridement/wash out with hand surgery on 9/10  · Start on PO Prednisone for gout treatment, d/c colchicine in setting of LEX  · SED rate and CRP elevated  · ID consultation pending     Thrombocytopenia ([address] )  Assessment & Plan  · POA and trending down  Unclear etiology  He does drink two glasses of wine daily, no known liver problems   · Pt had thrombocytopenia on labs from August of 2020, but appears to likely be related to acute illness requiring surgical resection of bowel at that time, platelet count then normalized   · Trend CBC  · No evidence of active bleeding   · D/c heparin given low platelets (do not think this is cause as POA)  · ? Related to acute illness, surgery   · Hematology evaluation     Controlled type 2 diabetes mellitus, without long-term current use of insulin ([facility])  Assessment & Plan  Hyperglycemia noted on labs   · Hgb A1c is 6 8   · Fingersticks while on steroids   · SSI    · Monitor · Pt aware of need for f/u     LEX (acute kidney injury) ([facility] [address] )  Assessment & Plan  · POA, Cr  2 18  · Per review of outpatient records in care everywhere, most recent Cr noted to be 0 7  · Likely in setting of volume depletion and dehydration from poor PO intake over the last week   · Place on IVF hydration with improvement, Cr today 1 2  · Trend BMP closely   · Avoid NSAIDs, colchicine discontinued   · Avoid hypotension    Hyponatremia  Assessment & Plan  · POA, sodium 129  · Likely in setting of poor PO intake as an outpatient, reports he had poor appetite while on Bactrim outpatient and did not eat much   · Sodium improved with IV fluids   · Resolved     Idiopathic chronic gout of multiple sites with tophus  Assessment & Plan  · Noted on the right hand   · Typically maintained on indomethacin and colchicine, now discontinued in setting of renal function   · Place on PO prednisone 40 mg daily x 5 days for inflammatory treatment   · Additional plan as above         VTE Pharmacologic Prophylaxis: VTE Score: 3 Moderate Risk (Score 3-4) - Pharmacological DVT Prophylaxis Contraindicated  Sequential Compression Devices Ordered  Patient Centered Rounds: I performed bedside rounds with nursing staff today  Discussions with Specialists or Other Care Team Provider: RN, heme/onc     Education and Discussions with Family / Patient: Patient declined call to   Time Spent for Care: 45 minutes  More than 50% of total time spent on counseling and coordination of care as described above  Current Length of Stay: 2 day(s)  Current Patient Status: Inpatient   Certification Statement: The patient will continue to require additional inpatient hospital stay due to IV abx, ortho re-eval, ID eval, heme/onc eal  Discharge Plan: Anticipate discharge in 48-72 hrs to home  Code Status: Level 1 - Full Code    Subjective:   Patient reports to no acute complaints today and feels rather well    Pain is controlled with current medications  He does notice that he bruises easily when he scratches his skin  He does say that his scalp and shoulders are occasionally itchy and that he does open his skin when he scratches  Objective:     Vitals:   Temp (24hrs), Av 5 °F (36 4 °C), Min:97 3 °F (36 3 °C), Max:97 8 °F (36 6 °C)    Temp:  [97 3 °F (36 3 °C)-97 8 °F (36 6 °C)] 97 4 °F (36 3 °C)  HR:  [63-81] 63  Resp:  [16-20] 18  BP: (114-128)/(67-87) 128/87  SpO2:  [98 %-100 %] 99 %  There is no height or weight on file to calculate BMI  Input and Output Summary (last 24 hours): Intake/Output Summary (Last 24 hours) at 2022 1506  Last data filed at 2022 1300  Gross per 24 hour   Intake 930 ml   Output --   Net 930 ml       Physical Exam:   Physical Exam  Constitutional:       General: He is not in acute distress  Appearance: He is not toxic-appearing  HENT:      Head: Normocephalic and atraumatic  Comments: Scabbing/excoriations noted on scalp  Eyes:      Extraocular Movements: Extraocular movements intact  Cardiovascular:      Rate and Rhythm: Normal rate and regular rhythm  Heart sounds: Murmur heard  Pulmonary:      Effort: Pulmonary effort is normal  No respiratory distress  Breath sounds: Normal breath sounds  Abdominal:      General: Bowel sounds are normal  There is no distension  Palpations: Abdomen is soft  Tenderness: There is no abdominal tenderness  Musculoskeletal:      Cervical back: Normal range of motion  Comments: RT hand 4th finger wrapped    Skin:     Findings: Bruising (noted on forearms) present  Neurological:      General: No focal deficit present  Mental Status: He is alert and oriented to person, place, and time  Psychiatric:         Mood and Affect: Mood normal          Behavior: Behavior normal          Thought Content:  Thought content normal          Judgment: Judgment normal           Additional Data:     Labs:  Results from last 7 days   Lab Units 09/11/22  0649   WBC Thousand/uL 5 18   HEMOGLOBIN g/dL 10 0*   HEMATOCRIT % 28 2*   PLATELETS Thousands/uL 46*   NEUTROS PCT % 87*   LYMPHS PCT % 11*   MONOS PCT % 2*   EOS PCT % 0     Results from last 7 days   Lab Units 09/11/22  0649 09/10/22  0600 09/09/22  0431   SODIUM mmol/L 134*   < > 131*   POTASSIUM mmol/L 4 8   < > 4 3   CHLORIDE mmol/L 106   < > 105   CO2 mmol/L 21   < > 21   BUN mg/dL 31*   < > 33*   CREATININE mg/dL 1 20   < > 2 13*   ANION GAP mmol/L 7   < > 5   CALCIUM mg/dL 8 9   < > 8 5   ALBUMIN g/dL  --   --  2 9*   TOTAL BILIRUBIN mg/dL  --   --  0 56   ALK PHOS U/L  --   --  86   ALT U/L  --   --  33   AST U/L  --   --  31   GLUCOSE RANDOM mg/dL 213*   < > 122    < > = values in this interval not displayed  Results from last 7 days   Lab Units 09/09/22  0431   INR  1 13     Results from last 7 days   Lab Units 09/11/22  1139 09/11/22  0742 09/10/22  2051 09/10/22  1724 09/10/22  1139 09/10/22  0731 09/09/22  2133 09/09/22  1759 09/09/22  1201   POC GLUCOSE mg/dl 317* 209* 378* 296* 186* 134 233* 268* 147*     Results from last 7 days   Lab Units 09/09/22  0431   HEMOGLOBIN A1C % 6 8*           Lines/Drains:  Invasive Devices  Report    Peripheral Intravenous Line  Duration           Peripheral IV 09/09/22 Left;Ventral (anterior) Forearm 1 day                      Imaging: No pertinent imaging reviewed      Recent Cultures (last 7 days):   Results from last 7 days   Lab Units 09/10/22  0837 09/10/22  0834   GRAM STAIN RESULT  No Polys or Bacteria seen No Polys or Bacteria seen       Last 24 Hours Medication List:   Current Facility-Administered Medications   Medication Dose Route Frequency Provider Last Rate    acetaminophen  650 mg Oral Q6H PRN Elizabeth Leong MD      aluminum-magnesium hydroxide-simethicone  30 mL Oral Q6H PRN Elizabeth Leong MD      docusate sodium  100 mg Oral BID PRN Elizabeth Leong MD      ondansetron  4 mg Intravenous Q6H PRN Suman Leong MD      oxyCODONE  5 mg Oral Q4H PRN Betsey Lance MD      predniSONE  40 mg Oral Daily Betsey Lance MD      sodium chloride  75 mL/hr Intravenous Continuous Betsey Lance MD Stopped (09/10/22 1449)    vancomycin  12 5 mg/kg Intravenous Q12H Sandrita Meyer MD Stopped (09/11/22 1012)        Today, Patient Was Seen By: Waldo Díaz PA-C    **Please Note: This note may have been constructed using a voice recognition system  **

## 2022-09-11 NOTE — ASSESSMENT & PLAN NOTE
· POA, sodium 129  · Likely in setting of poor PO intake as an outpatient, reports he had poor appetite while on Bactrim outpatient and did not eat much   · Sodium improved with IV fluids   · Resolved

## 2022-09-11 NOTE — CONSULTS
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 S/P I&D day 1 POD  2  Type 2 DM     Recommendations:    Discuss with the primary service  1  Continue vancomycin 1 g q 12 hours IV with further dosing by pharmacy  Would anticipate with osteomyelitis that he might need 28 full days of therapy followed by p o  HISTORY OF PRESENT ILLNESS:    Reason for Consult:  Abscess right ring finger  HPI: Eloy Hemphill is a 62y o  year old male with a history of tophaceous gout who was admitted from the emergency room on 9/8/22 complaining of right index and middle finger swelling which has been going on for months that is worsening  During that time he had been seen as an outpatient in urgent care in his legs and approximately 2 weeks previously and had I&D of the D IP joint of the right ring finger at the dorsal aspect and was placed on Bactrim DS that he continued to take twice daily  He was seen again in Urgent Care on 09/07 referred to General surgery who referred him here for orthopedic hand surgery evaluation  He was placed on vancomycin IV and an MRI was completed 9/9 that showed destructive change of the distal aspect of the 4th middle digit and base of the 4th digit distal phalanx with adjacent joint effusion  There was similar changes at the 2nd and 3rd digit but there was no significant osseous destructive changes at those joints  Cultures reveal Staphylococcus aureus with susceptibilities pending    Yesterday the patient underwent debridement with the finding of gouty tophus of the ring finger with significant bony and ligamentous destruction of the ring finger  Patient is currently on vancomycin 1 g q 12 hours IV with further dosing by pharmacy and is on day 4 Rx  Patient has allergy to cephalexin  Review of Systems positive findings include painful swelling with purulent drainage for right ring finger, gouty arthropathy fingers and toes  A [id number] point system-based review of systems is otherwise negative  PAST MEDICAL HISTORY:  Past Medical History:   Diagnosis Date    Known health problems: none      Past Surgical History:   Procedure Laterality Date    ABDOMINAL SURGERY      COLON SURGERY         FAMILY HISTORY:  Non-contributory    SOCIAL HISTORY:  Social History   Single  Social History     Substance and Sexual Activity   Alcohol Use Yes    Alcohol/week: 2 0 standard drinks    Types: 2 Glasses of wine per week    Comment: social     Social History     Substance and Sexual Activity   Drug Use Never     Social History     Tobacco Use   Smoking Status Never Smoker   Smokeless Tobacco Never Used       ALLERGIES:  Allergies   Allergen Reactions    Cephalexin Edema       MEDICATIONS:  All current active medications have been reviewed        PHYSICAL EXAM:  Temp:  [97 3 °F (36 3 °C)-97 8 °F (36 6 °C)] 97 4 °F (36 3 °C)  HR:  [63-75] 63  Resp:  [16-20] 18  BP: (114-128)/(67-87) 128/87  SpO2:  [99 %-100 %] 99 %  Temp (24hrs), Av 5 °F (36 4 °C), Min:97 3 °F (36 3 °C), Max:97 8 °F (36 6 °C)  Current: Temperature: (!) 97 4 °F (36 3 °C)    Intake/Output Summary (Last 24 hours) at 2022 1539  Last data filed at 2022 1300  Gross per 24 hour   Intake 930 ml   Output --   Net 930 ml       General Appearance:  Appearing well, nontoxic, and in no distress, appears stated age   Head:  Normocephalic, without obvious abnormality, atraumatic   Eyes:  PERRL, conjunctiva pink and sclera anicteric, both eyes   Nose: Nares normal, mucosa normal, no drainage   Throat: Oropharynx moist without lesions; lips, mucosa, and tongue normal; teeth and gums normal   Neck: Supple, symmetrical, trachea midline, no adenopathy, no tenderness/mass/nodules   Back:   Symmetric, no curvature, ROM normal, no CVA tenderness   Lungs:   Clear to auscultation bilaterally, no audible wheezes, rhonchi and rales, respirations unlabored   Chest Wall:  No tenderness or deformity   Heart:  Regular rate and rhythm, S1, S2 normal, no murmur, rub or gallop   Abdomen:   Soft, non-tender, non-distended, positive bowel sounds, no masses, no organomegaly    No CVA tenderness   Extremities: Tophaceous gouty arthropathy of fingers and toes, right 4th finger with dry surgical dressing   Skin: Skin color pale, as above   Neurologic: Alert and oriented times 3, extremity strength 5/5 and symmetric           Invasive Devices:   Peripheral IV 09/09/22 Left;Ventral (anterior) Forearm (Active)   Site Assessment Clean;Dry; Intact 09/11/22 0912   Dressing Type Transparent 09/11/22 0912   Line Status Blood return noted; Flushed;Saline locked 09/11/22 0912   Dressing Status Clean;Dry; Intact 09/11/22 0912   Dressing Change Due 09/13/22 09/11/22 0912   Reason Not Rotated Not due 09/09/22 2341       LABS, IMAGING, & OTHER STUDIES:  Lab Results:      I have personally reviewed pertinent labs      Results from last 7 days   Lab Units 09/11/22 0649 09/10/22  0600 09/09/22  0431 09/08/22  2032   WBC Thousand/uL 5 18 5 85 6 01 7 08   HEMOGLOBIN g/dL 10 0* 10 0* 10 3* 12 5   PLATELETS Thousands/uL 46* 53*  --  109*     Results from last 7 days   Lab Units 09/11/22 0649 09/10/22  0600 09/09/22  0431   SODIUM mmol/L 134* 135 131*   POTASSIUM mmol/L 4 8 4 7 4 3   CHLORIDE mmol/L 106 107 105   CO2 mmol/L 21 21 21   BUN mg/dL 31* 34* 33*   CREATININE mg/dL 1 20 1 38* 2 13*   EGFR ml/min/1 73sq m 65 55 32   CALCIUM mg/dL 8 9 8 8 8 5   AST U/L  --   --  31   ALT U/L  --   --  33   ALK PHOS U/L  --   --  86     Results from last 7 days   Lab Units 09/10/22  0837 09/10/22  0834   GRAM STAIN RESULT  No Polys or Bacteria seen No Polys or Bacteria seen       Imaging Studies:   I have personally reviewed pertinent imaging study reports and images in PACS  EKG, Pathology, and Other Studies:   I have personally reviewed pertinent reports

## 2022-09-11 NOTE — PLAN OF CARE
Problem: PAIN - ADULT  Goal: Verbalizes/displays adequate comfort level or baseline comfort level  Description: Interventions:  - Encourage patient to monitor pain and request assistance  - Assess pain using appropriate pain scale  - Administer analgesics based on type and severity of pain and evaluate response  - Implement non-pharmacological measures as appropriate and evaluate response  - Consider cultural and social influences on pain and pain management  - Notify physician/advanced practitioner if interventions unsuccessful or patient reports new pain  Outcome: Progressing     Problem: INFECTION - ADULT  Goal: Absence or prevention of progression during hospitalization  Description: INTERVENTIONS:  - Assess and monitor for signs and symptoms of infection  - Monitor lab/diagnostic results  - Monitor all insertion sites, i e  indwelling lines, tubes, and drains  - Monitor endotracheal if appropriate and nasal secretions for changes in amount and color  - Anna appropriate cooling/warming therapies per order  - Administer medications as ordered  - Instruct and encourage patient and family to use good hand hygiene technique  - Identify and instruct in appropriate isolation precautions for identified infection/condition  Outcome: Progressing     Problem: SAFETY ADULT  Goal: Patient will remain free of falls  Description: INTERVENTIONS:  - Educate patient/family on patient safety including physical limitations  - Instruct patient to call for assistance with activity   - Consult OT/PT to assist with strengthening/mobility   - Keep Call bell within reach  - Keep bed low and locked with side rails adjusted as appropriate  - Keep care items and personal belongings within reach  - Initiate and maintain comfort rounds  - Make Fall Risk Sign visible to staff  - Apply yellow socks and bracelet for high fall risk patients  - Consider moving patient to room near nurses station  Outcome: Progressing  Goal: Maintain or return to baseline ADL function  Description: INTERVENTIONS:  -  Assess patient's ability to carry out ADLs; assess patient's baseline for ADL function and identify physical deficits which impact ability to perform ADLs (bathing, care of mouth/teeth, toileting, grooming, dressing, etc )  - Assess/evaluate cause of self-care deficits   - Assess range of motion  - Assess patient's mobility; develop plan if impaired  - Assess patient's need for assistive devices and provide as appropriate  - Encourage maximum independence but intervene and supervise when necessary  - Involve family in performance of ADLs  - Assess for home care needs following discharge   - Consider OT consult to assist with ADL evaluation and planning for discharge  - Provide patient education as appropriate  Outcome: Progressing  Goal: Maintains/Returns to pre admission functional level  Description: INTERVENTIONS:  - Perform BMAT or MOVE assessment daily    - Set and communicate daily mobility goal to care team and patient/family/caregiver     - Collaborate with rehabilitation services on mobility goals if consulted  - Out of bed for toileting  - Record patient progress and toleration of activity level   Outcome: Progressing     Problem: DISCHARGE PLANNING  Goal: Discharge to home or other facility with appropriate resources  Description: INTERVENTIONS:  - Identify barriers to discharge w/patient and caregiver  - Arrange for needed discharge resources and transportation as appropriate  - Identify discharge learning needs (meds, wound care, etc )  - Arrange for interpretive services to assist at discharge as needed  - Refer to Case Management Department for coordinating discharge planning if the patient needs post-hospital services based on physician/advanced practitioner order or complex needs related to functional status, cognitive ability, or social support system  Outcome: Progressing     Problem: Knowledge Deficit  Goal: Patient/family/caregiver demonstrates understanding of disease process, treatment plan, medications, and discharge instructions  Description: Complete learning assessment and assess knowledge base    Interventions:  - Provide teaching at level of understanding  - Provide teaching via preferred learning methods  Outcome: Progressing     Problem: HEMATOLOGIC - ADULT  Goal: Maintains hematologic stability  Description: INTERVENTIONS  - Assess for signs and symptoms of bleeding or hemorrhage  - Monitor labs  - Administer supportive blood products/factors as ordered and appropriate  Outcome: Progressing     Problem: MUSCULOSKELETAL - ADULT  Goal: Maintain or return mobility to safest level of function  Description: INTERVENTIONS:  - Assess patient's ability to carry out ADLs; assess patient's baseline for ADL function and identify physical deficits which impact ability to perform ADLs (bathing, care of mouth/teeth, toileting, grooming, dressing, etc )  - Assess/evaluate cause of self-care deficits   - Assess range of motion  - Assess patient's mobility  - Assess patient's need for assistive devices and provide as appropriate  - Encourage maximum independence but intervene and supervise when necessary  - Involve family in performance of ADLs  - Assess for home care needs following discharge   - Consider OT consult to assist with ADL evaluation and planning for discharge  - Provide patient education as appropriate  Outcome: Progressing  Goal: Maintain proper alignment of affected body part  Description: INTERVENTIONS:  - Support, maintain and protect limb and body alignment  - Provide patient/ family with appropriate education  Outcome: Progressing     Problem: Potential for Falls  Goal: Patient will remain free of falls  Description: INTERVENTIONS:  - Educate patient/family on patient safety including physical limitations  - Instruct patient to call for assistance with activity   - Consult OT/PT to assist with strengthening/mobility   - Keep Call bell within reach  - Keep bed low and locked with side rails adjusted as appropriate  - Keep care items and personal belongings within reach  - Initiate and maintain comfort rounds  - Make Fall Risk Sign visible to staff  - Apply yellow socks and bracelet for high fall risk patients  - Consider moving patient to room near nurses station  Outcome: Progressing

## 2022-09-11 NOTE — ASSESSMENT & PLAN NOTE
· POA, Cr  2 18  · Per review of outpatient records in care everywhere, most recent Cr noted to be 0 7  · Likely in setting of volume depletion and dehydration from poor PO intake over the last week   · Place on IVF hydration with improvement, Cr today 1 2  · Trend BMP closely   · Avoid NSAIDs, colchicine discontinued   · Avoid hypotension

## 2022-09-11 NOTE — CONSULTS
Oncology Consult Note  Eloy Hemphill 62 y o  male MRN: [id number]  Unit/Bed#: [facility] 623-01 Encounter: [id number]      Presenting Complaint: thrombocytopenia     History of Presenting Illness: 61 yo patient presents for joint abscess with staph infection  He was treated with bactrim as an outpatient which caused decreased appetite and 11 lb weight loss  He has never seen a rheumatologist for his gout  He intermittently takes colchicine, steroids and indomethecin  He takes colchicine at the most 4 pills per month when  He has a flare  We were consulted for low plts with most recent count of 46  When he arrived his plts were 109  One of the counts this admission was clumped  I also noticed that he has a macrocytosis of 118  He had an extensive right hemicolectomy in 2020 at [place] with ileostomy for a 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 diverticulitis  He has never received any b12 replacement  He has never seen a hematologist in the past   He denies any bleeding or bruising  He lives alone  His son lives 4 doors down  He doesn't smoke  He drinks 2 glasses of wine per day  He doesn't have any dietary restrictions  Review of Systems - As stated in the HPI otherwise the fourteen point review of systems was negative  ECOG PS: 0    Past Medical History:   Diagnosis Date    Known health problems: none        Social History     Socioeconomic History    Marital status: Single     Spouse name: None    Number of children: None    Years of education: None    Highest education level: None   Occupational History    None   Tobacco Use    Smoking status: Never Smoker    Smokeless tobacco: Never Used   Vaping Use    Vaping Use: Never used   Substance and Sexual Activity    Alcohol use:  Yes     Alcohol/week: 2 0 standard drinks     Types: 2 Glasses of wine per week     Comment: social    Drug use: Never    Sexual activity: Not Currently   Other Topics Concern    None   Social History Narrative  None     Social Determinants of Health     Financial Resource Strain: Not on file   Food Insecurity: Not on file   Transportation Needs: Not on file   Physical Activity: Not on file   Stress: Not on file   Social Connections: Not on file   Intimate Partner Violence: Not on file   Housing Stability: Not on file       History reviewed  No pertinent family history      Allergies   Allergen Reactions    Cephalexin Edema         Current Facility-Administered Medications:     acetaminophen (TYLENOL) tablet 650 mg, 650 mg, Oral, Q6H PRN, Ad Leong MD, 650 mg at 09/09/22 1814    aluminum-magnesium hydroxide-simethicone (MYLANTA) oral suspension 30 mL, 30 mL, Oral, Q6H PRN, Mckenzie Valencia MD    docusate sodium (COLACE) capsule 100 mg, 100 mg, Oral, BID PRN, Ad Leong MD    insulin lispro (HumaLOG) 100 units/mL subcutaneous injection 1-5 Units, 1-5 Units, Subcutaneous, TID AC **AND** Fingerstick Glucose (POCT), , , TID AC, Abby Rollins PA-C    insulin lispro (HumaLOG) 100 units/mL subcutaneous injection 1-5 Units, 1-5 Units, Subcutaneous, HS, Abby Rollins PA-C    ondansetron (ZOFRAN) injection 4 mg, 4 mg, Intravenous, Q6H PRN, Ad Leong MD    oxyCODONE (ROXICODONE) IR tablet 5 mg, 5 mg, Oral, Q4H PRN, Ad Leong MD, 5 mg at 09/11/22 0912    predniSONE tablet 40 mg, 40 mg, Oral, Daily, Ad Leong MD, 40 mg at 09/11/22 0912    sodium chloride 0 9 % infusion, 75 mL/hr, Intravenous, Continuous, Mckenzie Valecnia MD, Stopped at 09/10/22 1449    vancomycin (VANCOCIN) IVPB (premix in dextrose) 1,000 mg 200 mL, 12 5 mg/kg, Intravenous, Q12H, Adela Jackman MD, Stopped at 09/11/22 1012      /85   Pulse 65   Temp 97 8 °F (36 6 °C)   Resp 18   SpO2 97%     General Appearance:    Alert, oriented        Eyes:    PERRL   Ears:    Normal external ear canals, both ears   Nose:   Nares normal, septum midline   Throat:   Mucosa moist  Pharynx without injection  Neck:   Supple       Lungs:     Clear to auscultation bilaterally   Chest Wall:    No tenderness or deformity    Heart:    Regular rate and rhythm       Abdomen:     Soft, non-tender, bowel sounds +, no organomegaly           Extremities:   Extremities no cyanosis or edema       Skin:   no rash or icterus      Lymph nodes:   Cervical, supraclavicular, and axillary nodes normal   Neurologic:   CNII-XII intact, normal strength, sensation and reflexes     Throughout               Recent Results (from the past 48 hour(s))   Fingerstick Glucose (POCT)    Collection Time: 09/09/22  5:59 PM   Result Value Ref Range    POC Glucose 268 (H) 65 - 140 mg/dl   Fingerstick Glucose (POCT)    Collection Time: 09/09/22  9:33 PM   Result Value Ref Range    POC Glucose 233 (H) 65 - 140 mg/dl   Vancomycin, random    Collection Time: 09/09/22  9:52 PM   Result Value Ref Range    Vancomycin Rm 14 9 10 0 - 20 0 ug/mL   Basic metabolic panel    Collection Time: 09/10/22  6:00 AM   Result Value Ref Range    Sodium 135 135 - 147 mmol/L    Potassium 4 7 3 5 - 5 3 mmol/L    Chloride 107 96 - 108 mmol/L    CO2 21 21 - 32 mmol/L    ANION GAP 7 4 - 13 mmol/L    BUN 34 (H) 5 - 25 mg/dL    Creatinine 1 38 (H) 0 60 - 1 30 mg/dL    Glucose 144 (H) 65 - 140 mg/dL    Calcium 8 8 8 3 - 10 1 mg/dL    eGFR 55 ml/min/1 73sq m   CBC    Collection Time: 09/10/22  6:00 AM   Result Value Ref Range    WBC 5 85 4 31 - 10 16 Thousand/uL    RBC 2 40 (L) 3 88 - 5 62 Million/uL    Hemoglobin 10 0 (L) 12 0 - 17 0 g/dL    Hematocrit 29 1 (L) 36 5 - 49 3 %     (H) 82 - 98 fL    MCH 41 7 (H) 26 8 - 34 3 pg    MCHC 34 4 31 4 - 37 4 g/dL    RDW 12 6 11 6 - 15 1 %    Platelets 53 (L) 666 - 390 Thousands/uL    MPV 10 3 8 9 - 12 7 fL   Fingerstick Glucose (POCT)    Collection Time: 09/10/22  7:31 AM   Result Value Ref Range    POC Glucose 134 65 - 140 mg/dl   Anaerobic culture and Gram stain    Collection Time: 09/10/22  8:34 AM    Specimen: Bone; Tissue Result Value Ref Range    Anaerobic Culture Culture results to follow  Culture, tissue and Gram stain    Collection Time: 09/10/22  8:34 AM    Specimen: Bone; Tissue   Result Value Ref Range    Tissue Culture Culture too young- will reincubate     Gram Stain Result No Polys or Bacteria seen    Anaerobic culture and Gram stain    Collection Time: 09/10/22  8:37 AM    Specimen: Soft Tissue, Debridement   Result Value Ref Range    Anaerobic Culture Culture results to follow      Culture, tissue and Gram stain    Collection Time: 09/10/22  8:37 AM    Specimen: Soft Tissue, Debridement   Result Value Ref Range    Tissue Culture Few Colonies of Staphylococcus aureus (A)     Gram Stain Result No Polys or Bacteria seen    Fingerstick Glucose (POCT)    Collection Time: 09/10/22 11:39 AM   Result Value Ref Range    POC Glucose 186 (H) 65 - 140 mg/dl   Fingerstick Glucose (POCT)    Collection Time: 09/10/22  5:24 PM   Result Value Ref Range    POC Glucose 296 (H) 65 - 140 mg/dl   Fingerstick Glucose (POCT)    Collection Time: 09/10/22  8:51 PM   Result Value Ref Range    POC Glucose 378 (H) 65 - 140 mg/dl   Vancomycin, random    Collection Time: 09/11/22  6:49 AM   Result Value Ref Range    Vancomycin Rm 11 1 10 0 - 20 0 ug/mL   CBC and differential    Collection Time: 09/11/22  6:49 AM   Result Value Ref Range    WBC 5 18 4 31 - 10 16 Thousand/uL    RBC 2 40 (L) 3 88 - 5 62 Million/uL    Hemoglobin 10 0 (L) 12 0 - 17 0 g/dL    Hematocrit 28 2 (L) 36 5 - 49 3 %     (H) 82 - 98 fL    MCH 41 7 (H) 26 8 - 34 3 pg    MCHC 35 5 31 4 - 37 4 g/dL    RDW 12 4 11 6 - 15 1 %    MPV 10 6 8 9 - 12 7 fL    Platelets 46 (LL) 002 - 390 Thousands/uL    nRBC 0 /100 WBCs    Neutrophils Relative 87 (H) 43 - 75 %    Immat GRANS % 0 0 - 2 %    Lymphocytes Relative 11 (L) 14 - 44 %    Monocytes Relative 2 (L) 4 - 12 %    Eosinophils Relative 0 0 - 6 %    Basophils Relative 0 0 - 1 %    Neutrophils Absolute 4 50 1 85 - 7 62 Thousands/µL Immature Grans Absolute 0 02 0 00 - 0 20 Thousand/uL    Lymphocytes Absolute 0 57 (L) 0 60 - 4 47 Thousands/µL    Monocytes Absolute 0 08 (L) 0 17 - 1 22 Thousand/µL    Eosinophils Absolute 0 00 0 00 - 0 61 Thousand/µL    Basophils Absolute 0 01 0 00 - 0 10 Thousands/µL   Basic metabolic panel    Collection Time: 09/11/22  6:49 AM   Result Value Ref Range    Sodium 134 (L) 135 - 147 mmol/L    Potassium 4 8 3 5 - 5 3 mmol/L    Chloride 106 96 - 108 mmol/L    CO2 21 21 - 32 mmol/L    ANION GAP 7 4 - 13 mmol/L    BUN 31 (H) 5 - 25 mg/dL    Creatinine 1 20 0 60 - 1 30 mg/dL    Glucose 213 (H) 65 - 140 mg/dL    Calcium 8 9 8 3 - 10 1 mg/dL    eGFR 65 ml/min/1 73sq m   Fingerstick Glucose (POCT)    Collection Time: 09/11/22  7:42 AM   Result Value Ref Range    POC Glucose 209 (H) 65 - 140 mg/dl   Fingerstick Glucose (POCT)    Collection Time: 09/11/22 11:39 AM   Result Value Ref Range    POC Glucose 317 (H) 65 - 140 mg/dl         XR chest portable    Result Date: 9/9/2022  Narrative: CHEST INDICATION:   Pre Op  COMPARISON:  None EXAM PERFORMED/VIEWS:  XR CHEST PORTABLE FINDINGS: Cardiomediastinal silhouette appears unremarkable  The lungs are clear  No pneumothorax or pleural effusion  Osseous structures appear within normal limits for patient age  Impression: No acute cardiopulmonary disease  Workstation performed: [id number]     X-ray hand right 3+ views    Result Date: 9/8/2022  Narrative: RIGHT HAND INDICATION:  L72 3: Sebaceous cyst  COMPARISON:  None VIEWS:  XR HAND 3+ VW RIGHT Images: 4 For the purposes of institution wide universal language the following terms will apply: (thumb=1st digit/finger, index finger=2nd digit/finger, long finger=3rd digit/finger, ring=4th digit/finger and small finger=5th digit/finger) FINDINGS: There is osteolytic/erosive changes involving the base of the 4th distal phalanx and head of the 4th middle phalanx  No other focal erosive changes are seen   There is no acute fracture or dislocation  Soft tissue swelling most notably about the 3rd PIP, 4th DIP and 2nd PIP joints  No soft tissue gas  Vascular calcifications  Impression: Soft tissue swelling 2nd through 4th digits as above with osteolytic/erosive changes about the 4th DIP joint  Differential considerations regarding the 4th digit include gouty arthropathy, rheumatoid arthritis as well as infection (osteomyelitis)  Clinical correlation is necessary  The study was marked in [facility] for immediate notification  Workstation performed: [id number]     MRI hand right w wo contrast    Result Date: 9/9/2022  Narrative: MRI RIGHT HAND WITH AND WITHOUT CONTRAST INDICATION:   4th digit swelling, concern for osteomyelitis  COMPARISON:  Right hand radiograph 9/8/2022 TECHNIQUE: MR was obtained of the right hand  Precontrast: Sagittal STIR, Axial T2 fat sat, Coronal T1, Coronal T2 fat sat, Axial T1, Coronal 3D SPGR, and axial T1 fat sat sequences were obtained  Postcontrast:   Postcontrast: axial T1 fat sat, and coronal T1 fat sat  IV Contrast:  7 mL of Gadobutrol injection (SINGLE-DOSE) FINDINGS: SUBCUTANEOUS TISSUES: Normal FLEXOR/EXTENSOR TENDONS: Trace fluid about the 4th digit flexor tendon at the level of the base of the 4th digit middle phalanx  MUSCULATURE: Normal  ARTICULAR SURFACES:  There are degenerative changes scattered throughout the DIP and PIP joints with early erosive change and joint effusion is present at the 2nd and 3rd digit PIP joints  There is disproportionate destructive change at the 4th digit DIP joint with adjacent effusion  BONES: There is destructive change with increased T2 and subtle low T1 signal within the distal aspect of the 4th digit middle phalanx and the proximal aspect of the 4th digit distal phalanx  No significant enhancement on postcontrast images  The remainder the osseous structures demonstrate normal marrow signal  SOFT TISSUES: Normal      Impression: 1    Destructive change of the distal aspect 4th digit middle phalanx and base of the 4th digit distal phalanx with adjacent joint effusion  Similar appearing joint effusions at the 2nd and 3rd digit PIP joints however there is is not significant osseous  destructive change at these joints  Differential considerations include inflammatory arthropathy although septic arthritis at the 4th digit DIP joint cannot be excluded on this exam   Clinical and laboratory correlation is recommended and joint aspiration should be considered  2   Trace tenosynovitis of the 4th digit flexor tendon at the level of the base of the 4th digit middle phalanx likely inflammatory  Fluid does not extend proximally along the tendon  Infectious tenosynovitis is less likely given the appearance  The study was marked in [employer] for significant notification  Workstation performed: [id number]       Assessment and Plan: 63 yo male with a macrocytic anemia and tcp  This has acutely worsening during the infection, but does warrant a workup  If the absorption in the patient's terminal ileum was affected by the bowel resection, this would be about the time he would get b12 deficient  I will also check FA, iron studies, cbc in sodium citrate tube to see if platelet clumping could be contributing  I will also check HIV, hepatitis panel  SPEP and SFLC  In addition an US of the abdomen to look for liver problems  Colchicine can also cause cytopenias, but I don't think he was taking it consistently enough for this to be the cause  He may want to get established with a rheumatologist for his gout  Will follow with you  I spent 30 minutes on chart review, direct face to face counseling, coordination of care and documentation

## 2022-09-11 NOTE — ASSESSMENT & PLAN NOTE
Hyperglycemia noted on labs   · Hgb A1c is 6 8   · Fingersticks while on steroids   · SSI    · Monitor   · Pt aware of need for f/u

## 2022-09-11 NOTE — PROGRESS NOTES
Orthopedics   [place] Aroldo 62 y o  male MRN: [id number]  Unit/Bed#: [facility] 623-01      Subjective:  62 y o male post operative day 1 right RF I&D  Doing well  Pain controlled  Labs:  0   Lab Value Date/Time    HCT 29 1 (L) 09/10/2022 0600    HCT 29 4 (L) 09/09/2022 0431    HCT 36 1 (L) 09/08/2022 2032    HGB 10 0 (L) 09/10/2022 0600    HGB 10 3 (L) 09/09/2022 0431    HGB 12 5 09/08/2022 2032    INR 1 13 09/09/2022 0431    WBC 5 85 09/10/2022 0600    WBC 6 01 09/09/2022 0431    WBC 7 08 09/08/2022 2032    ESR 57 (H) 09/08/2022 2032    CRP 39 1 (H) 09/08/2022 2032       Meds:    Current Facility-Administered Medications:     acetaminophen (TYLENOL) tablet 650 mg, 650 mg, Oral, Q6H PRN, Jaylin Leong MD, 650 mg at 09/09/22 1814    aluminum-magnesium hydroxide-simethicone (MYLANTA) oral suspension 30 mL, 30 mL, Oral, Q6H PRN, Jaylin Leong MD    docusate sodium (COLACE) capsule 100 mg, 100 mg, Oral, BID PRN, Jaylin Leong MD    heparin (porcine) subcutaneous injection 5,000 Units, 5,000 Units, Subcutaneous, Q8H [facility], Mario Moreira MD, 5,000 Units at 09/11/22 0519    ondansetron (ZOFRAN) injection 4 mg, 4 mg, Intravenous, Q6H PRN, Mario Moreira MD    oxyCODONE (ROXICODONE) IR tablet 5 mg, 5 mg, Oral, Q4H PRN, Jaylin Leong MD, 5 mg at 09/11/22 0204    predniSONE tablet 40 mg, 40 mg, Oral, Daily, Jaylin Leong MD, 40 mg at 09/10/22 1022    sodium chloride 0 9 % infusion, 75 mL/hr, Intravenous, Continuous, Mario Moreira MD, Stopped at 09/10/22 1449    vancomycin (VANCOCIN) IVPB (premix in dextrose) 1,000 mg 200 mL, 15 mg/kg, Intravenous, Daily PRN, Jaylin Leong MD    Blood Culture:   No results found for: BLOODCX    Wound Culture:   Lab Results   Component Value Date    WOUNDCULT 3+ Growth of Staphylococcus aureus (A) 08/29/2022       Ins and Outs:  I/O last 24 hours:   In: 908 8 [I V :908 8]  Out: -           Physical Exam:  Vitals:    09/11/22 0344 BP: 119/69   Pulse: 75   Resp:    Temp:    SpO2: 100%     right Upper Extremity extremity:  · Dressings C/D/I  · Swelling of the long and ring fingers  ·  No surrounding erythema   · Hand soft and compressible  · tender over the PIPJ of ring and long fingers, not tender on the flexor tendon  · Able to flex extend at the PIPJ  · Motor and Sensation intact to light touch median ulnar and radial nerves  ·   Assessment: 61 y o male post operative day 1 right ring and long finger I&D    Doing well    Plan:  · Antibiotics per ID  · DVT prophylaxis with SCDs  · Analgesics  · PT/OT  · Dispo: Ortho will follow  · Will continue to assess for acute blood loss anemia    Shukri Mackenzie MD

## 2022-09-11 NOTE — PHYSICAL THERAPY NOTE
Physical Therapy Screen    Patient Name: Angie Zepeda    XFGPC'Y Date: 9/11/2022     Problem List  Principal Problem:    Tenosynovitis of right hand  Active Problems:    Idiopathic chronic gout of multiple sites with tophus    Hyponatremia    LEX (acute kidney injury) ([address] )    Hyperglycemia    Thrombocytopenia (HCC)       Past Medical History  Past Medical History:   Diagnosis Date    Known health problems: none         Past Surgical History  Past Surgical History:   Procedure Laterality Date    ABDOMINAL SURGERY      COLON SURGERY           09/11/22 0951   PT Last Visit   PT Visit Date 09/11/22   Note Type   Note type Screen   Orders received and chart reviewed  Presents with primary dx of tenosynovitis of R hand  S/p debridement of ring and long finger  NWB R hand orders  Pt seen ambulating in hallway with no AD and supervision  Pt denies any concerns regarding his current functional mobility or ability to return home safely at this time  Recommend pt continues to mobilize with restorative during hospital stay  Will d/c from caseload       Pita Orourke, PT, DPT

## 2022-09-11 NOTE — PROGRESS NOTES
Vancomycin Pharmacy Consult     Anjana Sena is an 62 y o  male who is currently receiving IV vancomycin for abscess/tenosynovitis of right hand  Vancomycin Assessment:  1  ID Consult: No  2  Cultures:   ·  Wound Hand: 3+ MSSA  · 9/10 Culture Bone: NGTD  · 9/10 Anaerobic Bone: in process  · 9/10 Culture Hand: NGTD  · 9/10 Anaerobic Hand: in process  3  Procalcitonin:   · n/a  4  Renal Function: LEX - improving (unknown baseline)  · SCr: 1 20  · CrCl: 58 mL/min  · UOP: n/a  5  Days of Therapy: 3  6  Current Dose: 1000 mg IV prn level <20  7  Last Level: 11 1 (random  at 0649)  8  Goal AUC(24h): 400-600  9  Goal Random/Trough: 15-20     Vancomycin Plan:  1  Evaluation: LEX improving, will start scheduled regimen based on population kinetics  2  New Dosin mg IV q12h (to start today at 0830)  · Predicted Trough / AUC(24h): 22 6   3  Next Level: trough  at 0800        Pharmacy will continue to follow closely for s/sx of nephrotoxicity, infusion reactions, and appropriateness of therapy  BMP and CBC will be ordered per protocol  We will continue to follow the patients culture results and clinical progress daily  Luis Enrique Stacy, PharmD  Internal Medicine Clinical Pharmacist Specialist  [phone]  [facility]

## 2022-09-11 NOTE — ASSESSMENT & PLAN NOTE
· POA and trending down  Unclear etiology  He does drink two glasses of wine daily, no known liver problems   · Pt had thrombocytopenia on labs from August of 2020, but appears to likely be related to acute illness requiring surgical resection of bowel at that time, platelet count then normalized   · Trend CBC  · No evidence of active bleeding   · D/c heparin given low platelets (do not think this is cause as POA)  · ? Related to acute illness, surgery   · Hematology evaluation

## 2022-09-12 PROBLEM — R01.1 MURMUR: Status: ACTIVE | Noted: 2022-09-12

## 2022-09-12 LAB
ALBUMIN SERPL BCP-MCNC: 2.5 G/DL (ref 3.5–5)
ALP SERPL-CCNC: 60 U/L (ref 46–116)
ALT SERPL W P-5'-P-CCNC: 35 U/L (ref 12–78)
ANION GAP SERPL CALCULATED.3IONS-SCNC: 5 MMOL/L (ref 4–13)
AST SERPL W P-5'-P-CCNC: 39 U/L (ref 5–45)
BACTERIA SPEC ANAEROBE CULT: NORMAL
BACTERIA SPEC ANAEROBE CULT: NORMAL
BACTERIA TISS AEROBE CULT: ABNORMAL
BACTERIA TISS AEROBE CULT: ABNORMAL
BASOPHILS # BLD AUTO: 0 THOUSANDS/ΜL (ref 0–0.1)
BASOPHILS NFR BLD AUTO: 0 % (ref 0–1)
BILIRUB SERPL-MCNC: 0.57 MG/DL (ref 0.2–1)
BUN SERPL-MCNC: 28 MG/DL (ref 5–25)
CALCIUM ALBUM COR SERPL-MCNC: 10.1 MG/DL (ref 8.3–10.1)
CALCIUM SERPL-MCNC: 8.9 MG/DL (ref 8.3–10.1)
CHLORIDE SERPL-SCNC: 106 MMOL/L (ref 96–108)
CO2 SERPL-SCNC: 23 MMOL/L (ref 21–32)
CREAT SERPL-MCNC: 0.98 MG/DL (ref 0.6–1.3)
EOSINOPHIL # BLD AUTO: 0 THOUSAND/ΜL (ref 0–0.61)
EOSINOPHIL NFR BLD AUTO: 0 % (ref 0–6)
ERYTHROCYTE [DISTWIDTH] IN BLOOD BY AUTOMATED COUNT: 12 % (ref 11.6–15.1)
GFR SERPL CREATININE-BSD FRML MDRD: 83 ML/MIN/1.73SQ M
GLUCOSE SERPL-MCNC: 166 MG/DL (ref 65–140)
GLUCOSE SERPL-MCNC: 173 MG/DL (ref 65–140)
GLUCOSE SERPL-MCNC: 258 MG/DL (ref 65–140)
GLUCOSE SERPL-MCNC: 262 MG/DL (ref 65–140)
GLUCOSE SERPL-MCNC: 338 MG/DL (ref 65–140)
GLUCOSE SERPL-MCNC: 369 MG/DL (ref 65–140)
GRAM STN SPEC: ABNORMAL
HCT VFR BLD AUTO: 26.9 % (ref 36.5–49.3)
HGB BLD-MCNC: 9.6 G/DL (ref 12–17)
HIV 1+2 AB+HIV1 P24 AG SERPL QL IA: NORMAL
IMM GRANULOCYTES # BLD AUTO: 0.05 THOUSAND/UL (ref 0–0.2)
IMM GRANULOCYTES NFR BLD AUTO: 1 % (ref 0–2)
LYMPHOCYTES # BLD AUTO: 0.65 THOUSANDS/ΜL (ref 0.6–4.47)
LYMPHOCYTES NFR BLD AUTO: 12 % (ref 14–44)
MCH RBC QN AUTO: 42.5 PG (ref 26.8–34.3)
MCHC RBC AUTO-ENTMCNC: 35.7 G/DL (ref 31.4–37.4)
MCV RBC AUTO: 119 FL (ref 82–98)
MONOCYTES # BLD AUTO: 0.14 THOUSAND/ΜL (ref 0.17–1.22)
MONOCYTES NFR BLD AUTO: 3 % (ref 4–12)
NEUTROPHILS # BLD AUTO: 4.49 THOUSANDS/ΜL (ref 1.85–7.62)
NEUTS SEG NFR BLD AUTO: 84 % (ref 43–75)
NRBC BLD AUTO-RTO: 0 /100 WBCS
PLATELET # BLD AUTO: 43 THOUSANDS/UL (ref 149–390)
PMV BLD AUTO: 10.9 FL (ref 8.9–12.7)
POTASSIUM SERPL-SCNC: 4.6 MMOL/L (ref 3.5–5.3)
PROT SERPL-MCNC: 6.2 G/DL (ref 6.4–8.4)
RBC # BLD AUTO: 2.26 MILLION/UL (ref 3.88–5.62)
SODIUM SERPL-SCNC: 134 MMOL/L (ref 135–147)
WBC # BLD AUTO: 5.33 THOUSAND/UL (ref 4.31–10.16)

## 2022-09-12 PROCEDURE — 99232 SBSQ HOSP IP/OBS MODERATE 35: CPT | Performed by: INTERNAL MEDICINE

## 2022-09-12 PROCEDURE — 85025 COMPLETE CBC W/AUTO DIFF WBC: CPT | Performed by: PHYSICIAN ASSISTANT

## 2022-09-12 PROCEDURE — 99233 SBSQ HOSP IP/OBS HIGH 50: CPT | Performed by: INTERNAL MEDICINE

## 2022-09-12 PROCEDURE — 80053 COMPREHEN METABOLIC PANEL: CPT | Performed by: PHYSICIAN ASSISTANT

## 2022-09-12 PROCEDURE — NC001 PR NO CHARGE: Performed by: ORTHOPAEDIC SURGERY

## 2022-09-12 PROCEDURE — 99232 SBSQ HOSP IP/OBS MODERATE 35: CPT | Performed by: PHYSICIAN ASSISTANT

## 2022-09-12 PROCEDURE — 82948 REAGENT STRIP/BLOOD GLUCOSE: CPT

## 2022-09-12 RX ORDER — CYANOCOBALAMIN 1000 UG/ML
1000 INJECTION, SOLUTION INTRAMUSCULAR; SUBCUTANEOUS DAILY
Status: DISCONTINUED | OUTPATIENT
Start: 2022-09-12 | End: 2022-09-12

## 2022-09-12 RX ORDER — FOLIC ACID 1 MG/1
1 TABLET ORAL DAILY
Status: DISCONTINUED | OUTPATIENT
Start: 2022-09-12 | End: 2022-09-16 | Stop reason: HOSPADM

## 2022-09-12 RX ORDER — CYANOCOBALAMIN 1000 UG/ML
1000 INJECTION, SOLUTION INTRAMUSCULAR; SUBCUTANEOUS DAILY
Status: COMPLETED | OUTPATIENT
Start: 2022-09-12 | End: 2022-09-14

## 2022-09-12 RX ADMIN — INSULIN LISPRO 2 UNITS: 100 INJECTION, SOLUTION INTRAVENOUS; SUBCUTANEOUS at 18:03

## 2022-09-12 RX ADMIN — FOLIC ACID 1 MG: 1 TABLET ORAL at 16:26

## 2022-09-12 RX ADMIN — INSULIN LISPRO 3 UNITS: 100 INJECTION, SOLUTION INTRAVENOUS; SUBCUTANEOUS at 21:50

## 2022-09-12 RX ADMIN — VANCOMYCIN HYDROCHLORIDE 1000 MG: 1 INJECTION, SOLUTION INTRAVENOUS at 09:02

## 2022-09-12 RX ADMIN — INSULIN LISPRO 3 UNITS: 100 INJECTION, SOLUTION INTRAVENOUS; SUBCUTANEOUS at 14:04

## 2022-09-12 RX ADMIN — CYANOCOBALAMIN 1000 MCG: 1000 INJECTION, SOLUTION INTRAMUSCULAR; SUBCUTANEOUS at 16:38

## 2022-09-12 RX ADMIN — INSULIN LISPRO 1 UNITS: 100 INJECTION, SOLUTION INTRAVENOUS; SUBCUTANEOUS at 08:54

## 2022-09-12 RX ADMIN — OXYCODONE HYDROCHLORIDE 5 MG: 5 TABLET ORAL at 08:53

## 2022-09-12 RX ADMIN — VANCOMYCIN HYDROCHLORIDE 1000 MG: 1 INJECTION, SOLUTION INTRAVENOUS at 21:48

## 2022-09-12 RX ADMIN — PREDNISONE 40 MG: 20 TABLET ORAL at 08:52

## 2022-09-12 RX ADMIN — OXYCODONE HYDROCHLORIDE 5 MG: 5 TABLET ORAL at 22:02

## 2022-09-12 NOTE — PROGRESS NOTES
Orthopedics   [facility] 62 [facility] o  male MRN: [id number]  Unit/Bed#: [facility] 623-01      Subjective:  62 y o male post operative day 2 right RF I&D  Doing well  Pain controlled  Labs:  0   Lab Value Date/Time    HCT 29 5 (L) 09/11/2022 1656    HCT 28 2 (L) 09/11/2022 0649    HCT 29 1 (L) 09/10/2022 0600    HGB 10 3 (L) 09/11/2022 1656    HGB 10 0 (L) 09/11/2022 0649    HGB 10 0 (L) 09/10/2022 0600    INR 1 13 09/09/2022 0431    WBC 5 76 09/11/2022 1656    WBC 5 18 09/11/2022 0649    WBC 5 85 09/10/2022 0600    ESR 57 (H) 09/08/2022 2032    CRP 39 1 (H) 09/08/2022 2032       Meds:    Current Facility-Administered Medications:     acetaminophen (TYLENOL) tablet 650 mg, 650 mg, Oral, Q6H PRN, Sergio Leong MD, 650 mg at 09/09/22 1814    aluminum-magnesium hydroxide-simethicone (MYLANTA) oral suspension 30 mL, 30 mL, Oral, Q6H PRN, Rodriguez Olguin MD    docusate sodium (COLACE) capsule 100 mg, 100 mg, Oral, BID PRN, Sergio Leong MD    insulin lispro (HumaLOG) 100 units/mL subcutaneous injection 1-5 Units, 1-5 Units, Subcutaneous, TID AC, 3 Units at 09/11/22 1717 **AND** Fingerstick Glucose (POCT), , , TID AC, Abby Rollins PA-C    insulin lispro (HumaLOG) 100 units/mL subcutaneous injection 1-5 Units, 1-5 Units, Subcutaneous, HS, Abby Rollins PA-C, 3 Units at 09/11/22 2210    ondansetron (ZOFRAN) injection 4 mg, 4 mg, Intravenous, Q6H PRN, Sergio Leong MD    oxyCODONE (ROXICODONE) IR tablet 5 mg, 5 mg, Oral, Q4H PRN, Sergio Leong MD, 5 mg at 09/11/22 0912    predniSONE tablet 40 mg, 40 mg, Oral, Daily, Sergio Leong MD, 40 mg at 09/11/22 0912    sodium chloride 0 9 % infusion, 75 mL/hr, Intravenous, Continuous, Rodriguez Olguin MD, Stopped at 09/10/22 1449    vancomycin (VANCOCIN) IVPB (premix in dextrose) 1,000 mg 200 mL, 12 5 mg/kg, Intravenous, Q12H, Tyler Hammer MD, Last Rate: 200 mL/hr at 09/11/22 2206, 1,000 mg at 09/11/22 2206    Blood Culture:    No results found for: BLOODCX    Wound Culture:   Lab Results   Component Value Date    WOUNDCULT 3+ Growth of Staphylococcus aureus (A) 08/29/2022       Ins and Outs:  I/O last 24 hours: In: 930 [P O :730; IV Piggyback:200]  Out: -           Physical Exam:  Vitals:    09/12/22 0111   BP: 145/79   Pulse: 64   Resp: 20   Temp: 98 2 °F (36 8 °C)   SpO2: 100%     right Upper Extremity extremity:  · Incision C/D/I  · Swelling of the long and ring fingers  ·  No surrounding erythema   · Hand soft and compressible  · tender over the PIPJ of ring and long fingers, not tender on the flexor tendon  · Able to flex extend at the PIPJ  · Motor and Sensation intact to light touch median ulnar and radial nerves  Assessment: 61 y o male post operative day 2  right ring and long finger I&D  cx growing staph aureus    Patient is improved since surgery    Plan:  · Antibiotics per ID - vanco x 28d  · DVT prophylaxis with SCDs  · Analgesics  · PT/OT  · Dispo: Ortho will follow  · Will continue to assess for acute blood loss anemia    Pawan Garcia MD

## 2022-09-12 NOTE — ASSESSMENT & PLAN NOTE
· POA, Cr  2 18  · Per review of outpatient records in care everywhere, most recent Cr noted to be 0 7  · Likely in setting of volume depletion and dehydration from poor PO intake over the last week   · Placed on IVF hydration with improvement, Cr today 0 9  · Trend BMP closely   · Avoid NSAIDs, colchicine discontinued   · Avoid hypotension  · Resolved

## 2022-09-12 NOTE — ASSESSMENT & PLAN NOTE
· POA and trending down  Unclear etiology  He does drink two glasses of wine daily, no known liver problems   · Pt had thrombocytopenia on labs from August of 2020, but appears to likely be related to acute illness requiring surgical resection of bowel at that time, platelet count then normalized   · Trend CBC  · No evidence of active bleeding   · D/c heparin given low platelets (do not think this is cause as POA)  · ? Related to acute illness, surgery   · Hematology evaluation appreciated, ordered additional labs

## 2022-09-12 NOTE — PROGRESS NOTES
[facility]  Progress Note - Tanya Brizuela 1960, 62 y o  male MRN: [id number]  Unit/Bed#: [facility] 623-01 Encounter: [id number]  Primary Care Provider: Rakel Valdes MD   Date and time admitted to hospital: 9/8/2022  7:47 PM    * Tenosynovitis of right hand  Assessment & Plan  · Pt presented with right hand tophi gout with swelling and pain   · Ortho following,  · MRI obtained - Destructive change of the distal aspect of the 4th digit middle phalanx and base of the 4th digit distal phalanx with adjacent joint effusion  Trace tenosynovitis of the 4th digit flexor tendon at level of base of 4th digit middle phalanx likely inflammatory  Infectious tenosynovitis is less likely given appearance   · S/p Bactrim as an outpatient without improvement -- wound culture from 8/31 with Staph aureus and operative culture from 9/10 also growing Staph aureus     · Currently on IV Vancomycin (pt notes allergy/intolerance to Keflex -- reports joint pain/stiff joints?)   · NWB RUE, s/p debridement/wash out with hand surgery on 9/10  · Start on PO Prednisone for gout treatment, d/c colchicine in setting of LEX  · SED rate and CRP elevated  · ID consultation appreciated, will likely require prolonged course of IV abx with vanco and will likely need PICC-- discussed PICC with patient, he is agreeable     Murmur  Assessment & Plan  · Murmur noted on exam   He does not recall ever being told he has a murmur  · Check echo     Thrombocytopenia (HCC)  Assessment & Plan  · POA and trending down  Unclear etiology  He does drink two glasses of wine daily, no known liver problems   · Pt had thrombocytopenia on labs from August of 2020, but appears to likely be related to acute illness requiring surgical resection of bowel at that time, platelet count then normalized   · Trend CBC  · No evidence of active bleeding   · D/c heparin given low platelets (do not think this is cause as POA)  · ? Related to acute illness, surgery   · Hematology evaluation appreciated, ordered additional labs     Controlled type 2 diabetes mellitus, without long-term current use of insulin ([facility])  Assessment & Plan  Hyperglycemia noted on labs   · Hgb A1c is 6 8   · Fingersticks while on steroids   · SSI    · Monitor   · Pt aware of need for f/u     LEX (acute kidney injury) ([address] )  Assessment & Plan  · POA, Cr  2 18  · Per review of outpatient records in care everywhere, most recent Cr noted to be 0 7  · Likely in setting of volume depletion and dehydration from poor PO intake over the last week   · Placed on IVF hydration with improvement, Cr today 0 9  · Trend BMP closely   · Avoid NSAIDs, colchicine discontinued   · Avoid hypotension  · Resolved     Hyponatremia  Assessment & Plan  · POA, sodium 129  · Likely in setting of poor PO intake as an outpatient, reports he had poor appetite while on Bactrim outpatient and did not eat much   · Sodium improved with IV fluids     Idiopathic chronic gout of multiple sites with tophus  Assessment & Plan  · Noted on the right hand   · Typically maintained on indomethacin and colchicine, now discontinued in setting of renal function   · Place on PO prednisone 40 mg daily x 5 days for inflammatory treatment   · Additional plan as above         VTE Pharmacologic Prophylaxis: VTE Score: 3 Moderate Risk (Score 3-4) - Pharmacological DVT Prophylaxis Contraindicated  Sequential Compression Devices Ordered  Patient Centered Rounds: I performed bedside rounds with nursing staff today  Discussions with Specialists or Other Care Team Provider: RNSTEFANO     Education and Discussions with Family / Patient: Patient declined call to   Time Spent for Care: 30 minutes  More than 50% of total time spent on counseling and coordination of care as described above      Current Length of Stay: 3 day(s)  Current Patient Status: Inpatient   Certification Statement: The patient will continue to require additional inpatient hospital stay due to IV abx, thrombocytopenia work up  Discharge Plan: Anticipate discharge in 48-72 hrs to home with home services  once IV antibiotic plan finalized, outpt IV abx arranged, and hematology, ID clears  Code Status: Level 1 - Full Code    Subjective:   Patient has no acute complaints today, feels ok  Says he felt a little dizzy after having oxycodone this morning  Objective:     Vitals:   Temp (24hrs), Av °F (36 7 °C), Min:97 5 °F (36 4 °C), Max:98 3 °F (36 8 °C)    Temp:  [97 5 °F (36 4 °C)-98 3 °F (36 8 °C)] 98 3 °F (36 8 °C)  HR:  [64-76] 76  Resp:  [18-20] 18  BP: (130-145)/(79-85) 132/81  SpO2:  [97 %-100 %] 98 %  There is no height or weight on file to calculate BMI  Input and Output Summary (last 24 hours): Intake/Output Summary (Last 24 hours) at 2022 1054  Last data filed at 2022 1300  Gross per 24 hour   Intake 250 ml   Output --   Net 250 ml       Physical Exam:   Physical Exam  Vitals reviewed  Constitutional:       General: He is not in acute distress  Appearance: He is not toxic-appearing  HENT:      Head: Normocephalic and atraumatic  Eyes:      Extraocular Movements: Extraocular movements intact  Cardiovascular:      Rate and Rhythm: Normal rate and regular rhythm  Heart sounds: Murmur heard  Pulmonary:      Effort: Pulmonary effort is normal  No respiratory distress  Breath sounds: Normal breath sounds  Abdominal:      General: Bowel sounds are normal  There is no distension  Palpations: Abdomen is soft  Tenderness: There is no abdominal tenderness  Musculoskeletal:         General: Normal range of motion  Cervical back: Normal range of motion  Comments: RT hand/ring finger wrapped    Skin:     General: Skin is warm and dry  Neurological:      General: No focal deficit present  Mental Status: He is alert and oriented to person, place, and time     Psychiatric:         Mood and Affect: Mood normal          Behavior: Behavior normal          Thought Content: Thought content normal           Additional Data:     Labs:  Results from last 7 days   Lab Units 09/12/22  0601   WBC Thousand/uL 5 33   HEMOGLOBIN g/dL 9 6*   HEMATOCRIT % 26 9*   PLATELETS Thousands/uL 43*   NEUTROS PCT % 84*   LYMPHS PCT % 12*   MONOS PCT % 3*   EOS PCT % 0     Results from last 7 days   Lab Units 09/12/22  0601   SODIUM mmol/L 134*   POTASSIUM mmol/L 4 6   CHLORIDE mmol/L 106   CO2 mmol/L 23   BUN mg/dL 28*   CREATININE mg/dL 0 98   ANION GAP mmol/L 5   CALCIUM mg/dL 8 9   ALBUMIN g/dL 2 5*   TOTAL BILIRUBIN mg/dL 0 57   ALK PHOS U/L 60   ALT U/L 35   AST U/L 39   GLUCOSE RANDOM mg/dL 173*     Results from last 7 days   Lab Units 09/09/22  0431   INR  1 13     Results from last 7 days   Lab Units 09/12/22  0852 09/11/22  2103 09/11/22  1711 09/11/22  1139 09/11/22  0742 09/10/22  2051 09/10/22  1724 09/10/22  1139 09/10/22  0731 09/09/22  2133 09/09/22  1759 09/09/22  1201   POC GLUCOSE mg/dl 166* 354* 364* 317* 209* 378* 296* 186* 134 233* 268* 147*     Results from last 7 days   Lab Units 09/09/22  0431   HEMOGLOBIN A1C % 6 8*           Lines/Drains:  Invasive Devices  Report    Peripheral Intravenous Line  Duration           Peripheral IV 09/09/22 Left;Ventral (anterior) Forearm 2 days                      Imaging: No pertinent imaging reviewed      Recent Cultures (last 7 days):   Results from last 7 days   Lab Units 09/10/22  0837 09/10/22  0834   GRAM STAIN RESULT  No Polys or Bacteria seen No Polys or Bacteria seen       Last 24 Hours Medication List:   Current Facility-Administered Medications   Medication Dose Route Frequency Provider Last Rate    acetaminophen  650 mg Oral Q6H PRN David Leong MD      aluminum-magnesium hydroxide-simethicone  30 mL Oral Q6H PRN David Leong MD      docusate sodium  100 mg Oral BID PRN David Leong MD      insulin lispro  1-5 Units Subcutaneous TID AC bAby Rollins PA-C      insulin lispro  1-5 Units Subcutaneous HS Abby Rollins PA-C      ondansetron  4 mg Intravenous Q6H PRN Elvin Dance, MD      oxyCODONE  5 mg Oral Q4H PRN Elvin Dance, MD      predniSONE  40 mg Oral Daily Elvin Dance, MD      sodium chloride  75 mL/hr Intravenous Continuous Elvin Dance, MD Stopped (09/10/22 1449)    vancomycin  12 5 mg/kg Intravenous Q12H Bill Solis MD 1,000 mg (09/12/22 0902)        Today, Patient Was Seen By: [facility] KIT BRITT PA-C    **Please Note: This note may have been constructed using a voice recognition system  **

## 2022-09-12 NOTE — ASSESSMENT & PLAN NOTE
· Pt presented with right hand tophi gout with swelling and pain   · Ortho following,  · MRI obtained - Destructive change of the distal aspect of the 4th digit middle phalanx and base of the 4th digit distal phalanx with adjacent joint effusion  Trace tenosynovitis of the 4th digit flexor tendon at level of base of 4th digit middle phalanx likely inflammatory   Infectious tenosynovitis is less likely given appearance   · S/p Bactrim as an outpatient without improvement -- wound culture from 8/31 with Staph aureus and operative culture from 9/10 also growing Staph aureus     · Currently on IV Vancomycin (pt notes allergy/intolerance to Keflex -- reports joint pain/stiff joints?)   · NWB RUE, s/p debridement/wash out with hand surgery on 9/10  · Start on PO Prednisone for gout treatment, d/c colchicine in setting of LEX  · SED rate and CRP elevated  · ID consultation appreciated, will likely require prolonged course of IV abx with vanco and will likely need PICC-- discussed PICC with patient, he is agreeable

## 2022-09-12 NOTE — PROGRESS NOTES
Progress Note - Infectious Disease   Stanislaw Marie 62 y o  male MRN: [id number]  Unit/Bed#: [facility] 623-01 Encounter: [id number]      Impression:  1  Tophaceous gouty destruction of the 4th middle and distal phalanx with secondary Staphylococcus aureus (MSSA) osteomyelitis S/P I&D day 2 POD  2  Type 2 DM     Recommendations:  Patient is afebrile with normal WBC count  1  Orthopedic wound description and picture noted  2  Continue vancomycin 1 g q 12 hours IV with further dosing by pharmacy  Anticipating a 28 day course    Antibiotics:  1  Vancomycin 1 g q 12 hours IV, day 3 Rx of 28    Subjective: The patient has no complaints  Denies fevers, chills, or sweats  Denies nausea, vomiting, or diarrhea  Objective:  Vitals:  Temp:  [97 5 °F (36 4 °C)-98 3 °F (36 8 °C)] 98 1 °F (36 7 °C)  HR:  [61-76] 61  Resp:  [16-20] 16  BP: (131-145)/(79-83) 133/83  SpO2:  [98 %-100 %] 98 %  Temp (24hrs), Av °F (36 7 °C), Min:97 5 °F (36 4 °C), Max:98 3 °F (36 8 °C)  Current: Temperature: 98 1 °F (36 7 °C)    Physical Exam:     General Appearance:  Alert, nontoxic, no acute distress  Throat: Oropharynx moist without lesions  Lips, mucosa, and tongue normal   Neck: Supple, symmetrical, trachea midline, no adenopathy,  no tenderness/mass/nodules   Lungs:   Clear to auscultation bilaterally, no audible wheezes, rhonchi or rales; respirations unlabored   Heart:  Regular rate and rhythm, S1, S2 normal, no murmur, rub or gallop   Abdomen:   Soft, non-tender, non-distended, positive bowel sounds  No masses, no organomegaly    No CVA tenderness   Extremities: Tophaceous gouty arthropathy of fingers and toes, right 4th finger with dry surgical dressing  Picture by orthopedics noted without any inflammation or purulence  Skin: Skin color pale, as above           Invasive Devices  Report    Peripheral Intravenous Line  Duration           Peripheral IV 22 Left;Ventral (anterior) Forearm 2 days                Labs, Imaging, & Other studies:   All pertinent labs were personally reviewed  Results from last 7 days   Lab Units 09/12/22  0601 09/11/22  1656 09/11/22  0649 09/10/22  0600   WBC Thousand/uL 5 33 5 76 5 18 5 85   HEMOGLOBIN g/dL 9 6* 10 3* 10 0* 10 0*   PLATELETS Thousands/uL 43*  --  46* 53*     Results from last 7 days   Lab Units 09/12/22  0601 09/11/22  0649 09/10/22  0600 09/09/22  0431   SODIUM mmol/L 134* 134* 135 131*   POTASSIUM mmol/L 4 6 4 8 4 7 4 3   CHLORIDE mmol/L 106 106 107 105   CO2 mmol/L 23 21 21 21   BUN mg/dL 28* 31* 34* 33*   CREATININE mg/dL 0 98 1 20 1 38* 2 13*   EGFR ml/min/1 73sq m 83 65 55 32   CALCIUM mg/dL 8 9 8 9 8 8 8 5   AST U/L 39  --   --  31   ALT U/L 35  --   --  33   ALK PHOS U/L 60  --   --  86     Results from last 7 days   Lab Units 09/10/22  0837 09/10/22  0834   GRAM STAIN RESULT  No Polys or Bacteria seen No Polys or Bacteria seen

## 2022-09-12 NOTE — RESTORATIVE TECHNICIAN NOTE
Restorative Technician Note      Patient Name: Dorina Oconnor     Restorative Tech Visit Date: 9/12/2022  Note Type: Mobility  Patient Position Upon Consult: Supine  Activity Performed: Ambulated  Assistive Device: Other (Comment) (no AD)  Patient Position at End of Consult: Supine;  All needs within reach      Joe Cisneros Restorative Technician

## 2022-09-12 NOTE — PROGRESS NOTES
Vancomycin IV Pharmacy-to-Dose Consultation    Regina Baldwin is a 62 y o  male who is currently receiving Vancomycin IV for treatment of wound of hand with osteomyelitis, with management by the Pharmacy Consult service  Assessment/Plan:  The patient was reviewed  Renal function is stable and no signs or symptoms of nephrotoxicity and/or infusion reactions were documented in the chart  Will continue IV Vancomycin per ID recommendations  Based on todays assessment, continue current vancomycin (day # 3) dosing of 1,000 mg IV every 12h, with a plan for trough to be drawn at 0800 on 9/13  We will continue to follow the patients culture results and clinical progress daily      Dorene Mejía, Pharmacist

## 2022-09-12 NOTE — PROGRESS NOTES
Oncology Progress Note  Janet Kiran 62 y o  male MRN: [id number]  Unit/Bed#: [facility] 623-01 Encounter: [id number]      Oncology History    No history exists  Subjective:  He has no complains today  Gout flare is improving  He was informed that he will need a hematology follow up at [place], [place] (or nearby areas)  He was updated about his folate and B12 deficiency and its relative impact on platelets  No bleeding reported  Objective:    - GENERAL: Negative for any nausea, vomiting, fevers, chills, or weight loss  - HEENT: Negative for any head/Neck trauma, pain, double/blurry vision, sinusitis, rhinitis, nose bleeding   - CARDIAC: Negative for any chest pain, palpitation, Dyspnea on exertion, peripheral edema  - PULMONARY: Negative for any SOB, cough, wheezing    - GASTROINTESTINAL: Negative for any abdominal pain, N/V/D/C, blood in stool    - GENITOURINARY: Negative for any dysuria, hematuria, incontinence   - NEUROLOGIC: Negative for any muscle weakness, numbness/tingling, memory changes  - MUSCULOSKELETAL: Right second digit bandaged, third digit PIP swollen, mild pain in finger joints, limited ROM  - INTEGUMENTARY: Negative for any rashes, cuts/ lesions   - HEMATOLOGIC: Negative for any abnormal bruising, frequent infections or bleeding  /81   Pulse 76   Temp 98 3 °F (36 8 °C)   Resp 18   SpO2 98%   General Appearance:    Alert, oriented        Eyes:    PERRL   Ears:    Normal external ear canals, both ears   Nose:   Nares normal, septum midline   Throat:   Mucosa moist  Pharynx without injection  Neck:   Supple       Lungs:     Clear to auscultation bilaterally   Chest Wall:    No tenderness or deformity    Heart:    Regular rate and rhythm       Abdomen:     Soft, non-tender, bowel sounds +, no organomegaly           Extremities:   Extremities no cyanosis or edema       Skin:   no rash or icterus      Lymph nodes:   Cervical, supraclavicular, and axillary nodes normal Neurologic:   CNII-XII intact, normal strength, sensation and reflexes     throughout        Recent Results (from the past 48 hour(s))   Fingerstick Glucose (POCT)    Collection Time: 09/10/22  5:24 PM   Result Value Ref Range    POC Glucose 296 (H) 65 - 140 mg/dl   Fingerstick Glucose (POCT)    Collection Time: 09/10/22  8:51 PM   Result Value Ref Range    POC Glucose 378 (H) 65 - 140 mg/dl   Vancomycin, random    Collection Time: 09/11/22  6:49 AM   Result Value Ref Range    Vancomycin Rm 11 1 10 0 - 20 0 ug/mL   CBC and differential    Collection Time: 09/11/22  6:49 AM   Result Value Ref Range    WBC 5 18 4 31 - 10 16 Thousand/uL    RBC 2 40 (L) 3 88 - 5 62 Million/uL    Hemoglobin 10 0 (L) 12 0 - 17 0 g/dL    Hematocrit 28 2 (L) 36 5 - 49 3 %     (H) 82 - 98 fL    MCH 41 7 (H) 26 8 - 34 3 pg    MCHC 35 5 31 4 - 37 4 g/dL    RDW 12 4 11 6 - 15 1 %    MPV 10 6 8 9 - 12 7 fL    Platelets 46 (LL) 116 - 390 Thousands/uL    nRBC 0 /100 WBCs    Neutrophils Relative 87 (H) 43 - 75 %    Immat GRANS % 0 0 - 2 %    Lymphocytes Relative 11 (L) 14 - 44 %    Monocytes Relative 2 (L) 4 - 12 %    Eosinophils Relative 0 0 - 6 %    Basophils Relative 0 0 - 1 %    Neutrophils Absolute 4 50 1 85 - 7 62 Thousands/µL    Immature Grans Absolute 0 02 0 00 - 0 20 Thousand/uL    Lymphocytes Absolute 0 57 (L) 0 60 - 4 47 Thousands/µL    Monocytes Absolute 0 08 (L) 0 17 - 1 22 Thousand/µL    Eosinophils Absolute 0 00 0 00 - 0 61 Thousand/µL    Basophils Absolute 0 01 0 00 - 0 10 Thousands/µL   Basic metabolic panel    Collection Time: 09/11/22  6:49 AM   Result Value Ref Range    Sodium 134 (L) 135 - 147 mmol/L    Potassium 4 8 3 5 - 5 3 mmol/L    Chloride 106 96 - 108 mmol/L    CO2 21 21 - 32 mmol/L    ANION GAP 7 4 - 13 mmol/L    BUN 31 (H) 5 - 25 mg/dL    Creatinine 1 20 0 60 - 1 30 mg/dL    Glucose 213 (H) 65 - 140 mg/dL    Calcium 8 9 8 3 - 10 1 mg/dL    eGFR 65 ml/min/1 73sq m   Fingerstick Glucose (POCT)    Collection Time: 09/11/22  7:42 AM   Result Value Ref Range    POC Glucose 209 (H) 65 - 140 mg/dl   Fingerstick Glucose (POCT)    Collection Time: 09/11/22 11:39 AM   Result Value Ref Range    POC Glucose 317 (H) 65 - 140 mg/dl   Folate    Collection Time: 09/11/22  4:56 PM   Result Value Ref Range    Folate 1 9 (L) 3 1 - 17 5 ng/mL   Vitamin B12    Collection Time: 09/11/22  4:56 PM   Result Value Ref Range    Vitamin B-12 156 100 - 900 pg/mL   HIV 1/2 ANTIGEN/ANTIBODY (4TH GENERATION) W REFLEX [facility]    Collection Time: 09/11/22  4:56 PM   Result Value Ref Range    HIV-1/HIV-2 Ab Non-Reactive Non-Reactive   Hepatitis panel, acute    Collection Time: 09/11/22  4:56 PM   Result Value Ref Range    Hepatitis B Surface Ag Non-reactive Non-reactive, NonReactive - Confirmed    Hep A IgM Non-reactive Non-reactive, Equivocal-Suggest Recollect    Hepatitis C Ab Non-reactive Non-reactive    Hep B C IgM Non-reactive Non-reactive   CBC and Platelet    Collection Time: 09/11/22  4:56 PM   Result Value Ref Range    WBC 5 76 4 31 - 10 16 Thousand/uL    RBC 2 46 (L) 3 88 - 5 62 Million/uL    Hemoglobin 10 3 (L) 12 0 - 17 0 g/dL    Hematocrit 29 5 (L) 36 5 - 49 3 %     (H) 82 - 98 fL    MCH 41 9 (H) 26 8 - 34 3 pg    MCHC 34 9 31 4 - 37 4 g/dL    RDW 12 5 11 6 - 15 1 %    Platelets      MPV 88 6 8 9 - 12 7 fL   Lactate dehydrogenase    Collection Time: 09/11/22  4:56 PM   Result Value Ref Range     81 - 234 U/L   Retic Count    Collection Time: 09/11/22  4:56 PM   Result Value Ref Range    Retic Ct Abs 12,500 (L) 14,356 - 105,094    Retic Ct Pct 0 51 0 37 - 1 87 %   Specimen draw for platelet clumping    Collection Time: 09/11/22  4:56 PM   Result Value Ref Range    Citrated Platelets 43 (LL) 099 - 390 Thousands/uL   Fingerstick Glucose (POCT)    Collection Time: 09/11/22  5:11 PM   Result Value Ref Range    POC Glucose 364 (H) 65 - 140 mg/dl   Fingerstick Glucose (POCT)    Collection Time: 09/11/22  9:03 PM   Result Value Ref Range    POC Glucose 354 (H) 65 - 140 mg/dl   CBC and differential    Collection Time: 09/12/22  6:01 AM   Result Value Ref Range    WBC 5 33 4 31 - 10 16 Thousand/uL    RBC 2 26 (L) 3 88 - 5 62 Million/uL    Hemoglobin 9 6 (L) 12 0 - 17 0 g/dL    Hematocrit 26 9 (L) 36 5 - 49 3 %     (H) 82 - 98 fL    MCH 42 5 (H) 26 8 - 34 3 pg    MCHC 35 7 31 4 - 37 4 g/dL    RDW 12 0 11 6 - 15 1 %    MPV 10 9 8 9 - 12 7 fL    Platelets 43 (LL) 613 - 390 Thousands/uL    nRBC 0 /100 WBCs    Neutrophils Relative 84 (H) 43 - 75 %    Immat GRANS % 1 0 - 2 %    Lymphocytes Relative 12 (L) 14 - 44 %    Monocytes Relative 3 (L) 4 - 12 %    Eosinophils Relative 0 0 - 6 %    Basophils Relative 0 0 - 1 %    Neutrophils Absolute 4 49 1 85 - 7 62 Thousands/µL    Immature Grans Absolute 0 05 0 00 - 0 20 Thousand/uL    Lymphocytes Absolute 0 65 0 60 - 4 47 Thousands/µL    Monocytes Absolute 0 14 (L) 0 17 - 1 22 Thousand/µL    Eosinophils Absolute 0 00 0 00 - 0 61 Thousand/µL    Basophils Absolute 0 00 0 00 - 0 10 Thousands/µL   Comprehensive metabolic panel    Collection Time: 09/12/22  6:01 AM   Result Value Ref Range    Sodium 134 (L) 135 - 147 mmol/L    Potassium 4 6 3 5 - 5 3 mmol/L    Chloride 106 96 - 108 mmol/L    CO2 23 21 - 32 mmol/L    ANION GAP 5 4 - 13 mmol/L    BUN 28 (H) 5 - 25 mg/dL    Creatinine 0 98 0 60 - 1 30 mg/dL    Glucose 173 (H) 65 - 140 mg/dL    Calcium 8 9 8 3 - 10 1 mg/dL    Corrected Calcium 10 1 8 3 - 10 1 mg/dL    AST 39 5 - 45 U/L    ALT 35 12 - 78 U/L    Alkaline Phosphatase 60 46 - 116 U/L    Total Protein 6 2 (L) 6 4 - 8 4 g/dL    Albumin 2 5 (L) 3 5 - 5 0 g/dL    Total Bilirubin 0 57 0 20 - 1 00 mg/dL    eGFR 83 ml/min/1 73sq m   Fingerstick Glucose (POCT)    Collection Time: 09/12/22  8:52 AM   Result Value Ref Range    POC Glucose 166 (H) 65 - 140 mg/dl   Fingerstick Glucose (POCT)    Collection Time: 09/12/22 11:29 AM   Result Value Ref Range    POC Glucose 258 (H) 65 - 140 mg/dl         XR chest portable    Result Date: 9/9/2022  Narrative: CHEST INDICATION:   Pre Op  COMPARISON:  None EXAM PERFORMED/VIEWS:  XR CHEST PORTABLE FINDINGS: Cardiomediastinal silhouette appears unremarkable  The lungs are clear  No pneumothorax or pleural effusion  Osseous structures appear within normal limits for patient age  Impression: No acute cardiopulmonary disease  Workstation performed: [id number]     X-ray hand right 3+ views    Result Date: 9/8/2022  Narrative: RIGHT HAND INDICATION:  L72 3: Sebaceous cyst  COMPARISON:  None VIEWS:  XR HAND 3+ VW RIGHT Images: 4 For the purposes of institution wide universal language the following terms will apply: (thumb=1st digit/finger, index finger=2nd digit/finger, long finger=3rd digit/finger, ring=4th digit/finger and small finger=5th digit/finger) FINDINGS: There is osteolytic/erosive changes involving the base of the 4th distal phalanx and head of the 4th middle phalanx  No other focal erosive changes are seen  There is no acute fracture or dislocation  Soft tissue swelling most notably about the 3rd PIP, 4th DIP and 2nd PIP joints  No soft tissue gas  Vascular calcifications  Impression: Soft tissue swelling 2nd through 4th digits as above with osteolytic/erosive changes about the 4th DIP joint  Differential considerations regarding the 4th digit include gouty arthropathy, rheumatoid arthritis as well as infection (osteomyelitis)  Clinical correlation is necessary  The study was marked in [facility] for immediate notification  Workstation performed: [id number]     MRI hand right w wo contrast    Result Date: 9/9/2022  Narrative: MRI RIGHT HAND WITH AND WITHOUT CONTRAST INDICATION:   4th digit swelling, concern for osteomyelitis  COMPARISON:  Right hand radiograph 9/8/2022 TECHNIQUE: MR was obtained of the right hand  Precontrast: Sagittal STIR, Axial T2 fat sat, Coronal T1, Coronal T2 fat sat, Axial T1, Coronal 3D SPGR, and axial T1 fat sat sequences were obtained  Postcontrast:   Postcontrast: axial T1 fat sat, and coronal T1 fat sat  IV Contrast:  7 mL of Gadobutrol injection (SINGLE-DOSE) FINDINGS: SUBCUTANEOUS TISSUES: Normal FLEXOR/EXTENSOR TENDONS: Trace fluid about the 4th digit flexor tendon at the level of the base of the 4th digit middle phalanx  MUSCULATURE: Normal  ARTICULAR SURFACES:  There are degenerative changes scattered throughout the DIP and PIP joints with early erosive change and joint effusion is present at the 2nd and 3rd digit PIP joints  There is disproportionate destructive change at the 4th digit DIP joint with adjacent effusion  BONES: There is destructive change with increased T2 and subtle low T1 signal within the distal aspect of the 4th digit middle phalanx and the proximal aspect of the 4th digit distal phalanx  No significant enhancement on postcontrast images  The remainder the osseous structures demonstrate normal marrow signal  SOFT TISSUES: Normal      Impression: 1  Destructive change of the distal aspect 4th digit middle phalanx and base of the 4th digit distal phalanx with adjacent joint effusion  Similar appearing joint effusions at the 2nd and 3rd digit PIP joints however there is is not significant osseous  destructive change at these joints  Differential considerations include inflammatory arthropathy although septic arthritis at the 4th digit DIP joint cannot be excluded on this exam   Clinical and laboratory correlation is recommended and joint aspiration should be considered  2   Trace tenosynovitis of the 4th digit flexor tendon at the level of the base of the 4th digit middle phalanx likely inflammatory  Fluid does not extend proximally along the tendon  Infectious tenosynovitis is less likely given the appearance  The study was marked in [employer] for significant notification  Workstation performed: [id number]     Assessment and Plan :  HIV, hep panel are negative  US abd, SPEP /SFLC pending   Retic, LDH is normal  Labs results so far show low B12 levels and folate deficiency  His MCV is elevated  Thrombocytopenia and macrocytosis may be a result of B12/folate deficiency  Plan is to start patient on daily B12 IM injections for 3 days and Folate PO daily  Patient needs a hematology appointment outpatient which we are trying to setup  Monitor labs  We will follow up with other results  I spent 40 minutes in chart review, face to face counseling, coordination of care and documentation

## 2022-09-12 NOTE — PROGRESS NOTES
Orthopedics   Geronimo Spencer 62 y o  male MRN: [id number]  Unit/Bed#: [facility] 623-01      Subjective:  62 y o male post operative day 3 right RF I&D  Doing well  Pain controlled      Labs:  0   Lab Value Date/Time    HCT 26 9 (L) 09/12/2022 0601    HCT 29 5 (L) 09/11/2022 1656    HCT 28 2 (L) 09/11/2022 0649    HGB 9 6 (L) 09/12/2022 0601    HGB 10 3 (L) 09/11/2022 1656    HGB 10 0 (L) 09/11/2022 0649    INR 1 13 09/09/2022 0431    WBC 5 33 09/12/2022 0601    WBC 5 76 09/11/2022 1656    WBC 5 18 09/11/2022 0649    ESR 57 (H) 09/08/2022 2032    CRP 39 1 (H) 09/08/2022 2032       Meds:    Current Facility-Administered Medications:     acetaminophen (TYLENOL) tablet 650 mg, 650 mg, Oral, Q6H PRN, Arlen Leong MD, 650 mg at 09/09/22 1814    aluminum-magnesium hydroxide-simethicone (MYLANTA) oral suspension 30 mL, 30 mL, Oral, Q6H PRN, Arlen Leong MD    cyanocobalamin injection 1,000 mcg, 1,000 mcg, Intramuscular, Daily, Cass Flores MD, 1,000 mcg at 09/12/22 1638    docusate sodium (COLACE) capsule 100 mg, 100 mg, Oral, BID PRN, Arlen Leong MD    folic acid (FOLVITE) tablet 1 mg, 1 mg, Oral, Daily, Cass Flores MD, 1 mg at 09/12/22 1626    insulin lispro (HumaLOG) 100 units/mL subcutaneous injection 1-5 Units, 1-5 Units, Subcutaneous, TID AC, 2 Units at 09/12/22 1803 **AND** Fingerstick Glucose (POCT), , , TID AC, Abby Rollins PA-C    insulin lispro (HumaLOG) 100 units/mL subcutaneous injection 1-5 Units, 1-5 Units, Subcutaneous, HS, Abby Rollins PA-C, 3 Units at 09/12/22 2150    ondansetron (ZOFRAN) injection 4 mg, 4 mg, Intravenous, Q6H PRN, Arlen Leong MD    oxyCODONE (ROXICODONE) IR tablet 5 mg, 5 mg, Oral, Q4H PRN, Arlen Leong MD, 5 mg at 09/12/22 2202    predniSONE tablet 40 mg, 40 mg, Oral, Daily, Arlen Leong MD, 40 mg at 09/12/22 8536    sodium chloride 0 9 % infusion, 75 mL/hr, Intravenous, Continuous, Melita Linares MD, Stopped at 09/10/22 4052   vancomycin (VANCOCIN) IVPB (premix in dextrose) 1,000 mg 200 mL, 12 5 mg/kg, Intravenous, Q12H, Lizzie Schwab, MD, Last Rate: 200 mL/hr at 09/12/22 2148, 1,000 mg at 09/12/22 2148    Blood Culture:   No results found for: BLOODCX    Wound Culture:   Lab Results   Component Value Date    WOUNDCULT 3+ Growth of Staphylococcus aureus (A) 08/29/2022       Ins and Outs:  I/O last 24 hours: In: 240 [P O :240]  Out: -           Physical Exam:  Vitals:    09/13/22 0000   BP: 135/82   Pulse: 63   Resp: 18   Temp: 98 2 °F (36 8 °C)   SpO2: 97%     right Upper Extremity extremity:  · Incision C/D/I, no erythema  · Swelling of the long and ring fingers  · Digits soft and compressible  · tender over the PIPJ of ring and long fingers, not tender on the flexor tendon  · Able to flex extend at the PIPJ  · Motor and Sensation intact to light touch median ulnar and radial nerves  Assessment: 61 y o male post operative day 3  right ring and long finger I&D  Tissue cx grew MSSA, bone cx staph aureus    Patient is improved since surgery    Plan:  · Antibiotics per ID - vanco x 28d  · DVT prophylaxis with SCDs  · Analgesics  · PT/OT  · Dispo: Ortho will follow  · Will continue to assess for acute blood loss anemia    Jonathan Jackson MD

## 2022-09-12 NOTE — ASSESSMENT & PLAN NOTE
· POA, sodium 129  · Likely in setting of poor PO intake as an outpatient, reports he had poor appetite while on Bactrim outpatient and did not eat much   · Sodium improved with IV fluids

## 2022-09-13 ENCOUNTER — APPOINTMENT (OUTPATIENT)
Dept: RADIOLOGY | Facility: HOSPITAL | Age: 62
DRG: 580 | End: 2022-09-13
Payer: MEDICARE

## 2022-09-13 ENCOUNTER — APPOINTMENT (INPATIENT)
Dept: NON INVASIVE DIAGNOSTICS | Facility: HOSPITAL | Age: 62
DRG: 580 | End: 2022-09-13
Payer: MEDICARE

## 2022-09-13 PROBLEM — E87.1 HYPONATREMIA: Status: RESOLVED | Noted: 2022-09-09 | Resolved: 2022-09-13

## 2022-09-13 LAB
ALBUMIN SERPL ELPH-MCNC: 3.18 G/DL (ref 3.5–5)
ALBUMIN SERPL ELPH-MCNC: 50.5 % (ref 52–65)
ALPHA1 GLOB SERPL ELPH-MCNC: 0.37 G/DL (ref 0.1–0.4)
ALPHA1 GLOB SERPL ELPH-MCNC: 5.9 % (ref 2.5–5)
ALPHA2 GLOB SERPL ELPH-MCNC: 0.77 G/DL (ref 0.4–1.2)
ALPHA2 GLOB SERPL ELPH-MCNC: 12.2 % (ref 7–13)
ANION GAP SERPL CALCULATED.3IONS-SCNC: 5 MMOL/L (ref 4–13)
AORTIC ROOT: 3.2 CM
APICAL FOUR CHAMBER EJECTION FRACTION: 56 %
BACTERIA TISS AEROBE CULT: ABNORMAL
BETA GLOB ABNORMAL SERPL ELPH-MCNC: 0.45 G/DL (ref 0.4–0.8)
BETA1 GLOB SERPL ELPH-MCNC: 7.2 % (ref 5–13)
BETA2 GLOB SERPL ELPH-MCNC: 8.2 % (ref 2–8)
BETA2+GAMMA GLOB SERPL ELPH-MCNC: 0.52 G/DL (ref 0.2–0.5)
BUN SERPL-MCNC: 27 MG/DL (ref 5–25)
CALCIUM SERPL-MCNC: 8.9 MG/DL (ref 8.3–10.1)
CHLORIDE SERPL-SCNC: 104 MMOL/L (ref 96–108)
CO2 SERPL-SCNC: 26 MMOL/L (ref 21–32)
CREAT SERPL-MCNC: 1.02 MG/DL (ref 0.6–1.3)
E WAVE DECELERATION TIME: 173 MS
ERYTHROCYTE [DISTWIDTH] IN BLOOD BY AUTOMATED COUNT: 12.4 % (ref 11.6–15.1)
FRACTIONAL SHORTENING: 31 % (ref 28–44)
GAMMA GLOB ABNORMAL SERPL ELPH-MCNC: 1.01 G/DL (ref 0.5–1.6)
GAMMA GLOB SERPL ELPH-MCNC: 16 % (ref 12–22)
GFR SERPL CREATININE-BSD FRML MDRD: 79 ML/MIN/1.73SQ M
GLUCOSE SERPL-MCNC: 138 MG/DL (ref 65–140)
GLUCOSE SERPL-MCNC: 158 MG/DL (ref 65–140)
GLUCOSE SERPL-MCNC: 183 MG/DL (ref 65–140)
GLUCOSE SERPL-MCNC: 331 MG/DL (ref 65–140)
GLUCOSE SERPL-MCNC: 362 MG/DL (ref 65–140)
GRAM STN SPEC: ABNORMAL
HAPTOGLOB SERPL-MCNC: 240 MG/DL (ref 29–370)
HCT VFR BLD AUTO: 27.6 % (ref 36.5–49.3)
HGB BLD-MCNC: 9.7 G/DL (ref 12–17)
IGG/ALB SER: 1.02 {RATIO} (ref 1.1–1.8)
INTERVENTRICULAR SEPTUM IN DIASTOLE (PARASTERNAL SHORT AXIS VIEW): 1.4 CM
INTERVENTRICULAR SEPTUM: 1.4 CM (ref 0.6–1.1)
KAPPA LC FREE SER-MCNC: 20.6 MG/L (ref 3.3–19.4)
KAPPA LC FREE/LAMBDA FREE SER: 0.91 {RATIO} (ref 0.26–1.65)
LAAS-AP2: 9.7 CM2
LAAS-AP4: 16.6 CM2
LAMBDA LC FREE SERPL-MCNC: 22.6 MG/L (ref 5.7–26.3)
LEFT ATRIUM AREA SYSTOLE SINGLE PLANE A4C: 13.6 CM2
LEFT ATRIUM SIZE: 3 CM
LEFT INTERNAL DIMENSION IN SYSTOLE: 2.9 CM (ref 2.1–4)
LEFT VENTRICULAR INTERNAL DIMENSION IN DIASTOLE: 4.2 CM (ref 3.5–6)
LEFT VENTRICULAR POSTERIOR WALL IN END DIASTOLE: 1.4 CM
LEFT VENTRICULAR STROKE VOLUME: 46 ML
LVSV (TEICH): 46 ML
MCH RBC QN AUTO: 41.8 PG (ref 26.8–34.3)
MCHC RBC AUTO-ENTMCNC: 35.1 G/DL (ref 31.4–37.4)
MCV RBC AUTO: 119 FL (ref 82–98)
MV E'TISSUE VEL-SEP: 8 CM/S
MV PEAK A VEL: 1.16 M/S
MV PEAK E VEL: 111 CM/S
MV STENOSIS PRESSURE HALF TIME: 50 MS
MV VALVE AREA P 1/2 METHOD: 4.4 CM2
PLATELET # BLD AUTO: 49 THOUSANDS/UL (ref 149–390)
PMV BLD AUTO: 11.3 FL (ref 8.9–12.7)
POTASSIUM SERPL-SCNC: 4.1 MMOL/L (ref 3.5–5.3)
PROT PATTERN SERPL ELPH-IMP: ABNORMAL
PROT SERPL-MCNC: 6.3 G/DL (ref 6.4–8.2)
RBC # BLD AUTO: 2.32 MILLION/UL (ref 3.88–5.62)
RIGHT ATRIUM AREA SYSTOLE A4C: 11.4 CM2
RIGHT VENTRICLE ID DIMENSION: 3.6 CM
SL CV LEFT ATRIUM LENGTH A2C: 3.4 CM
SL CV LV EF: 60
SL CV PED ECHO LEFT VENTRICLE DIASTOLIC VOLUME (MOD BIPLANE) 2D: 79 ML
SL CV PED ECHO LEFT VENTRICLE SYSTOLIC VOLUME (MOD BIPLANE) 2D: 33 ML
SODIUM SERPL-SCNC: 135 MMOL/L (ref 135–147)
TR MAX PG: 24 MMHG
TR PEAK VELOCITY: 2.4 M/S
TRICUSPID VALVE PEAK REGURGITATION VELOCITY: 2.44 M/S
VANCOMYCIN TROUGH SERPL-MCNC: 26.3 UG/ML (ref 10–20)
VANCOMYCIN TROUGH SERPL-MCNC: 39.7 UG/ML (ref 10–20)
WBC # BLD AUTO: 5.77 THOUSAND/UL (ref 4.31–10.16)

## 2022-09-13 PROCEDURE — 80048 BASIC METABOLIC PNL TOTAL CA: CPT | Performed by: PHYSICIAN ASSISTANT

## 2022-09-13 PROCEDURE — 82948 REAGENT STRIP/BLOOD GLUCOSE: CPT

## 2022-09-13 PROCEDURE — 99231 SBSQ HOSP IP/OBS SF/LOW 25: CPT | Performed by: INTERNAL MEDICINE

## 2022-09-13 PROCEDURE — 93306 TTE W/DOPPLER COMPLETE: CPT | Performed by: INTERNAL MEDICINE

## 2022-09-13 PROCEDURE — 93306 TTE W/DOPPLER COMPLETE: CPT

## 2022-09-13 PROCEDURE — 76700 US EXAM ABDOM COMPLETE: CPT

## 2022-09-13 PROCEDURE — 80202 ASSAY OF VANCOMYCIN: CPT | Performed by: INTERNAL MEDICINE

## 2022-09-13 PROCEDURE — 80202 ASSAY OF VANCOMYCIN: CPT | Performed by: PHYSICIAN ASSISTANT

## 2022-09-13 PROCEDURE — 85027 COMPLETE CBC AUTOMATED: CPT | Performed by: PHYSICIAN ASSISTANT

## 2022-09-13 PROCEDURE — 84165 PROTEIN E-PHORESIS SERUM: CPT | Performed by: PATHOLOGY

## 2022-09-13 PROCEDURE — NC001 PR NO CHARGE: Performed by: SURGERY

## 2022-09-13 PROCEDURE — 99232 SBSQ HOSP IP/OBS MODERATE 35: CPT | Performed by: NURSE PRACTITIONER

## 2022-09-13 RX ADMIN — PREDNISONE 40 MG: 20 TABLET ORAL at 09:02

## 2022-09-13 RX ADMIN — OXYCODONE HYDROCHLORIDE 5 MG: 5 TABLET ORAL at 22:20

## 2022-09-13 RX ADMIN — FOLIC ACID 1 MG: 1 TABLET ORAL at 09:02

## 2022-09-13 RX ADMIN — INSULIN LISPRO 3 UNITS: 100 INJECTION, SOLUTION INTRAVENOUS; SUBCUTANEOUS at 21:59

## 2022-09-13 RX ADMIN — INSULIN LISPRO 3 UNITS: 100 INJECTION, SOLUTION INTRAVENOUS; SUBCUTANEOUS at 18:44

## 2022-09-13 RX ADMIN — VANCOMYCIN HYDROCHLORIDE 1000 MG: 1 INJECTION, SOLUTION INTRAVENOUS at 22:05

## 2022-09-13 RX ADMIN — VANCOMYCIN HYDROCHLORIDE 1000 MG: 1 INJECTION, SOLUTION INTRAVENOUS at 09:10

## 2022-09-13 RX ADMIN — OXYCODONE HYDROCHLORIDE 5 MG: 5 TABLET ORAL at 09:02

## 2022-09-13 RX ADMIN — CYANOCOBALAMIN 1000 MCG: 1000 INJECTION, SOLUTION INTRAMUSCULAR; SUBCUTANEOUS at 09:02

## 2022-09-13 NOTE — ASSESSMENT & PLAN NOTE
Pt presented with right hand tophi gout with swelling and pain   · Ortho following:  · MRI obtained - Destructive change of the distal aspect of the 4th digit middle phalanx and base of the 4th digit distal phalanx with adjacent joint effusion  Trace tenosynovitis of the 4th digit flexor tendon at level of base of 4th digit middle phalanx likely inflammatory  Infectious tenosynovitis is less likely given appearance   · S/p Bactrim as an outpatient without improvement -- wound culture from 8/31 with Staph aureus and operative culture from 9/10 also growing Staph aureus     · s/p debridement/wash out with hand surgery on 9/10  · On intravenous vancomycin day 4 of 28  · PICC line to be placed today   · Pain control  · Case management working on [facility] home intravenous antibiotics

## 2022-09-13 NOTE — ASSESSMENT & PLAN NOTE
· Noted on the right hand   · Typically maintained on indomethacin and colchicine, now discontinued in setting of renal function   · Continue prednisone course x5 days  · Additional plan as above

## 2022-09-13 NOTE — PLAN OF CARE
Problem: PAIN - ADULT  Goal: Verbalizes/displays adequate comfort level or baseline comfort level  Description: Interventions:  - Encourage patient to monitor pain and request assistance  - Assess pain using appropriate pain scale  - Administer analgesics based on type and severity of pain and evaluate response  - Implement non-pharmacological measures as appropriate and evaluate response  - Consider cultural and social influences on pain and pain management  - Notify physician/advanced practitioner if interventions unsuccessful or patient reports new pain  Outcome: Progressing     Problem: INFECTION - ADULT  Goal: Absence or prevention of progression during hospitalization  Description: INTERVENTIONS:  - Assess and monitor for signs and symptoms of infection  - Monitor lab/diagnostic results  - Monitor all insertion sites, i e  indwelling lines, tubes, and drains  - [place] appropriate cooling/warming therapies per order  - Administer medications as ordered  - Instruct and encourage patient and family to use good hand hygiene technique  - Identify and instruct in appropriate isolation precautions for identified infection/condition  Outcome: Progressing     Problem: SAFETY ADULT  Goal: Patient will remain free of falls  Description: INTERVENTIONS:  - Educate patient/family on patient safety including physical limitations  - Instruct patient to call for assistance with activity   - Consult OT/PT to assist with strengthening/mobility   - Keep Call bell within reach  - Keep bed low and locked with side rails adjusted as appropriate  - Keep care items and personal belongings within reach  - Initiate and maintain comfort rounds  - Make Fall Risk Sign visible to staff  - Apply yellow socks and bracelet for high fall risk patients  - Consider moving patient to room near nurses station  Outcome: Progressing  Goal: Maintain or return to baseline ADL function  Description: INTERVENTIONS:  -  Assess patient's ability to carry out ADLs; assess patient's baseline for ADL function and identify physical deficits which impact ability to perform ADLs (bathing, care of mouth/teeth, toileting, grooming, dressing, etc )  - Assess/evaluate cause of self-care deficits   - Assess range of motion  - Assess patient's mobility; develop plan if impaired  - Assess patient's need for assistive devices and provide as appropriate  - Encourage maximum independence but intervene and supervise when necessary  - Involve family in performance of ADLs  - Assess for home care needs following discharge   - Consider OT consult to assist with ADL evaluation and planning for discharge  - Provide patient education as appropriate  Outcome: Progressing  Goal: Maintains/Returns to pre admission functional level  Description: INTERVENTIONS:  - Perform BMAT or MOVE assessment daily    - Set and communicate daily mobility goal to care team and patient/family/caregiver     - Collaborate with rehabilitation services on mobility goals if consulted  - Out of bed for toileting  - Record patient progress and toleration of activity level   Outcome: Progressing     Problem: DISCHARGE PLANNING  Goal: Discharge to home or other facility with appropriate resources  Description: INTERVENTIONS:  - Identify barriers to discharge w/patient and caregiver  - Arrange for needed discharge resources and transportation as appropriate  - Identify discharge learning needs (meds, wound care, etc )  - Arrange for interpretive services to assist at discharge as needed  - Refer to Case Management Department for coordinating discharge planning if the patient needs post-hospital services based on physician/advanced practitioner order or complex needs related to functional status, cognitive ability, or social support system  Outcome: Progressing     Problem: Knowledge Deficit  Goal: Patient/family/caregiver demonstrates understanding of disease process, treatment plan, medications, and discharge instructions  Description: Complete learning assessment and assess knowledge base    Interventions:  - Provide teaching at level of understanding  - Provide teaching via preferred learning methods  Outcome: Progressing     Problem: HEMATOLOGIC - ADULT  Goal: Maintains hematologic stability  Description: INTERVENTIONS  - Assess for signs and symptoms of bleeding or hemorrhage  - Monitor labs  - Administer supportive blood products/factors as ordered and appropriate  Outcome: Progressing     Problem: MUSCULOSKELETAL - ADULT  Goal: Maintain or return mobility to safest level of function  Description: INTERVENTIONS:  - Assess patient's ability to carry out ADLs; assess patient's baseline for ADL function and identify physical deficits which impact ability to perform ADLs (bathing, care of mouth/teeth, toileting, grooming, dressing, etc )  - Assess/evaluate cause of self-care deficits   - Assess range of motion  - Assess patient's mobility  - Assess patient's need for assistive devices and provide as appropriate  - Encourage maximum independence but intervene and supervise when necessary  - Involve family in performance of ADLs  - Assess for home care needs following discharge   - Consider OT consult to assist with ADL evaluation and planning for discharge  - Provide patient education as appropriate  Outcome: Progressing  Goal: Maintain proper alignment of affected body part  Description: INTERVENTIONS:  - Support, maintain and protect limb and body alignment  - Provide patient/ family with appropriate education  Outcome: Progressing     Problem: Potential for Falls  Goal: Patient will remain free of falls  Description: INTERVENTIONS:  - Educate patient/family on patient safety including physical limitations  - Instruct patient to call for assistance with activity   - Consult OT/PT to assist with strengthening/mobility   - Keep Call bell within reach  - Keep bed low and locked with side rails adjusted as appropriate  - Keep care items and personal belongings within reach  - Initiate and maintain comfort rounds  - Make Fall Risk Sign visible to staff  - Apply yellow socks and bracelet for high fall risk patients  - Consider moving patient to room near nurses station  Outcome: Progressing

## 2022-09-13 NOTE — ASSESSMENT & PLAN NOTE
POA, stable at 53 today  Unclear etiology  He does drink two glasses of wine daily, no known cirrhosis  · Pt had thrombocytopenia on labs from August of 2020, but appears to likely be related to acute illness requiring surgical resection of bowel at that time, platelet count then normalized   · Hematology following, HIV hepatitis panel negative  Reticulocyte, LDH normal   Labs do show B12 and folate deficiency, currently on supplementation    · Abdominal ultrasound pending  · Will need outpatient follow-up at [address] [place] [address] with Dr Darline Camarena

## 2022-09-13 NOTE — ASSESSMENT & PLAN NOTE
POA, Cr  2 18  · Per review of outpatient records in care everywhere, most recent Cr noted to be 0 7  · Likely in setting of volume depletion and dehydration from poor PO intake prior to admission  · Resolved with intravenous hydration

## 2022-09-13 NOTE — PROGRESS NOTES
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 S/P I&D day 3 POD  2  Type 2 DM     Recommendations:  Patient is afebrile with normal WBC count  1  Orthopedic wound description and picture noted   No evidence of inflammation  2  Continue vancomycin 1 g q 12 hours IV with further dosing by pharmacy  Anticipating a 28 day course    Antibiotics:  1  Vancomycin 1 g q 12 hours IV, day 4 Rx of 28    Subjective: The patient has no complaints  Denies fevers, chills, or sweats  Denies nausea, vomiting, or diarrhea  Objective:  Vitals:  Temp:  [97 5 °F (36 4 °C)-98 7 °F (37 1 °C)] 98 7 °F (37 1 °C)  HR:  [59-80] 70  Resp:  [16-18] 16  BP: (133-140)/(77-83) 135/77  SpO2:  [97 %-100 %] 100 %  Temp (24hrs), Av 1 °F (36 7 °C), Min:97 5 °F (36 4 °C), Max:98 7 °F (37 1 °C)  Current: Temperature: 98 7 °F (37 1 °C)    Physical Exam:     General Appearance:  Alert, nontoxic, no acute distress  Throat: Oropharynx moist without lesions  Lips, mucosa, and tongue normal   Neck: Supple, symmetrical, trachea midline, no adenopathy,  no tenderness/mass/nodules   Lungs:   Clear to auscultation bilaterally, no audible wheezes, rhonchi or rales; respirations unlabored   Heart:  Regular rate and rhythm, S1, S2 normal, no murmur, rub or gallop   Abdomen:   Soft, non-tender, non-distended, positive bowel sounds  No masses, no organomegaly    No CVA tenderness   Extremities: Tophaceous gouty arthropathy of fingers and toes, right 4th finger with dry surgical dressing  Picture by orthopedics noted without any inflammation or purulence  Skin: Skin color pale, as above           Invasive Devices  Report    Peripheral Intravenous Line  Duration           Peripheral IV 22 Left;Ventral (anterior) Forearm 3 days                Labs, Imaging, & Other studies:   All pertinent labs were personally reviewed  Results from last 7 days   Lab Units 09/13/22  0521 09/12/22  0601 09/11/22  1656 09/11/22  0649   WBC Thousand/uL 5 77 5 33 5 76 5 18   HEMOGLOBIN g/dL 9 7* 9 6* 10 3* 10 0*   PLATELETS Thousands/uL 49* 43*  --  46*     Results from last 7 days   Lab Units 09/13/22  0521 09/12/22  0601 09/11/22  0649 09/10/22  0600 09/09/22  0431   SODIUM mmol/L 135 134* 134*   < > 131*   POTASSIUM mmol/L 4 1 4 6 4 8   < > 4 3   CHLORIDE mmol/L 104 106 106   < > 105   CO2 mmol/L 26 23 21   < > 21   BUN mg/dL 27* 28* 31*   < > 33*   CREATININE mg/dL 1 02 0 98 1 20   < > 2 13*   EGFR ml/min/1 73sq m 79 83 65   < > 32   CALCIUM mg/dL 8 9 8 9 8 9   < > 8 5   AST U/L  --  39  --   --  31   ALT U/L  --  35  --   --  33   ALK PHOS U/L  --  60  --   --  86    < > = values in this interval not displayed       Results from last 7 days   Lab Units 09/10/22  0837 09/10/22  0834   GRAM STAIN RESULT  No Polys or Bacteria seen No Polys or Bacteria seen

## 2022-09-13 NOTE — PROGRESS NOTES
[facility]  Progress Note - Sruthi Ramsey 1960, 62 y o  male MRN: [id number]  Unit/Bed#: [facility] 623-01 Encounter: [id number]  Primary Care Provider: Gadiel Hernandez MD   Date and time admitted to hospital: 9/8/2022  7:47 PM    * Tenosynovitis of right hand  Assessment & Plan  Pt presented with right hand tophi gout with swelling and pain   · Ortho following:  · MRI obtained - Destructive change of the distal aspect of the 4th digit middle phalanx and base of the 4th digit distal phalanx with adjacent joint effusion  Trace tenosynovitis of the 4th digit flexor tendon at level of base of 4th digit middle phalanx likely inflammatory  Infectious tenosynovitis is less likely given appearance   · S/p Bactrim as an outpatient without improvement -- wound culture from 8/31 with Staph aureus and operative culture from 9/10 also growing Staph aureus     · s/p debridement/wash out with hand surgery on 9/10  · On intravenous vancomycin day 4 of 28  · PICC line to be placed today   · Pain control  · Case management working on [facility] home intravenous antibiotics  Idiopathic chronic gout of multiple sites with tophus  Assessment & Plan  · Noted on the right hand   · Typically maintained on indomethacin and colchicine, now discontinued in setting of renal function   · Continue prednisone course x5 days  · Additional plan as above     Thrombocytopenia (HCC)  Assessment & Plan  POA, stable at 49 today  Unclear etiology  He does drink two glasses of wine daily, no known cirrhosis  · Pt had thrombocytopenia on labs from August of 2020, but appears to likely be related to acute illness requiring surgical resection of bowel at that time, platelet count then normalized   · Hematology following, HIV hepatitis panel negative  Reticulocyte, LDH normal   Labs do show B12 and folate deficiency, currently on supplementation    · Abdominal ultrasound pending  · Will need outpatient follow-up at [facility] with Dr Trang Jimenez    LEX (acute kidney injury) [facility])  Assessment & Plan  POA, Cr  2 18  · Per review of outpatient records in care everywhere, most recent Cr noted to be 0 7  · Likely in setting of volume depletion and dehydration from poor PO intake prior to admission  · Resolved with intravenous hydration    Hyponatremia-resolved as of 2022  Assessment & Plan  · Resolved with intravenous fluids    Controlled type 2 diabetes mellitus, without long-term current use of insulin (HCC)  Assessment & Plan  Hyperglycemia noted on labs   · Hgb A1c is 6 8   · Fingersticks while on steroids   · SSI    · Monitor   · Pt aware of need for f/u     Murmur  Assessment & Plan  · Faint Murmur noted on exam     · Echo without any significant valvular abnormalities, normal EF  VTE Pharmacologic Prophylaxis: VTE Score: 3 Moderate Risk (Score 3-4) - Pharmacological DVT Prophylaxis Contraindicated  Sequential Compression Devices Ordered  Patient Centered Rounds: I performed bedside rounds with nursing staff today  Discussions with Specialists or Other Care Team Provider: nursing, case management     Education and Discussions with Family / Patient: Patient declined call to   Time Spent for Care: 30 minutes  More than 50% of total time spent on counseling and coordination of care as described above  Current Length of Stay: 4 day(s)  Current Patient Status: Inpatient   Certification Statement: The patient will continue to require additional inpatient hospital stay due to abdominal US, monitoring blood counts, ID clearance, PICC placement, coordination of home iv abx  Discharge Plan: Anticipate discharge in 24-48 hrs to home with home services  Code Status: Level 1 - Full Code    Subjective:   Patient offers no acute complaints  Hand pain improving  No fevers or chills  Tolerating antibiotics  Agreeable for PICC line      Objective:     Vitals:   Temp (24hrs), Av 9 °F (36 6 °C), Min:97 5 °F (36 4 °C), Max:98 2 °F (36 8 °C)    Temp:  [97 5 °F (36 4 °C)-98 2 °F (36 8 °C)] 97 5 °F (36 4 °C)  HR:  [59-80] 80  Resp:  [16-18] 16  BP: (133-140)/(81-83) 133/83  SpO2:  [97 %-100 %] 100 %  Body mass index is 21 76 kg/m²  Input and Output Summary (last 24 hours): Intake/Output Summary (Last 24 hours) at 9/13/2022 1218  Last data filed at 9/13/2022 1010  Gross per 24 hour   Intake 440 ml   Output --   Net 440 ml       Physical Exam:   Physical Exam  Vitals and nursing note reviewed  Constitutional:       General: He is not in acute distress  Cardiovascular:      Rate and Rhythm: Normal rate  Heart sounds: Murmur heard  Pulmonary:      Breath sounds: Normal breath sounds  Abdominal:      General: Bowel sounds are normal       Palpations: Abdomen is soft  Tenderness: There is no abdominal tenderness  Musculoskeletal:         General: No swelling  Comments: Right hand with dressing clean dry and intact  Skin:     General: Skin is warm  Neurological:      Mental Status: He is alert and oriented to person, place, and time  Mental status is at baseline     Psychiatric:         Mood and Affect: Mood normal           Additional Data:     Labs:  Results from last 7 days   Lab Units 09/13/22  0521 09/12/22  0601   WBC Thousand/uL 5 77 5 33   HEMOGLOBIN g/dL 9 7* 9 6*   HEMATOCRIT % 27 6* 26 9*   PLATELETS Thousands/uL 49* 43*   NEUTROS PCT %  --  84*   LYMPHS PCT %  --  12*   MONOS PCT %  --  3*   EOS PCT %  --  0     Results from last 7 days   Lab Units 09/13/22  0521 09/12/22  0601   SODIUM mmol/L 135 134*   POTASSIUM mmol/L 4 1 4 6   CHLORIDE mmol/L 104 106   CO2 mmol/L 26 23   BUN mg/dL 27* 28*   CREATININE mg/dL 1 02 0 98   ANION GAP mmol/L 5 5   CALCIUM mg/dL 8 9 8 9   ALBUMIN g/dL  --  2 5*   TOTAL BILIRUBIN mg/dL  --  0 57   ALK PHOS U/L  --  60   ALT U/L  --  35   AST U/L  --  39   GLUCOSE RANDOM mg/dL 158* 173*     Results from last 7 days   Lab Units 09/09/22  0431   INR  1 13 Results from last 7 days   Lab Units 09/13/22  1109 09/13/22  0751 09/12/22  2034 09/12/22  1636 09/12/22  1404 09/12/22  1129 09/12/22  0852 09/11/22  2103 09/11/22  1711 09/11/22  1139 09/11/22  0742 09/10/22  2051   POC GLUCOSE mg/dl 183* 138 338* 262* 369* 258* 166* 354* 364* 317* 209* 378*     Results from last 7 days   Lab Units 09/09/22  0431   HEMOGLOBIN A1C % 6 8*           Lines/Drains:  Invasive Devices  Report    Peripheral Intravenous Line  Duration           Peripheral IV 09/09/22 Left;Ventral (anterior) Forearm 3 days                      Imaging: Reviewed radiology reports from this admission including: ECHO    Recent Cultures (last 7 days):   Results from last 7 days   Lab Units 09/10/22  0837 09/10/22  0834   GRAM STAIN RESULT  No Polys or Bacteria seen No Polys or Bacteria seen       Last 24 Hours Medication List:   Current Facility-Administered Medications   Medication Dose Route Frequency Provider Last Rate    acetaminophen  650 mg Oral Q6H PRN Yady Leong MD      aluminum-magnesium hydroxide-simethicone  30 mL Oral Q6H PRN Yady Leong MD      cyanocobalamin  1,000 mcg Intramuscular Daily Jose Beck MD      docusate sodium  100 mg Oral BID PRN Yady Leong MD      folic acid  1 mg Oral Daily Jose Beck MD      insulin lispro  1-5 Units Subcutaneous TID AC Abby Rollins PA-C      insulin lispro  1-5 Units Subcutaneous HS Abby Rollins PA-C      ondansetron  4 mg Intravenous Q6H PRN Meghna Chauhan MD      oxyCODONE  5 mg Oral Q4H PRN Yady Leong MD      sodium chloride  75 mL/hr Intravenous Continuous Meghna Chauhan MD Stopped (09/10/22 1449)    vancomycin  12 5 mg/kg Intravenous Q12H Carol Nath MD Stopped (09/13/22 1010)        Today, Patient Was Seen By: ZAID Villanueva    **Please Note: This note may have been constructed using a voice recognition system  **

## 2022-09-14 LAB
ANION GAP SERPL CALCULATED.3IONS-SCNC: 6 MMOL/L (ref 4–13)
ANISOCYTOSIS BLD QL SMEAR: PRESENT
BASOPHILS # BLD MANUAL: 0 THOUSAND/UL (ref 0–0.1)
BASOPHILS NFR MAR MANUAL: 0 % (ref 0–1)
BUN SERPL-MCNC: 26 MG/DL (ref 5–25)
CALCIUM SERPL-MCNC: 9.1 MG/DL (ref 8.3–10.1)
CHLORIDE SERPL-SCNC: 103 MMOL/L (ref 96–108)
CO2 SERPL-SCNC: 24 MMOL/L (ref 21–32)
CREAT SERPL-MCNC: 0.9 MG/DL (ref 0.6–1.3)
EOSINOPHIL # BLD MANUAL: 0 THOUSAND/UL (ref 0–0.4)
EOSINOPHIL NFR BLD MANUAL: 0 % (ref 0–6)
ERYTHROCYTE [DISTWIDTH] IN BLOOD BY AUTOMATED COUNT: 12.5 % (ref 11.6–15.1)
GFR SERPL CREATININE-BSD FRML MDRD: 92 ML/MIN/1.73SQ M
GLUCOSE SERPL-MCNC: 147 MG/DL (ref 65–140)
GLUCOSE SERPL-MCNC: 148 MG/DL (ref 65–140)
GLUCOSE SERPL-MCNC: 160 MG/DL (ref 65–140)
GLUCOSE SERPL-MCNC: 211 MG/DL (ref 65–140)
GLUCOSE SERPL-MCNC: 263 MG/DL (ref 65–140)
HCT VFR BLD AUTO: 29.4 % (ref 36.5–49.3)
HGB BLD-MCNC: 10.5 G/DL (ref 12–17)
LYMPHOCYTES # BLD AUTO: 1.21 THOUSAND/UL (ref 0.6–4.47)
LYMPHOCYTES # BLD AUTO: 20 % (ref 14–44)
MACROCYTES BLD QL AUTO: PRESENT
MCH RBC QN AUTO: 41.7 PG (ref 26.8–34.3)
MCHC RBC AUTO-ENTMCNC: 35.7 G/DL (ref 31.4–37.4)
MCV RBC AUTO: 117 FL (ref 82–98)
MONOCYTES # BLD AUTO: 0.36 THOUSAND/UL (ref 0–1.22)
MONOCYTES NFR BLD: 6 % (ref 4–12)
NEUTROPHILS # BLD MANUAL: 4.31 THOUSAND/UL (ref 1.85–7.62)
NEUTS BAND NFR BLD MANUAL: 1 % (ref 0–8)
NEUTS SEG NFR BLD AUTO: 70 % (ref 43–75)
NRBC BLD AUTO-RTO: 1 /100 WBC (ref 0–2)
PLATELET # BLD AUTO: 57 THOUSANDS/UL (ref 149–390)
PLATELET BLD QL SMEAR: ADEQUATE
PMV BLD AUTO: 10.8 FL (ref 8.9–12.7)
POLYCHROMASIA BLD QL SMEAR: PRESENT
POTASSIUM SERPL-SCNC: 3.9 MMOL/L (ref 3.5–5.3)
RBC # BLD AUTO: 2.52 MILLION/UL (ref 3.88–5.62)
RBC MORPH BLD: PRESENT
SODIUM SERPL-SCNC: 133 MMOL/L (ref 135–147)
VARIANT LYMPHS # BLD AUTO: 3 %
WBC # BLD AUTO: 6.07 THOUSAND/UL (ref 4.31–10.16)

## 2022-09-14 PROCEDURE — 85027 COMPLETE CBC AUTOMATED: CPT | Performed by: NURSE PRACTITIONER

## 2022-09-14 PROCEDURE — 05HC33Z INSERTION OF INFUSION DEVICE INTO LEFT BASILIC VEIN, PERCUTANEOUS APPROACH: ICD-10-PCS | Performed by: PHYSICIAN ASSISTANT

## 2022-09-14 PROCEDURE — 99231 SBSQ HOSP IP/OBS SF/LOW 25: CPT | Performed by: INTERNAL MEDICINE

## 2022-09-14 PROCEDURE — C1751 CATH, INF, PER/CENT/MIDLINE: HCPCS

## 2022-09-14 PROCEDURE — 85007 BL SMEAR W/DIFF WBC COUNT: CPT | Performed by: NURSE PRACTITIONER

## 2022-09-14 PROCEDURE — 82948 REAGENT STRIP/BLOOD GLUCOSE: CPT

## 2022-09-14 PROCEDURE — 36569 INSJ PICC 5 YR+ W/O IMAGING: CPT

## 2022-09-14 PROCEDURE — 99232 SBSQ HOSP IP/OBS MODERATE 35: CPT | Performed by: NURSE PRACTITIONER

## 2022-09-14 PROCEDURE — 80048 BASIC METABOLIC PNL TOTAL CA: CPT | Performed by: NURSE PRACTITIONER

## 2022-09-14 PROCEDURE — NC001 PR NO CHARGE: Performed by: SURGERY

## 2022-09-14 RX ORDER — COLCHICINE 0.6 MG/1
0.6 TABLET ORAL DAILY
Status: DISCONTINUED | OUTPATIENT
Start: 2022-09-15 | End: 2022-09-16 | Stop reason: HOSPADM

## 2022-09-14 RX ADMIN — INSULIN LISPRO 1 UNITS: 100 INJECTION, SOLUTION INTRAVENOUS; SUBCUTANEOUS at 12:09

## 2022-09-14 RX ADMIN — CYANOCOBALAMIN 1000 MCG: 1000 INJECTION, SOLUTION INTRAMUSCULAR; SUBCUTANEOUS at 09:15

## 2022-09-14 RX ADMIN — INSULIN LISPRO 2 UNITS: 100 INJECTION, SOLUTION INTRAVENOUS; SUBCUTANEOUS at 21:10

## 2022-09-14 RX ADMIN — FOLIC ACID 1 MG: 1 TABLET ORAL at 09:15

## 2022-09-14 RX ADMIN — VANCOMYCIN HYDROCHLORIDE 1750 MG: 10 INJECTION, POWDER, LYOPHILIZED, FOR SOLUTION INTRAVENOUS at 09:12

## 2022-09-14 NOTE — PROGRESS NOTES
Orthopedics   Zaynab lOmos 62 y o  male MRN: [id number]  Unit/Bed#: [facility] 623-01      Subjective:  62 y o male post operative day 5 right RF I&D  Doing well  Pain controlled   He wants to go home    Labs:  0   Lab Value Date/Time    HCT 29 4 (L) 09/14/2022 0531    HCT 27 6 (L) 09/13/2022 0521    HCT 26 9 (L) 09/12/2022 0601    HGB 10 5 (L) 09/14/2022 0531    HGB 9 7 (L) 09/13/2022 0521    HGB 9 6 (L) 09/12/2022 0601    INR 1 13 09/09/2022 0431    WBC 6 07 09/14/2022 0531    WBC 5 77 09/13/2022 0521    WBC 5 33 09/12/2022 0601    ESR 57 (H) 09/08/2022 2032    CRP 39 1 (H) 09/08/2022 2032       Meds:    Current Facility-Administered Medications:     acetaminophen (TYLENOL) tablet 650 mg, 650 mg, Oral, Q6H PRN, Marvin Leong MD, 650 mg at 09/09/22 1814    aluminum-magnesium hydroxide-simethicone (MYLANTA) oral suspension 30 mL, 30 mL, Oral, Q6H PRN, Marvin Leong MD    colchicine (COLCRYS) tablet 0 6 mg, 0 6 mg, Oral, Daily, ZAID Waterman    docusate sodium (COLACE) capsule 100 mg, 100 mg, Oral, BID PRN, Marvin Leong MD    folic acid (FOLVITE) tablet 1 mg, 1 mg, Oral, Daily, Elin Newberry MD, 1 mg at 09/14/22 0915    insulin lispro (HumaLOG) 100 units/mL subcutaneous injection 1-5 Units, 1-5 Units, Subcutaneous, TID AC, 1 Units at 09/14/22 1209 **AND** Fingerstick Glucose (POCT), , , TID AC, Abby Rollins PA-C    insulin lispro (HumaLOG) 100 units/mL subcutaneous injection 1-5 Units, 1-5 Units, Subcutaneous, HS, Abby Rollins PA-C, 2 Units at 09/14/22 2110    ondansetron (ZOFRAN) injection 4 mg, 4 mg, Intravenous, Q6H PRN, Marvin Leong MD    oxyCODONE (ROXICODONE) IR tablet 5 mg, 5 mg, Oral, Q4H PRN, Marvin Leong MD, 5 mg at 09/13/22 2220    vancomycin (VANCOCIN) 1,750 mg in sodium chloride 0 9 % 500 mL IVPB, 1,750 mg, Intravenous, Q24H, Diann Bella MD, Last Rate: 250 mL/hr at 09/14/22 0912, 1,750 mg at 09/14/22 0912    Blood Culture:   No results found for: BLOODCX    Wound Culture:   Lab Results   Component Value Date    WOUNDCULT 3+ Growth of Staphylococcus aureus (A) 08/29/2022       Ins and Outs:  I/O last 24 hours: In: 222 [P O :222]  Out: -           Physical Exam:  Vitals:    09/15/22 0001   BP: 101/71   Pulse: 70   Resp: 18   Temp: 98 8 °F (37 1 °C)   SpO2: 99%     right Upper Extremity extremity:  · Incision C/D/I, no erythema  · Swelling of the long and ring fingers  · Digits soft and compressible  · tender over the PIPJ of ring and long fingers, not tender on the flexor tendon  · Able to flex extend at the PIPJ  · Motor and Sensation intact to light touch median ulnar and radial nerves  Assessment: 61 y o male post operative day 5 right ring and long finger I&D  Tissue and bone cx grew MSSA    Patient is improved since surgery    Plan:  · Antibiotics per ID - vanco x 28d  · Dc when vna set up  · DVT prophylaxis with SCDs  · Analgesics  · PT/OT  · Dispo: Ortho will follow  · Will continue to assess for acute blood loss anemia    Lisseth Rahman MD

## 2022-09-14 NOTE — CASE MANAGEMENT
Case Management Discharge Planning Note    Patient name Faviola Mozambican  Location [address] 623/PPHP 342-66 MRN [id number]  : 1960 Date 2022       Current Admission Date: 2022  Current Admission Diagnosis:Tenosynovitis of right hand   Patient Active Problem List    Diagnosis Date Noted    Murmur 2022    Idiopathic chronic gout of multiple sites with tophus 2022    LEX (acute kidney injury) ([facility] ) 2022    Controlled type 2 diabetes mellitus, without long-term current use of insulin ([facility] ) 2022    Thrombocytopenia ([facility] ) 2022    Tenosynovitis of right hand 2022      LOS (days): 5  Geometric Mean LOS (GMLOS) (days): 3 20  Days to GMLOS:-2 4     OBJECTIVE:  Risk of Unplanned Readmission Score: 7 2         Current admission status: Inpatient   Preferred Pharmacy:   [facility], [address] [facility] [address] [facility] [address]  [address] [place] [id number]  Phone: [phone] Fax: [phone]    Primary Care Provider: Dana Wetzel MD    Primary Insurance: MEDICARE  Secondary Insurance: [address] [place] [address] DETAILS: [facility] able to accept patient for home IV antibiotics  Patient in agreement, no copay, 100% covered  Message sent via [address] to [facility], able to do [facility] Friday AM   Plan is for patient to be discharged tomorrow after antibiotic dose, patient in agreement  States that his son will help with antibiotic therapy if needed    IMM reviewed with patient, patient agrees with discharge determination

## 2022-09-14 NOTE — PROCEDURES
Insert PICC line    Date/Time: 9/14/2022 9:00 AM  Performed by: Demarco Chavez RN  Authorized by: Shukri Mackenzie MD     Patient location:  Bedside  Other Assisting Provider: Yes (comment) Cristina PRECIADO , LUISITO)    Consent:     Consent obtained:  Written (Obtained by provider)    Consent given by:  Patient    Risks discussed:  Arterial puncture, bleeding, infection, incorrect placement, nerve damage and pneumothorax (Discussed by provider)    Alternatives discussed: Discussed by provider  Universal protocol:     Procedure explained and questions answered to patient or proxy's satisfaction: yes      Relevant documents present and verified: yes      Test results available and properly labeled: yes      Radiology Images displayed and confirmed  If images not available, report reviewed: yes      Required blood products, implants, devices, and special equipment available: yes      Site/side marked: yes      Immediately prior to procedure, a time out was called: yes      Patient identity confirmed:  Verbally with patient, arm band, provided demographic data and hospital-assigned identification number  Pre-procedure details:     Hand hygiene: Hand hygiene performed prior to insertion      Sterile barrier technique: All elements of maximal sterile technique followed      Skin preparation:  ChloraPrep    Skin preparation agent: Skin preparation agent completely dried prior to procedure    Indications:     PICC line indications: long term antibiotics    Sedation:     Sedation type: None  Anesthesia (see MAR for exact dosages):      Anesthesia method:  Local infiltration    Local anesthetic:  Lidocaine 1% w/o epi (3 mL administered)  Procedure details:     Location:  Basilic    Vessel type: vein      Laterality:  Left    Site selection rationale:  Largest, most patent vessel of LUE    Approach: percutaneous technique used      Patient position:  Flat    Procedural supplies:  Single lumen    Catheter size:  4 Fr    Landmarks identified: yes      Ultrasound guidance: yes      Ultrasound image availability:  Not saved (Catheter vessel occupancy of 29%  Unable to save image to Epic )    Sterile ultrasound techniques: Sterile gel and sterile probe covers were used      Number of attempts:  1    Successful placement: yes      Vessel of catheter tip end:  Sherlock 3CG confirmed (OK to use PICC  Sherlock 3CG confirmed placement  Results saved to chart )    Total catheter length (cm):  50    Catheter out on skin (cm):  0    Max flow rate:  999 ml/hr    Arm circumference:  29  Post-procedure details:     Post-procedure:  Dressing applied and securement device placed    Assessment:  Blood return through all ports and free fluid flow    Post-procedure complications: none      Patient tolerance of procedure:   Tolerated well, no immediate complications

## 2022-09-14 NOTE — RESTORATIVE TECHNICIAN NOTE
Restorative Technician Note      Patient Name: Geronimo Spencer     [facility] Tech Visit Date: 9/14/2022  Note Type: Mobility  Patient Position Upon Consult: Supine    Pt declined mobility at this time; requested to mobilize tomorrow instead      Sundeep Goodson Restorative Technician

## 2022-09-14 NOTE — ASSESSMENT & PLAN NOTE
Hyperglycemia noted on labs  A1c 6 8  Not on Rx at baseline  · Was on prednisone for gout flare, now off    · Continue SSI  · Recommend close outpatient PCP follow-up

## 2022-09-14 NOTE — PROGRESS NOTES
Vancomycin Assessment    Jeff Walls is a 62 y o  male who is currently receiving vancomycin 1 g IV q12h for osteomyelitis     Relevant clinical data and objective history reviewed:  Creatinine   Date Value Ref Range Status   09/13/2022 1 02 0 60 - 1 30 mg/dL Final     Comment:     Standardized to IDMS reference method   09/12/2022 0 98 0 60 - 1 30 mg/dL Final     Comment:     Standardized to IDMS reference method   09/11/2022 1 20 0 60 - 1 30 mg/dL Final     Comment:     Standardized to IDMS reference method     Vancomycin Rm   Date Value Ref Range Status   09/11/2022 11 1 10 0 - 20 0 ug/mL Final     /77   Pulse 70   Temp 98 7 °F (37 1 °C)   Resp 16   Ht 5' 11" (1 803 m)   Wt 70 8 kg (156 lb)   SpO2 100%   BMI 21 76 kg/m²   I/O last 3 completed shifts: In: 680 [P O :480; IV Piggyback:200]  Out: -   Lab Results   Component Value Date/Time    BUN 27 (H) 09/13/2022 05:21 AM    WBC 5 77 09/13/2022 05:21 AM    HGB 9 7 (L) 09/13/2022 05:21 AM    HCT 27 6 (L) 09/13/2022 05:21 AM     (H) 09/13/2022 05:21 AM    PLT 49 (LL) 09/13/2022 05:21 AM     Temp Readings from Last 3 Encounters:   09/13/22 98 7 °F (37 1 °C)   09/08/22 98 9 °F (37 2 °C)   08/29/22 98 7 °F (37 1 °C)     Vancomycin Days of Therapy: 4    Assessment/Plan  The patient is currently on vancomycin utilizing scheduled dosing  Baseline risks associated with therapy include: concomitant nephrotoxic medications  The patient is receiving 1 g IV q12h with the most recent vancomycin level being at steady-state and supratherapeutic based on a goal of 15-20 (appropriate for most indications) ; therefore, after clinical evaluation will be changed to 1750 mg IV q24h   Pharmacy will continue to follow closely for s/sx of nephrotoxicity, infusion reactions, and appropriateness of therapy  BMP and CBC will be ordered per protocol  Plan for trough as patient approaches steady state, 30 min before vanco dose due at 0900 on 9-17-22   Pharmacy will continue to follow the patients culture results and clinical progress daily      Gage Low, Pharmacist

## 2022-09-14 NOTE — ASSESSMENT & PLAN NOTE
POA   Unclear etiology  He does drink two glasses of wine daily, no known cirrhosis  · Pt had thrombocytopenia on labs from August of 2020, but appears to likely be related to acute illness requiring surgical resection of bowel at that time, platelet count then normalized   · Hematology following, HIV hepatitis panel negative  Reticulocyte, LDH normal   Labs do show B12 and folate deficiency, currently on supplementation  · Abdominal ultrasound negative other than gallstones  · Stable for discharge from a hematology standpoint  Plan to follow-up with [facility], Dr Reilly Rust  Continue folic acid PO on d/c, no b12

## 2022-09-14 NOTE — PROGRESS NOTES
Orthopedics   Juanna Ache 62 y o  male MRN: [id number]  Unit/Bed#: [facility] 623-01      Subjective:  62 y o male post operative day 4 right RF I&D  Doing well  Pain controlled      Labs:  0   Lab Value Date/Time    HCT 27 6 (L) 09/13/2022 0521    HCT 26 9 (L) 09/12/2022 0601    HCT 29 5 (L) 09/11/2022 1656    HGB 9 7 (L) 09/13/2022 0521    HGB 9 6 (L) 09/12/2022 0601    HGB 10 3 (L) 09/11/2022 1656    INR 1 13 09/09/2022 0431    WBC 5 77 09/13/2022 0521    WBC 5 33 09/12/2022 0601    WBC 5 76 09/11/2022 1656    ESR 57 (H) 09/08/2022 2032    CRP 39 1 (H) 09/08/2022 2032       Meds:    Current Facility-Administered Medications:     acetaminophen (TYLENOL) tablet 650 mg, 650 mg, Oral, Q6H PRN, Andrew Leong MD, 650 mg at 09/09/22 1814    aluminum-magnesium hydroxide-simethicone (MYLANTA) oral suspension 30 mL, 30 mL, Oral, Q6H PRN, Andrew Leong MD    cyanocobalamin injection 1,000 mcg, 1,000 mcg, Intramuscular, Daily, Sherri Chery MD, 1,000 mcg at 09/13/22 0902    docusate sodium (COLACE) capsule 100 mg, 100 mg, Oral, BID PRN, Andrew Leong MD    folic acid (FOLVITE) tablet 1 mg, 1 mg, Oral, Daily, Sherri Chery MD, 1 mg at 09/13/22 0902    insulin lispro (HumaLOG) 100 units/mL subcutaneous injection 1-5 Units, 1-5 Units, Subcutaneous, TID AC, 3 Units at 09/13/22 1844 **AND** Fingerstick Glucose (POCT), , , TID AC, Abby Rollins PA-C    insulin lispro (HumaLOG) 100 units/mL subcutaneous injection 1-5 Units, 1-5 Units, Subcutaneous, HS, Abby Rollins PA-C, 3 Units at 09/13/22 2159    ondansetron (ZOFRAN) injection 4 mg, 4 mg, Intravenous, Q6H PRN, Andrew Leong MD    oxyCODONE (ROXICODONE) IR tablet 5 mg, 5 mg, Oral, Q4H PRN, Andrew Leong MD, 5 mg at 09/13/22 2220    vancomycin (VANCOCIN) 1,750 mg in sodium chloride 0 9 % 500 mL IVPB, 1,750 mg, Intravenous, Q24H, Olu Tay MD    Blood Culture:   No results found for: BLOODCX    Wound Culture:   Lab Results   Component Value Date WOUNDCULT 3+ Growth of Staphylococcus aureus (A) 08/29/2022       Ins and Outs:  I/O last 24 hours: In: 440 [P O :240; IV Piggyback:200]  Out: -           Physical Exam:  Vitals:    09/14/22 0042   BP: 135/82   Pulse: 62   Resp: 17   Temp: 98 3 °F (36 8 °C)   SpO2: 100%     right Upper Extremity extremity:  Incision C/D/I, no erythema  Swelling of the long and ring fingers  Digits soft and compressible  tender over the PIPJ of ring and long fingers, not tender on the flexor tendon  Able to flex extend at the PIPJ  Motor and Sensation intact to light touch median ulnar and radial nerves  Assessment: 61 y o male post operative day 4 right ring and long finger I&D  Tissue and bone cx grew MSSA    Patient is improved since surgery    Plan:  Antibiotics per ID - vanco x 28d  picc line today  Dc when vna set up  DVT prophylaxis with SCDs  Analgesics  PT/OT  Dispo: Ortho will follow  Will continue to assess for acute blood loss anemia    Meghna Chauhan MD

## 2022-09-14 NOTE — ASSESSMENT & PLAN NOTE
· Noted on the right hand   · Typically maintained on indomethacin and colchicine which was held secondary to a KI  · Completed course of prednisone  · Renal function has been stable, can resume colchicine    · Additional plan as above

## 2022-09-14 NOTE — ASSESSMENT & PLAN NOTE
Pt presented with right hand tophi gout with swelling and pain   · Ortho following:  · MRI obtained - Destructive change of the distal aspect of the 4th digit middle phalanx and base of the 4th digit distal phalanx with adjacent joint effusion  Trace tenosynovitis of the 4th digit flexor tendon at level of base of 4th digit middle phalanx likely inflammatory  Infectious tenosynovitis is less likely given appearance   · S/p Bactrim as an outpatient without improvement -- wound culture from 8/31 with Staph aureus and operative culture from 9/10 also growing Staph aureus     · s/p debridement/wash out with hand surgery on 9/10  · On intravenous vancomycin day 5 of 28  · PICC line placed 9/13   · Pain control  · Case management working on VNA home intravenous antibiotics  Hopeful for discharge tomorrow if coordinated

## 2022-09-14 NOTE — PROGRESS NOTES
Vancomycin IV Pharmacy-to-Dose Consultation    Abel Landers is a 62 y o  male who is currently receiving Vancomycin IV for treatment of right hand osteomyelitis with management by the Pharmacy Consult service  Assessment/Plan:  The patient was reviewed  Renal function is stable and no signs or symptoms of nephrotoxicity and/or infusion reactions were documented in the chart  Patient's tissue culture is growing MSSA  Per ID recommendations, will continue the vancomycin  Based on todays assessment, continue current vancomycin (day # 5) dosing of 1750 mg IV q24h, with a plan for trough to be drawn at 0900 on 9/17  We will continue to follow the patients culture results and clinical progress daily      Jcarlos Gambino, Pharmacist

## 2022-09-14 NOTE — RESTORATIVE TECHNICIAN NOTE
Restorative Technician Note      Patient Name: Abel Landers     Restorative Tech Visit Date: 9/14/2022  Note Type: Mobility  Patient Position Upon Consult: Supine  Activity Performed: Ambulated  Assistive Device: Other (Comment) (no AD)  Patient Position at End of Consult: Supine;  All needs within reach    Abel Collazo Restorative Technician

## 2022-09-14 NOTE — PROGRESS NOTES
Progress Note - Infectious Disease   Evaristo Goldman 62 y o  male MRN: [id number]  Unit/Bed#: [facility] 623-01 Encounter: [id number]      Impression:  1  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 with secondary Staphylococcus aureus (MSSA) osteomyelitis S/P I&D day 4 POD  2  Type 2 DM     Recommendations:  Patient is afebrile with normal WBC count  Orthopedic picture of wound noted as well as description  No evidence of purulence or inflammation  1  Orthopedic wound description and picture noted   No evidence of inflammation  2  Continue vancomycin 1750 mg q 24 hours IV with further dosing by pharmacy  Anticipating a 28 day course  3  Prescriptions for outpatient vancomycin IV per 23 more days as well as lab work for weekly CBC, diff, BMP, vancomycin trough, x4 given to   4  If patient is discharged he will be followed by orthopedic service as well as ID office     Antibiotics:  1  Vancomycin 1750 mg IV q 24 hours IV, day 5 Rx of 28    Subjective: The patient has no complaints  Denies fevers, chills, or sweats  Denies nausea, vomiting, or diarrhea  Objective:  Vitals:  Temp:  [98 2 °F (36 8 °C)-98 7 °F (37 1 °C)] 98 2 °F (36 8 °C)  HR:  [62-70] 65  Resp:  [16-17] 16  BP: (107-135)/(76-82) 107/76  SpO2:  [100 %] 100 %  Temp (24hrs), Av 4 °F (36 9 °C), Min:98 2 °F (36 8 °C), Max:98 7 °F (37 1 °C)  Current: Temperature: 98 2 °F (36 8 °C)    Physical Exam:     General Appearance:  Alert, nontoxic, no acute distress  Throat: Oropharynx moist without lesions  Lips, mucosa, and tongue normal   Neck: Supple, symmetrical, trachea midline, no adenopathy,  no tenderness/mass/nodules   Lungs:   Clear to auscultation bilaterally, no audible wheezes, rhonchi or rales; respirations unlabored   Heart:  Regular rate and rhythm, S1, S2 normal, no murmur, rub or gallop   Abdomen:   Soft, non-tender, non-distended, positive bowel sounds    No masses, no organomegaly    No CVA tenderness   Extremities: Tophaceous gouty arthropathy of fingers and toes, right 4th finger with dry surgical dressing  Picture by orthopedics 9/14 noted without any inflammation or purulence  Skin: Skin color pale, as above  Invasive Devices  Report    Peripherally Inserted Central Catheter Line  Duration           PICC Line 21/54/14 Left Basilic <1 day                Labs, Imaging, & Other studies:   All pertinent labs were personally reviewed  Results from last 7 days   Lab Units 09/14/22  0531 09/13/22  0521 09/12/22  0601   WBC Thousand/uL 6 07 5 77 5 33   HEMOGLOBIN g/dL 10 5* 9 7* 9 6*   PLATELETS Thousands/uL 57* 49* 43*     Results from last 7 days   Lab Units 09/14/22  0531 09/13/22  0521 09/12/22  0601 09/10/22  0600 09/09/22  0431   SODIUM mmol/L 133* 135 134*   < > 131*   POTASSIUM mmol/L 3 9 4 1 4 6   < > 4 3   CHLORIDE mmol/L 103 104 106   < > 105   CO2 mmol/L 24 26 23   < > 21   BUN mg/dL 26* 27* 28*   < > 33*   CREATININE mg/dL 0 90 1 02 0 98   < > 2 13*   EGFR ml/min/1 73sq m 92 79 83   < > 32   CALCIUM mg/dL 9 1 8 9 8 9   < > 8 5   AST U/L  --   --  39  --  31   ALT U/L  --   --  35  --  33   ALK PHOS U/L  --   --  60  --  86    < > = values in this interval not displayed       Results from last 7 days   Lab Units 09/10/22  0837 09/10/22  0834   GRAM STAIN RESULT  No Polys or Bacteria seen No Polys or Bacteria seen

## 2022-09-14 NOTE — CASE MANAGEMENT
Case Management Discharge Planning Note    Patient name Regina Baldwin  Location [address] [address] [facility] 623/[facility] 158-17 MRN [id number]  : 1960 Date 2022       Current Admission Date: 2022  Current Admission Diagnosis:Tenosynovitis of right hand   Patient Active Problem List    Diagnosis Date Noted    Murmur 2022    Idiopathic chronic gout of multiple sites with tophus 2022    LEX (acute kidney injury) ([facility] [address] ) 2022    Controlled type 2 diabetes mellitus, without long-term current use of insulin ([facility] [address] ) 2022    [facility] 20[facility]    Tenosynovitis of right hand 2022      LOS (days): 5  Geometric Mean LOS (GMLOS) (days): 3 20  Days to GMLOS:-2 2     OBJECTIVE:  Risk of Unplanned Readmission Score: 7 2         Current admission status: Inpatient   Preferred Pharmacy:   [facility] [address] [facility],[place] [address]  [address] [place] [address]  Phone: [phone] Fax: [phone]    Primary Care Provider: Chioma Agarwal MD    Primary Insurance: MEDICARE  Secondary Insurance: [address] [place] [address]    DISCHARGE DETAILS:    Discharge planning discussed with[de-identified] Patient  Freedom of Choice: Yes  Comments - Freedom of Choice: Discussed FOC  CM contacted family/caregiver?: No- see comments (Declined)  Were Treatment Team discharge recommendations reviewed with patient/caregiver?: Yes  Did patient/caregiver verbalize understanding of patient care needs?: Yes  Were patient/caregiver advised of the risks associated with not following Treatment Team discharge recommendations?: Yes    Requested  [address]         Is the patient interested in [address] at discharge?: Yes  [address] requested[de-identified] [address] Name[de-identified]  [facility] [address] Provider[de-identified] PCP  Home Health Services Needed[de-identified] Administration of IV, IM or SC Medications  Homebound Criteria Met[de-identified] Requires the Assistance of Another Person for Safe Ambulation or to Leave the Home, Uses an Assist Device (i e  cane, walker, etc)  Supporting Clincal Findings[de-identified] Limited Endurance, Fatigues Easliy in [employer]  Other Referral/Resources/Interventions Provided:  Interventions: Home Infusion  Referral Comments: [facility] able to accept for home infusion support, patient in agreement  Referral sent via [facility] to [facility] pharmacy for pricing, will follow up   Patient in agreement

## 2022-09-14 NOTE — PROGRESS NOTES
[address] [facility]  Progress Note - Ruiz Mace 1960, 62 y o  male MRN: [id number]  Unit/Bed#: [facility] 623-01 Encounter: [id number]  Primary Care Provider: Emma Kline MD   Date and time admitted to hospital: 9/8/2022  7:47 PM    * Tenosynovitis of right hand  Assessment & Plan  Pt presented with right hand tophi gout with swelling and pain   · Ortho following:  · MRI obtained - Destructive change of the distal aspect of the 4th digit middle phalanx and base of the 4th digit distal phalanx with adjacent joint effusion  Trace tenosynovitis of the 4th digit flexor tendon at level of base of 4th digit middle phalanx likely inflammatory  Infectious tenosynovitis is less likely given appearance   · S/p Bactrim as an outpatient without improvement -- wound culture from 8/31 with Staph aureus and operative culture from 9/10 also growing Staph aureus     · s/p debridement/wash out with hand surgery on 9/10  · On intravenous vancomycin day 5 of 28  · PICC line placed 9/13   · Pain control  · Case management working on VNA home intravenous antibiotics  Hopeful for discharge tomorrow if coordinated  Idiopathic chronic gout of multiple sites with tophus  Assessment & Plan  · Noted on the right hand   · Typically maintained on indomethacin and colchicine which was held secondary to a KI  · Completed course of prednisone  · Renal function has been stable, can resume colchicine  · Additional plan as above     Thrombocytopenia ([facility] [address] )  Assessment & Plan  POA  Unclear etiology  He does drink two glasses of wine daily, no known cirrhosis  · Pt had thrombocytopenia on labs from August of 2020, but appears to likely be related to acute illness requiring surgical resection of bowel at that time, platelet count then normalized   · Hematology following, HIV hepatitis panel negative  Reticulocyte, LDH normal   Labs do show B12 and folate deficiency, currently on supplementation    · Abdominal ultrasound negative other than gallstones  · Stable for discharge from a hematology standpoint  Plan to follow-up with [facility] Wilson, Dr Salcedo Deyanira  Continue folic acid PO on d/c, no b12  LEX [facility]acute [facility]) ([facility] )  Assessment & Plan  POA, Cr  2 18  · Per review of outpatient records in care everywhere, most recent Cr noted to be 0 7  · Likely in setting of volume depletion and dehydration from poor PO intake prior to admission  · Resolved with intravenous hydration    Controlled type 2 diabetes mellitus, without long-term current use of insulin ([facility] )  Assessment & Plan  Hyperglycemia noted on labs  A1c 6 8  Not on Rx at baseline  · Was on prednisone for gout flare, now off  · Continue SSI  · Recommend close outpatient PCP follow-up    Murmur  Assessment & Plan  · Faint Murmur noted on exam     · Echo without any significant valvular abnormalities, normal EF  VTE Pharmacologic Prophylaxis: VTE Score: 3 Moderate Risk (Score 3-4) - Pharmacological DVT Prophylaxis Contraindicated  Sequential Compression Devices Ordered  Patient Centered Rounds: I performed bedside rounds with nursing staff today  Discussions with Specialists or Other Care Team Provider: nursing, case management, Dr Quiana Rosas regarding scripts     Education and Discussions with Family / Patient: Patient  Time Spent for Care: 30 minutes  More than 50% of total time spent on counseling and coordination of care as described above  Current Length of Stay: 5 day(s)  Current Patient Status: Inpatient   Certification Statement: The patient will continue to require additional inpatient hospital stay due to pending coordination of home iv abx  Discharge Plan: Anticipate discharge tomorrow to home with home services  Code Status: Level 1 - Full Code    Subjective:   Patient offers no acute complaints  Pain controlled      Objective:     Vitals:   Temp (24hrs), Av 4 °F (36 9 °C), Min:98 2 °F (36 8 °C), Max:98 7 °F (37 1 °C)    Temp: [98 2 °F (36 8 °C)-98 7 °F (37 1 °C)] 98 2 °F (36 8 °C)  HR:  [62-70] 65  Resp:  [16-17] 16  BP: (107-135)/(76-82) 107/76  SpO2:  [100 %] 100 %  Body mass index is 21 76 kg/m²  Input and Output Summary (last 24 hours):   No intake or output data in the 24 hours ending 09/14/22 1109    Physical Exam:   Physical Exam  Vitals and nursing note reviewed  Cardiovascular:      Rate and Rhythm: Normal rate  Heart sounds: Murmur heard  Pulmonary:      Breath sounds: Normal breath sounds  Abdominal:      Tenderness: There is no abdominal tenderness  Musculoskeletal:         General: No swelling  Comments: Right hand Erick noted   Skin:     General: Skin is warm  Findings: Bruising present  Neurological:      Mental Status: He is oriented to person, place, and time  Mental status is at baseline  Psychiatric:         Mood and Affect: Mood normal           Additional Data:     Labs:  Results from last 7 days   Lab Units 09/14/22 0531 09/13/22 0521 09/12/22  0601   WBC Thousand/uL 6 07   < > 5 33   HEMOGLOBIN g/dL 10 5*   < > 9 6*   HEMATOCRIT % 29 4*   < > 26 9*   PLATELETS Thousands/uL 57*   < > 43*   BANDS PCT % 1  --   --    NEUTROS PCT %  --   --  84*   LYMPHS PCT %  --   --  12*   LYMPHO PCT % 20  --   --    MONOS PCT %  --   --  3*   MONO PCT % 6  --   --    EOS PCT % 0  --  0    < > = values in this interval not displayed  Results from last 7 days   Lab Units 09/14/22 0531 09/13/22  0521 09/12/22  0601   SODIUM mmol/L 133*   < > 134*   POTASSIUM mmol/L 3 9   < > 4 6   CHLORIDE mmol/L 103   < > 106   CO2 mmol/L 24   < > 23   BUN mg/dL 26*   < > 28*   CREATININE mg/dL 0 90   < > 0 98   ANION GAP mmol/L 6   < > 5   CALCIUM mg/dL 9 1   < > 8 9   ALBUMIN g/dL  --   --  2 5*   TOTAL BILIRUBIN mg/dL  --   --  0 57   ALK PHOS U/L  --   --  60   ALT U/L  --   --  35   AST U/L  --   --  39   GLUCOSE RANDOM mg/dL 160*   < > 173*    < > = values in this interval not displayed       Results from last 7 days   Lab Units 09/09/22  0431   INR  1 13     Results from last 7 days   Lab Units 09/14/22  0739 09/13/22  2112 09/13/22  1702 09/13/22  1109 09/13/22  0751 09/12/22  2034 09/12/22  1636 09/12/22  1404 09/12/22  1129 09/12/22  0852 09/11/22  2103 09/11/22  1711   POC GLUCOSE mg/dl 148* 362* 331* 183* 138 338* 262* 369* 258* 166* 354* 364*     Results from last 7 days   Lab Units 09/09/22  0431   HEMOGLOBIN A1C % 6 8*           Lines/Drains:  Invasive Devices  Report    Peripherally Inserted Central Catheter Line  Duration           PICC Line 28/19/55 Left Basilic <1 day                Central Line:  Goal for removal: N/A - Discharging with PICC for IV ABX/medications             Imaging: Reviewed radiology reports from this admission including: ultrasound(s)    Recent Cultures (last 7 days):   Results from last 7 days   Lab Units 09/10/22  0837 09/10/22  0834   GRAM STAIN RESULT  No Polys or Bacteria seen No Polys or Bacteria seen       Last 24 Hours Medication List:   Current Facility-Administered Medications   Medication Dose Route Frequency Provider Last Rate    acetaminophen  650 mg Oral Q6H PRN Arlen Leong MD      aluminum-magnesium hydroxide-simethicone  30 mL Oral Q6H PRN Arlen Leong MD      [START ON 9/15/2022] colchicine  0 6 mg Oral Daily ZAID Waterman      docusate sodium  100 mg Oral BID PRN Arlen Leong MD      folic acid  1 mg Oral Daily Cass Flores MD      insulin lispro  1-5 Units Subcutaneous TID AC Abby oRllins PA-C      insulin lispro  1-5 Units Subcutaneous HS Abby Rollins PA-C      ondansetron  4 mg Intravenous Q6H PRN Melita Linares MD      oxyCODONE  5 mg Oral Q4H PRN Alren Leong MD      vancomycin  1,750 mg Intravenous Q24H Lay Bender MD 1,750 mg (09/14/22 0912)        Today, Patient Was Seen By: ZAID Bowser    **Please Note: This note may have been constructed using a voice recognition system  **

## 2022-09-15 PROBLEM — N17.9 AKI (ACUTE KIDNEY INJURY) (HCC): Status: RESOLVED | Noted: 2022-09-09 | Resolved: 2022-09-15

## 2022-09-15 LAB
ANION GAP SERPL CALCULATED.3IONS-SCNC: 5 MMOL/L (ref 4–13)
BUN SERPL-MCNC: 28 MG/DL (ref 5–25)
CALCIUM SERPL-MCNC: 8.6 MG/DL (ref 8.3–10.1)
CHLORIDE SERPL-SCNC: 106 MMOL/L (ref 96–108)
CO2 SERPL-SCNC: 25 MMOL/L (ref 21–32)
CREAT SERPL-MCNC: 1.14 MG/DL (ref 0.6–1.3)
ERYTHROCYTE [DISTWIDTH] IN BLOOD BY AUTOMATED COUNT: 12.7 % (ref 11.6–15.1)
GFR SERPL CREATININE-BSD FRML MDRD: 69 ML/MIN/1.73SQ M
GLUCOSE SERPL-MCNC: 125 MG/DL (ref 65–140)
GLUCOSE SERPL-MCNC: 135 MG/DL (ref 65–140)
GLUCOSE SERPL-MCNC: 186 MG/DL (ref 65–140)
GLUCOSE SERPL-MCNC: 206 MG/DL (ref 65–140)
GLUCOSE SERPL-MCNC: 217 MG/DL (ref 65–140)
HCT VFR BLD AUTO: 27.4 % (ref 36.5–49.3)
HGB BLD-MCNC: 9.6 G/DL (ref 12–17)
MCH RBC QN AUTO: 41.2 PG (ref 26.8–34.3)
MCHC RBC AUTO-ENTMCNC: 35 G/DL (ref 31.4–37.4)
MCV RBC AUTO: 118 FL (ref 82–98)
NRBC BLD AUTO-RTO: 0 /100 WBCS
PLATELET # BLD AUTO: 70 THOUSANDS/UL (ref 149–390)
PMV BLD AUTO: 10 FL (ref 8.9–12.7)
POTASSIUM SERPL-SCNC: 3.9 MMOL/L (ref 3.5–5.3)
RBC # BLD AUTO: 2.33 MILLION/UL (ref 3.88–5.62)
SODIUM SERPL-SCNC: 136 MMOL/L (ref 135–147)
WBC # BLD AUTO: 6.25 THOUSAND/UL (ref 4.31–10.16)

## 2022-09-15 PROCEDURE — 80048 BASIC METABOLIC PNL TOTAL CA: CPT | Performed by: NURSE PRACTITIONER

## 2022-09-15 PROCEDURE — 99239 HOSP IP/OBS DSCHRG MGMT >30: CPT | Performed by: INTERNAL MEDICINE

## 2022-09-15 PROCEDURE — NC001 PR NO CHARGE: Performed by: ORTHOPAEDIC SURGERY

## 2022-09-15 PROCEDURE — 82948 REAGENT STRIP/BLOOD GLUCOSE: CPT

## 2022-09-15 PROCEDURE — 85027 COMPLETE CBC AUTOMATED: CPT | Performed by: NURSE PRACTITIONER

## 2022-09-15 PROCEDURE — 99231 SBSQ HOSP IP/OBS SF/LOW 25: CPT | Performed by: INTERNAL MEDICINE

## 2022-09-15 RX ORDER — ACETAMINOPHEN 325 MG/1
650 TABLET ORAL EVERY 6 HOURS PRN
Refills: 0
Start: 2022-09-15

## 2022-09-15 RX ORDER — FOLIC ACID 1 MG/1
1 TABLET ORAL DAILY
Refills: 0
Start: 2022-09-15

## 2022-09-15 RX ORDER — OXYCODONE HYDROCHLORIDE 5 MG/1
5 TABLET ORAL EVERY 4 HOURS PRN
Qty: 20 TABLET | Refills: 0 | Status: SHIPPED | OUTPATIENT
Start: 2022-09-15 | End: 2022-09-25

## 2022-09-15 RX ORDER — SODIUM CHLORIDE 9 MG/ML
75 INJECTION, SOLUTION INTRAVENOUS CONTINUOUS
Status: DISCONTINUED | OUTPATIENT
Start: 2022-09-15 | End: 2022-09-16

## 2022-09-15 RX ORDER — OXYCODONE HYDROCHLORIDE 5 MG/1
5 TABLET ORAL EVERY 4 HOURS PRN
Qty: 20 TABLET | Refills: 0 | Status: SHIPPED | OUTPATIENT
Start: 2022-09-15 | End: 2022-09-15 | Stop reason: SDUPTHER

## 2022-09-15 RX ADMIN — SODIUM CHLORIDE 75 ML/HR: 0.9 INJECTION, SOLUTION INTRAVENOUS at 18:00

## 2022-09-15 RX ADMIN — VANCOMYCIN HYDROCHLORIDE 1750 MG: 10 INJECTION, POWDER, LYOPHILIZED, FOR SOLUTION INTRAVENOUS at 09:05

## 2022-09-15 RX ADMIN — COLCHICINE 0.6 MG: 0.6 TABLET ORAL at 09:05

## 2022-09-15 RX ADMIN — FOLIC ACID 1 MG: 1 TABLET ORAL at 09:05

## 2022-09-15 RX ADMIN — INSULIN LISPRO 1 UNITS: 100 INJECTION, SOLUTION INTRAVENOUS; SUBCUTANEOUS at 18:00

## 2022-09-15 RX ADMIN — INSULIN LISPRO 1 UNITS: 100 INJECTION, SOLUTION INTRAVENOUS; SUBCUTANEOUS at 12:56

## 2022-09-15 RX ADMIN — OXYCODONE HYDROCHLORIDE 5 MG: 5 TABLET ORAL at 23:17

## 2022-09-15 RX ADMIN — INSULIN LISPRO 1 UNITS: 100 INJECTION, SOLUTION INTRAVENOUS; SUBCUTANEOUS at 21:36

## 2022-09-15 NOTE — PROGRESS NOTES
Orthopedics   Stanislaw Marie 62 y o  male MRN: [id number]  [facility]      Subjective:  62 y o male post operative day 6 right RF I&D  BP was low yesterday so discharge was cancelled  Pain controlled   He feels fine this am       Labs:  0   Lab Value Date/Time    HCT 27 4 (L) 09/15/2022 0559    HCT 29 4 (L) 09/14/2022 0531    HCT 27 6 (L) 09/13/2022 0521    HGB 9 6 (L) 09/15/2022 0559    HGB 10 5 (L) 09/14/2022 0531    HGB 9 7 (L) 09/13/2022 0521    INR 1 13 09/09/2022 0431    WBC 6 25 09/15/2022 0559    WBC 6 07 09/14/2022 0531    WBC 5 77 09/13/2022 0521    ESR 57 (H) 09/08/2022 2032    CRP 39 1 (H) 09/08/2022 2032       Meds:    Current Facility-Administered Medications:     acetaminophen (TYLENOL) tablet 650 mg, 650 mg, Oral, Q6H PRN, Eliberto Aase Derogatis, MD, 650 mg at 09/09/22 1814    aluminum-magnesium hydroxide-simethicone (MYLANTA) oral suspension 30 mL, 30 mL, Oral, Q6H PRN, Eliberto Aase Derogatis, MD    colchicine (COLCRYS) tablet 0 6 mg, 0 6 mg, Oral, Daily, ZAID Waterman, 0 6 mg at 09/15/22 3124    docusate sodium (COLACE) capsule 100 mg, 100 mg, Oral, BID PRN, Eliberto Aase Derogatis, MD    folic acid (FOLVITE) tablet 1 mg, 1 mg, Oral, Daily, Danya bIrahim MD, 1 mg at 09/15/22 0905    insulin lispro (HumaLOG) 100 units/mL subcutaneous injection 1-5 Units, 1-5 Units, Subcutaneous, TID AC, 1 Units at 09/15/22 1800 **AND** Fingerstick Glucose (POCT), , , TID AC, Abby Rollins PA-C    insulin lispro (HumaLOG) 100 units/mL subcutaneous injection 1-5 Units, 1-5 Units, Subcutaneous, HS, Abby Rollins PA-C, 1 Units at 09/15/22 2136    ondansetron (ZOFRAN) injection 4 mg, 4 mg, Intravenous, Q6H PRN, Eliberto Aase Derogatis, MD    oxyCODONE (ROXICODONE) IR tablet 5 mg, 5 mg, Oral, Q4H PRN, Eliberto Aase Derogatis, MD, 5 mg at 09/15/22 2317    sodium chloride 0 9 % infusion, 75 mL/hr, Intravenous, Continuous, Lexii Torre PA-C, Last Rate: 75 mL/hr at 09/16/22 0742, 75 mL/hr at 09/16/22 0742    vancomycin (VANCOCIN) 1,750 mg in sodium chloride 0 9 % 500 mL IVPB, 1,750 mg, Intravenous, Q24H, Atilio Mott MD, Stopped at 09/15/22 1209    Blood Culture:   No results found for: BLOODCX    Wound Culture:   Lab Results   Component Value Date    WOUNDCULT 3+ Growth of Staphylococcus aureus (A) 08/29/2022       Ins and Outs:  I/O last 24 hours: In: 1816 3 [P O :960; I V :856 3]  Out: -           Physical Exam:  Vitals:    09/16/22 0752   BP: 105/67   Pulse: 78   Resp:    Temp: 99 3 °F (37 4 °C)   SpO2: 100%     right Upper Extremity extremity:  Incision C/D/I, no erythema  Swelling of the long and ring fingers  Digits soft and compressible  tender over the PIPJ of ring and long fingers, not tender on the flexor tendon  Able to flex extend at the PIPJ  Motor and Sensation intact to light touch median ulnar and radial nerves  Assessment: 61 y o male post operative day 6 right ring and long finger I&D  Tissue and bone cx grew MSSA  Patient is improved since surgery   VNA set up for home abx    Plan:  Antibiotics per ID - vanco x 28d  Dc today  DVT prophylaxis with SCDs  Analgesics  PT/OT  Dispo: Ortho will follow  Will continue to assess for acute blood loss anemia    Barbara Patiño MD

## 2022-09-15 NOTE — DISCHARGE INSTR - AVS FIRST PAGE
ABX Dosing per Dr Hay Quiroga of ID (see his scripts and instructions on actual medications)--med rec would only allow rounded dosing of 2000 mg but per Dr Oleg Teixeira most recent note: "Vancomycin 1750 mg IV q 24 hours IV"    Contact ID office with any dosing/trough questions

## 2022-09-15 NOTE — PLAN OF CARE
Problem: PAIN - ADULT  Goal: Verbalizes/displays adequate comfort level or baseline comfort level  Description: Interventions:  - Encourage patient to monitor pain and request assistance  - Assess pain using appropriate pain scale  - Administer analgesics based on type and severity of pain and evaluate response  - Implement non-pharmacological measures as appropriate and evaluate response  - Consider cultural and social influences on pain and pain management  - Notify physician/advanced practitioner if interventions unsuccessful or patient reports new pain  Outcome: Progressing     Problem: INFECTION - ADULT  Goal: Absence or prevention of progression during hospitalization  Description: INTERVENTIONS:  - Assess and monitor for signs and symptoms of infection  - Monitor lab/diagnostic results  - Monitor all insertion sites, i e  indwelling lines, tubes, and drains  - [place] appropriate cooling/warming therapies per order  - Administer medications as ordered  - Instruct and encourage patient and family to use good hand hygiene technique  - Identify and instruct in appropriate isolation precautions for identified infection/condition  Outcome: Progressing     Problem: SAFETY ADULT  Goal: Patient will remain free of falls  Description: INTERVENTIONS:  - Educate patient/family on patient safety including physical limitations  - Instruct patient to call for assistance with activity   - Consult OT/PT to assist with strengthening/mobility   - Keep Call bell within reach  - Keep bed low and locked with side rails adjusted as appropriate  - Keep care items and personal belongings within reach  - Initiate and maintain comfort rounds  - Make Fall Risk Sign visible to staff  - Apply yellow socks and bracelet for high fall risk patients  - Consider moving patient to room near nurses station  Outcome: Progressing  Goal: Maintain or return to baseline ADL function  Description: INTERVENTIONS:  -  Assess patient's ability to carry out ADLs; assess patient's baseline for ADL function and identify physical deficits which impact ability to perform ADLs (bathing, care of mouth/teeth, toileting, grooming, dressing, etc )  - Assess/evaluate cause of self-care deficits   - Assess range of motion  - Assess patient's mobility; develop plan if impaired  - Assess patient's need for assistive devices and provide as appropriate  - Encourage maximum independence but intervene and supervise when necessary  - Involve family in performance of ADLs  - Assess for home care needs following discharge   - Consider OT consult to assist with ADL evaluation and planning for discharge  - Provide patient education as appropriate  Outcome: Progressing  Goal: Maintains/Returns to pre admission functional level  Description: INTERVENTIONS:  - Perform BMAT or MOVE assessment daily    - Set and communicate daily mobility goal to care team and patient/family/caregiver     - Collaborate with rehabilitation services on mobility goals if consulted  - Out of bed for toileting  - Record patient progress and toleration of activity level   Outcome: Progressing     Problem: DISCHARGE PLANNING  Goal: Discharge to home or other facility with appropriate resources  Description: INTERVENTIONS:  - Identify barriers to discharge w/patient and caregiver  - Arrange for needed discharge resources and transportation as appropriate  - Identify discharge learning needs (meds, wound care, etc )  - Arrange for interpretive services to assist at discharge as needed  - Refer to Case Management Department for coordinating discharge planning if the patient needs post-hospital services based on physician/advanced practitioner order or complex needs related to functional status, cognitive ability, or social support system  Outcome: Progressing     Problem: Knowledge Deficit  Goal: Patient/family/caregiver demonstrates understanding of disease process, treatment plan, medications, and discharge instructions  Description: Complete learning assessment and assess knowledge base    Interventions:  - Provide teaching at level of understanding  - Provide teaching via preferred learning methods  Outcome: Progressing     Problem: HEMATOLOGIC - ADULT  Goal: Maintains hematologic stability  Description: INTERVENTIONS  - Assess for signs and symptoms of bleeding or hemorrhage  - Monitor labs  - Administer supportive blood products/factors as ordered and appropriate  Outcome: Progressing     Problem: MUSCULOSKELETAL - ADULT  Goal: Maintain or return mobility to safest level of function  Description: INTERVENTIONS:  - Assess patient's ability to carry out ADLs; assess patient's baseline for ADL function and identify physical deficits which impact ability to perform ADLs (bathing, care of mouth/teeth, toileting, grooming, dressing, etc )  - Assess/evaluate cause of self-care deficits   - Assess range of motion  - Assess patient's mobility  - Assess patient's need for assistive devices and provide as appropriate  - Encourage maximum independence but intervene and supervise when necessary  - Involve family in performance of ADLs  - Assess for home care needs following discharge   - Consider OT consult to assist with ADL evaluation and planning for discharge  - Provide patient education as appropriate  Outcome: Progressing  Goal: Maintain proper alignment of affected body part  Description: INTERVENTIONS:  - Support, maintain and protect limb and body alignment  - Provide patient/ family with appropriate education  Outcome: Progressing     Problem: Potential for Falls  Goal: Patient will remain free of falls  Description: INTERVENTIONS:  - Educate patient/family on patient safety including physical limitations  - Instruct patient to call for assistance with activity   - Consult OT/PT to assist with strengthening/mobility   - Keep Call bell within reach  - Keep bed low and locked with side rails adjusted as appropriate  - Keep care items and personal belongings within reach  - Initiate and maintain comfort rounds  - Make Fall Risk Sign visible to staff  - Apply yellow socks and bracelet for high fall risk patients  - Consider moving patient to room near nurses station  Outcome: Progressing

## 2022-09-15 NOTE — PROGRESS NOTES
Vancomycin IV Pharmacy-to-Dose Consultation    Regina Baldwin is a 62 y o  male who is currently receiving Vancomycin IV for treatment of right hand osteomyelitis with management by the Pharmacy Consult service  Assessment/Plan:  The patient was reviewed  Renal function is stable and no signs or symptoms of nephrotoxicity and/or infusion reactions were documented in the chart  Tissue culture shows growth of MSSA  ID is following and recommends to continue IV vancomycin  Plan to continue IV vancomycin through 10/7/22, with home management once discharged  Based on todays assessment, continue current vancomycin (day # 6) dosing of 1750 mg IV q24h, with a plan for trough to be drawn at 0900 on 9/17  We will continue to follow the patients culture results and clinical progress daily      Dorene Mejía, Pharmacist

## 2022-09-15 NOTE — PROGRESS NOTES
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 (MSSA) osteomyelitis S/P I&D day 5 POD  2  Type 2 DM     Recommendations:  Patient is afebrile had a T-max of a 100 1° with normal WBC count  Orthopedic picture of wound 9/15 noted  No evidence of purulence or inflammation  Apparently patient had a transient hypotensive episode followed by malaise and chills  Discharge was canceled  1  Orthopedic wound picture noted 9/15  No evidence of inflammation  2  Continue vancomycin 1750 mg q 24 hours IV with further dosing by pharmacy  Anticipating a 28 day course  3  Prescriptions for outpatient vancomycin IV per 23 more days as well as lab work for weekly CBC, diff, BMP, vancomycin trough, x4 given to   Currently needs 22 more days Rx  4  If patient is discharged he will be followed by orthopedic service as well as ID office     Antibiotics:  1  Vancomycin 1750 mg IV q 24 hours IV, day 6 Rx of 28    Subjective:  He was hypotensive earlier with chills and had T-max of a 100 1°  Currently feels well  Denies nausea, vomiting, or diarrhea  Objective:  Vitals:  Temp:  [98 5 °F (36 9 °C)-100 1 °F (37 8 °C)] 98 5 °F (36 9 °C)  HR:  [70-97] 82  Resp:  [16-18] 16  BP: ()/(52-71) 105/67  SpO2:  [97 %-99 %] 97 %  Temp (24hrs), Av °F (37 2 °C), Min:98 5 °F (36 9 °C), Max:100 1 °F (37 8 °C)  Current: Temperature: 98 5 °F (36 9 °C)    Physical Exam:     General Appearance:  Alert, nontoxic, no acute distress  Throat: Oropharynx moist without lesions    Lips, mucosa, and tongue normal   Neck: Supple, symmetrical, trachea midline, no adenopathy,  no tenderness/mass/nodules   Lungs:   Clear to auscultation bilaterally, no audible wheezes, rhonchi or rales; respirations unlabored   Heart:  Regular rate and rhythm, S1, S2 normal, no murmur, rub or gallop   Abdomen:   Soft, non-tender, non-distended, positive bowel sounds  No masses, no organomegaly    No CVA tenderness   Extremities: Tophaceous gouty arthropathy of fingers and toes, right 4th finger with dry surgical dressing  Picture by orthopedics 9/14 noted without any inflammation or purulence  Skin: Skin color pale, as above  Invasive Devices  Report    Peripherally Inserted Central Catheter Line  Duration           PICC Line 12/87/52 Left Basilic 1 day                Labs, Imaging, & Other studies:   All pertinent labs were personally reviewed  Results from last 7 days   Lab Units 09/15/22  0559 09/14/22  0531 09/13/22  0521   WBC Thousand/uL 6 25 6 07 5 77   HEMOGLOBIN g/dL 9 6* 10 5* 9 7*   PLATELETS Thousands/uL 70* 57* 49*     Results from last 7 days   Lab Units 09/15/22  0559 09/14/22  0531 09/13/22  0521 09/12/22  0601 09/10/22  0600 09/09/22  0431   SODIUM mmol/L 136 133* 135 134*   < > 131*   POTASSIUM mmol/L 3 9 3 9 4 1 4 6   < > 4 3   CHLORIDE mmol/L 106 103 104 106   < > 105   CO2 mmol/L 25 24 26 23   < > 21   BUN mg/dL 28* 26* 27* 28*   < > 33*   CREATININE mg/dL 1 14 0 90 1 02 0 98   < > 2 13*   EGFR ml/min/1 73sq m 69 92 79 83   < > 32   CALCIUM mg/dL 8 6 9 1 8 9 8 9   < > 8 5   AST U/L  --   --   --  39  --  31   ALT U/L  --   --   --  35  --  33   ALK PHOS U/L  --   --   --  60  --  86    < > = values in this interval not displayed       Results from last 7 days   Lab Units 09/10/22  0837 09/10/22  0834   GRAM STAIN RESULT  No Polys or Bacteria seen No Polys or Bacteria seen

## 2022-09-15 NOTE — QUICK NOTE
Seen earlier this AM  DC Written  Patient felt well  Informed by RN after patient was hypotensive (confirmed manually) but asymptomatic  Later, after BP returned to normal, he felt unwell and was c/o dizziness, fatigue and chills  Will hold on dc today, start IVF       Updated CM    Trudy Jorgensen PA-C

## 2022-09-15 NOTE — PLAN OF CARE
Problem: PAIN - ADULT  Goal: Verbalizes/displays adequate comfort level or baseline comfort level  Description: Interventions:  - Encourage patient to monitor pain and request assistance  - Assess pain using appropriate pain scale  - Administer analgesics based on type and severity of pain and evaluate response  - Implement non-pharmacological measures as appropriate and evaluate response  - Consider cultural and social influences on pain and pain management  - Notify physician/advanced practitioner if interventions unsuccessful or patient reports new pain  Outcome: Progressing     Problem: INFECTION - ADULT  Goal: Absence or prevention of progression during hospitalization  Description: INTERVENTIONS:  - Assess and monitor for signs and symptoms of infection  - Monitor lab/diagnostic results  - Monitor all insertion sites, i e  indwelling lines, tubes, and drains  - [place] appropriate cooling/warming therapies per order  - Administer medications as ordered  - Instruct and encourage patient and family to use good hand hygiene technique  - Identify and instruct in appropriate isolation precautions for identified infection/condition  Outcome: Progressing     Problem: SAFETY ADULT  Goal: Patient will remain free of falls  Description: INTERVENTIONS:  - Educate patient/family on patient safety including physical limitations  - Instruct patient to call for assistance with activity   - Consult OT/PT to assist with strengthening/mobility   - Keep Call bell within reach  - Keep bed low and locked with side rails adjusted as appropriate  - Keep care items and personal belongings within reach  - Initiate and maintain comfort rounds  - Make Fall Risk Sign visible to staff  - Apply yellow socks and bracelet for high fall risk patients  - Consider moving patient to room near nurses station  Outcome: Progressing  Goal: Maintain or return to baseline ADL function  Description: INTERVENTIONS:  -  Assess patient's ability to carry out ADLs; assess patient's baseline for ADL function and identify physical deficits which impact ability to perform ADLs (bathing, care of mouth/teeth, toileting, grooming, dressing, etc )  - Assess/evaluate cause of self-care deficits   - Assess range of motion  - Assess patient's mobility; develop plan if impaired  - Assess patient's need for assistive devices and provide as appropriate  - Encourage maximum independence but intervene and supervise when necessary  - Involve family in performance of ADLs  - Assess for home care needs following discharge   - Consider OT consult to assist with ADL evaluation and planning for discharge  - Provide patient education as appropriate  Outcome: Progressing  Goal: Maintains/Returns to pre admission functional level  Description: INTERVENTIONS:  - Perform BMAT or MOVE assessment daily    - Set and communicate daily mobility goal to care team and patient/family/caregiver     - Collaborate with rehabilitation services on mobility goals if consulted  - Out of bed for toileting  - Record patient progress and toleration of activity level   Outcome: Progressing     Problem: DISCHARGE PLANNING  Goal: Discharge to home or other facility with appropriate resources  Description: INTERVENTIONS:  - Identify barriers to discharge w/patient and caregiver  - Arrange for needed discharge resources and transportation as appropriate  - Identify discharge learning needs (meds, wound care, etc )  - Arrange for interpretive services to assist at discharge as needed  - Refer to Case Management Department for coordinating discharge planning if the patient needs post-hospital services based on physician/advanced practitioner order or complex needs related to functional status, cognitive ability, or social support system  Outcome: Progressing     Problem: Knowledge Deficit  Goal: Patient/family/caregiver demonstrates understanding of disease process, treatment plan, medications, and discharge instructions  Description: Complete learning assessment and assess knowledge base    Interventions:  - Provide teaching at level of understanding  - Provide teaching via preferred learning methods  Outcome: Progressing     Problem: HEMATOLOGIC - ADULT  Goal: Maintains hematologic stability  Description: INTERVENTIONS  - Assess for signs and symptoms of bleeding or hemorrhage  - Monitor labs  - Administer supportive blood products/factors as ordered and appropriate  Outcome: Progressing     Problem: MUSCULOSKELETAL - ADULT  Goal: Maintain or return mobility to safest level of function  Description: INTERVENTIONS:  - Assess patient's ability to carry out ADLs; assess patient's baseline for ADL function and identify physical deficits which impact ability to perform ADLs (bathing, care of mouth/teeth, toileting, grooming, dressing, etc )  - Assess/evaluate cause of self-care deficits   - Assess range of motion  - Assess patient's mobility  - Assess patient's need for assistive devices and provide as appropriate  - Encourage maximum independence but intervene and supervise when necessary  - Involve family in performance of ADLs  - Assess for home care needs following discharge   - Consider OT consult to assist with ADL evaluation and planning for discharge  - Provide patient education as appropriate  Outcome: Progressing  Goal: Maintain proper alignment of affected body part  Description: INTERVENTIONS:  - Support, maintain and protect limb and body alignment  - Provide patient/ family with appropriate education  Outcome: Progressing     Problem: Potential for Falls  Goal: Patient will remain free of falls  Description: INTERVENTIONS:  - Educate patient/family on patient safety including physical limitations  - Instruct patient to call for assistance with activity   - Consult OT/PT to assist with strengthening/mobility   - Keep Call bell within reach  - Keep bed low and locked with side rails adjusted as appropriate  - Keep care items and personal belongings within reach  - Initiate and maintain comfort rounds  - Make Fall Risk Sign visible to staff  - Apply yellow socks and bracelet for high fall risk patients  - Consider moving patient to room near nurses station  Outcome: Progressing

## 2022-09-15 NOTE — ASSESSMENT & PLAN NOTE
Noted on the right hand   · Typically maintained on indomethacin and colchicine which was held secondary to LEX  · Completed course of prednisone  · Renal function has been stable, can resume colchicine    · Additional plan as above

## 2022-09-15 NOTE — ASSESSMENT & PLAN NOTE
POA   Unclear etiology  He does drink two glasses of wine daily, no known cirrhosis  · Pt had thrombocytopenia on labs from August of 2020, but appears to likely be related to acute illness requiring surgical resection of bowel at that time, platelet count then normalized   · Hematology following  · HIV, hepatitis panel negative  · Reticulocyte, LDH normal     · Labs do show B12 and folate deficiency, currently on supplementation  · Abdominal ultrasound negative other than gallstones  · Stable for discharge from a hematology standpoint  Plan to follow-up with [facility], Dr Missy Urbina  Continue folic acid PO on d/c, no b12

## 2022-09-15 NOTE — ASSESSMENT & PLAN NOTE
Pt presented with right hand tophi gout with swelling and pain   · Ortho following:  · MRI obtained - Destructive change of the distal aspect of the 4th digit middle phalanx and base of the 4th digit distal phalanx with adjacent joint effusion  Trace tenosynovitis of the 4th digit flexor tendon at level of base of 4th digit middle phalanx likely inflammatory   Infectious tenosynovitis is less likely given appearance   · S/p Bactrim as an outpatient without improvement -- wound culture from 8/31 with Staph aureus and operative culture from 9/10 also growing Staph aureus     · S/p debridement/wash out with hand surgery on 9/10  · On intravenous vancomycin day 6 of 28 (thru 10/7)  · PICC line placed 9/13   · Pain control  · Case management has arranged home IV abx

## 2022-09-15 NOTE — DISCHARGE SUMMARY
[facility]  Discharge- [place] Aroldo 1960, 62 y o  male MRN: [id number]  Unit/Bed#: [facility] 623-01 Encounter: [id number]  Primary Care Provider: Mya Dixon MD   Date and time admitted to hospital: 9/8/2022  7:47 PM    * Tenosynovitis of right hand  Assessment & Plan  Pt presented with right hand tophi gout with swelling and pain   · Ortho following:  · MRI obtained - Destructive change of the distal aspect of the 4th digit middle phalanx and base of the 4th digit distal phalanx with adjacent joint effusion  Trace tenosynovitis of the 4th digit flexor tendon at level of base of 4th digit middle phalanx likely inflammatory  Infectious tenosynovitis is less likely given appearance   · S/p Bactrim as an outpatient without improvement -- wound culture from 8/31 with Staph aureus and operative culture from 9/10 also growing Staph aureus     · S/p debridement/wash out with hand surgery on 9/10  · On intravenous vancomycin day 6 of 28 (thru 10/7)  · PICC line placed 9/13   · Pain control  · Case management has arranged home IV abx    Idiopathic chronic gout of multiple sites with tophus  Assessment & Plan  Noted on the right hand   · Typically maintained on indomethacin and colchicine which was held secondary to LEX  · Completed course of prednisone  · Renal function has been stable, can resume colchicine  · Additional plan as above     Murmur  Assessment & Plan  · Faint Murmur noted on exam     · Echo without any significant valvular abnormalities, normal EF  Thrombocytopenia ([address] )  Assessment & Plan  POA  Unclear etiology  He does drink two glasses of wine daily, no known cirrhosis  · Pt had thrombocytopenia on labs from August of 2020, but appears to likely be related to acute illness requiring surgical resection of bowel at that time, platelet count then normalized   · Hematology following  · HIV, hepatitis panel negative      · Reticulocyte, LDH normal     · Labs do show B12 and folate deficiency, currently on supplementation  · Abdominal ultrasound negative other than gallstones  · Stable for discharge from a hematology standpoint  Plan to follow-up with Dr Keegan Landry  Continue folic acid PO on d/c, no b12  Controlled type 2 diabetes mellitus, without long-term current use of insulin ([facility] [address] )  Assessment & Plan  Hyperglycemia noted on labs  A1c 6 8  Not on Rx at baseline  · Was on prednisone for gout flare, now off    · Continue SSI  · Recommend close outpatient PCP follow-up    LEX (acute kidney injury) (HCC)-resolved as of 9/15/2022  Assessment & Plan  POA, Cr  2 18  · Per review of outpatient records in care everywhere, most recent Cr noted to be 0 7  · Likely in setting of volume depletion and dehydration from poor PO intake prior to admission  · Resolved with intravenous hydration    Hyponatremia-resolved as of 9/13/2022  Assessment & Plan  · Resolved with intravenous fluids      Medical Problems             Resolved Problems  Date Reviewed: 9/15/2022          Resolved    Hyponatremia 9/13/2022     Resolved by  Oneil Gan PA-C    LEX (acute kidney injury) ([facility] ) 9/15/2022     Resolved by  Oneil Gan PA-C              Discharging Physician / Practitioner: Oneil Gan PA-C  PCP: Odalys Meyer MD  Admission Date:   Admission Orders (From admission, onward)     Ordered        09/09/22 0124  Inpatient Admission  Once                      Discharge Date: 09/15/22    Consultations During Hospital Stay:  · ID  · Hematology  · Orthopedics    Procedures Performed:   · 9/10: DEBRIDEMENT HAND/FINGER ([address]) (Right) Local skin advancement for wound coverage  · Anaerobic culture: negative  · Tissue culture: staph auerus    Significant Findings / Test Results:   · See above    Incidental Findings:   · See above     Test Results Pending at Discharge (will require follow up):   · none     Outpatient Tests Requested:  · F/u hematology at [facility]  · F/u PCP  · F/u ID    Complications:  none    Reason for Admission: Abscess R ring finger    Hospital Course:   Ruiz Mace is a 62 y o  male patient medical history of gout, type 2 diabetes, heart murmur, thrombocytopenia who originally presented to the hospital on 9/8/2022 due to right hand tophi with swelling and pain  MRI obtained, status post OR for debridement/washout on 09/10  Was also seen in consultation by Infectious Disease, recommended to have IV antibiotics through 10/7  PICC line was placed on 09/13  Patient will have VNA arranged  For his thrombocytopenia, basic workup here was negative and he was seen by hematology in consult  He was recommended to be on a folic acid supplement and follow up with his primary hematologist at [facility]     Please see above list of diagnoses and related plan for additional information  Condition at Discharge: stable    Discharge Day Visit / Exam:   Subjective:  Doing okay  Hand feels okay  No other complaints  Vitals: Blood Pressure: 101/71 (09/15/22 0001)  Pulse: 70 (09/15/22 0001)  Temperature: 98 8 °F (37 1 °C) (09/15/22 0001)  Temp Source: Temporal (09/10/22 0930)  Respirations: 18 (09/15/22 0001)  Height: 5' 11" (180 3 cm) (09/13/22 0932)  Weight - Scale: 70 8 kg (156 lb) (09/13/22 0932)  SpO2: 99 % (09/15/22 0001)  Exam:   Physical Exam  Vitals and nursing note reviewed  Constitutional:       General: He is not in acute distress  Cardiovascular:      Rate and Rhythm: Normal rate and regular rhythm  Pulmonary:      Effort: No respiratory distress  Abdominal:      General: There is no distension  Musculoskeletal:      Comments: R hand wrapped   Neurological:      Mental Status: He is oriented to person, place, and time  Psychiatric:         Mood and Affect: Mood normal          Discussion with Family: Patient declined call to        Discharge instructions/Information to patient and family:   See after visit summary for information provided to patient and family  Provisions for Follow-Up Care:  See after visit summary for information related to follow-up care and any pertinent home health orders  Disposition:   Home with VNA Services (Reminder: Complete face to face encounter)    Planned Readmission: no     Discharge Statement:  I spent 34 minutes discharging the patient  This time was spent on the day of discharge  I had direct contact with the patient on the day of discharge  Greater than 50% of the total time was spent examining patient, answering all patient questions, arranging and discussing plan of care with patient as well as directly providing post-discharge instructions  Additional time then spent on discharge activities  Discharge Medications:  See after visit summary for reconciled discharge medications provided to patient and/or family        **Please Note: This note may have been constructed using a voice recognition system**

## 2022-09-16 ENCOUNTER — TELEPHONE (OUTPATIENT)
Dept: OBGYN CLINIC | Facility: CLINIC | Age: 62
End: 2022-09-16

## 2022-09-16 VITALS
TEMPERATURE: 99.3 F | HEIGHT: 71 IN | OXYGEN SATURATION: 100 % | SYSTOLIC BLOOD PRESSURE: 105 MMHG | RESPIRATION RATE: 16 BRPM | HEART RATE: 78 BPM | DIASTOLIC BLOOD PRESSURE: 67 MMHG | BODY MASS INDEX: 21.84 KG/M2 | WEIGHT: 156 LBS

## 2022-09-16 LAB
ANION GAP SERPL CALCULATED.3IONS-SCNC: 7 MMOL/L (ref 4–13)
BUN SERPL-MCNC: 23 MG/DL (ref 5–25)
CALCIUM SERPL-MCNC: 8.5 MG/DL (ref 8.3–10.1)
CHLORIDE SERPL-SCNC: 109 MMOL/L (ref 96–108)
CO2 SERPL-SCNC: 21 MMOL/L (ref 21–32)
CREAT SERPL-MCNC: 1.11 MG/DL (ref 0.6–1.3)
GFR SERPL CREATININE-BSD FRML MDRD: 71 ML/MIN/1.73SQ M
GLUCOSE SERPL-MCNC: 107 MG/DL (ref 65–140)
GLUCOSE SERPL-MCNC: 130 MG/DL (ref 65–140)
GLUCOSE SERPL-MCNC: 185 MG/DL (ref 65–140)
POTASSIUM SERPL-SCNC: 3.9 MMOL/L (ref 3.5–5.3)
SODIUM SERPL-SCNC: 137 MMOL/L (ref 135–147)

## 2022-09-16 PROCEDURE — NC001 PR NO CHARGE: Performed by: SURGERY

## 2022-09-16 PROCEDURE — 99239 HOSP IP/OBS DSCHRG MGMT >30: CPT | Performed by: INTERNAL MEDICINE

## 2022-09-16 PROCEDURE — 82948 REAGENT STRIP/BLOOD GLUCOSE: CPT

## 2022-09-16 PROCEDURE — 80048 BASIC METABOLIC PNL TOTAL CA: CPT | Performed by: INTERNAL MEDICINE

## 2022-09-16 RX ADMIN — INSULIN LISPRO 1 UNITS: 100 INJECTION, SOLUTION INTRAVENOUS; SUBCUTANEOUS at 12:01

## 2022-09-16 RX ADMIN — SODIUM CHLORIDE 75 ML/HR: 0.9 INJECTION, SOLUTION INTRAVENOUS at 07:42

## 2022-09-16 RX ADMIN — COLCHICINE 0.6 MG: 0.6 TABLET ORAL at 09:11

## 2022-09-16 RX ADMIN — FOLIC ACID 1 MG: 1 TABLET ORAL at 09:11

## 2022-09-16 RX ADMIN — VANCOMYCIN HYDROCHLORIDE 1750 MG: 10 INJECTION, POWDER, LYOPHILIZED, FOR SOLUTION INTRAVENOUS at 09:11

## 2022-09-16 NOTE — PLAN OF CARE
Problem: PAIN - ADULT  Goal: Verbalizes/displays adequate comfort level or baseline comfort level  Description: Interventions:  - Encourage patient to monitor pain and request assistance  - Assess pain using appropriate pain scale  - Administer analgesics based on type and severity of pain and evaluate response  - Implement non-pharmacological measures as appropriate and evaluate response  - Consider cultural and social influences on pain and pain management  - Notify physician/advanced practitioner if interventions unsuccessful or patient reports new pain  Outcome: Progressing     Problem: INFECTION - ADULT  Goal: Absence or prevention of progression during hospitalization  Description: INTERVENTIONS:  - Assess and monitor for signs and symptoms of infection  - Monitor lab/diagnostic results  - Monitor all insertion sites, i e  indwelling lines, tubes, and drains  - [place] appropriate cooling/warming therapies per order  - Administer medications as ordered  - Instruct and encourage patient and family to use good hand hygiene technique  - Identify and instruct in appropriate isolation precautions for identified infection/condition  Outcome: Progressing     Problem: SAFETY ADULT  Goal: Patient will remain free of falls  Description: INTERVENTIONS:  - Educate patient/family on patient safety including physical limitations  - Instruct patient to call for assistance with activity   - Consult OT/PT to assist with strengthening/mobility   - Keep Call bell within reach  - Keep bed low and locked with side rails adjusted as appropriate  - Keep care items and personal belongings within reach  - Initiate and maintain comfort rounds  - Make Fall Risk Sign visible to staff  - Apply yellow socks and bracelet for high fall risk patients  - Consider moving patient to room near nurses station  Outcome: Progressing       Problem: DISCHARGE PLANNING  Goal: Discharge to home or other facility with appropriate resources  Description: INTERVENTIONS:  - Identify barriers to discharge w/patient and caregiver  - Arrange for needed discharge resources and transportation as appropriate  - Identify discharge learning needs (meds, wound care, etc )  - Arrange for interpretive services to assist at discharge as needed  - Refer to Case Management Department for coordinating discharge planning if the patient needs post-hospital services based on physician/advanced practitioner order or complex needs related to functional status, cognitive ability, or social support system  Outcome: Progressing

## 2022-09-16 NOTE — TELEPHONE ENCOUNTER
----- Message from Oanh Blackburn sent at 9/16/2022 12:18 PM EDT -----  Regarding: FW: namrata    ----- Message -----  From: Emir Aguiar MD  Sent: 9/15/2022   6:48 AM EDT  To: [facility]  Subject: appnt                                            Please schedule an appnt early next week with Dr Sara Blanchard  He is s/p I&D of his long and index fingers 5 days ago

## 2022-09-16 NOTE — PROGRESS NOTES
Vancomycin IV Pharmacy-to-Dose Consultation    Isidro Keller is a 62 y o  male who is currently receiving Vancomycin IV for treatment of right hand osteomyelitis with management by the Pharmacy Consult service  Assessment/Plan:  The patient was reviewed  Renal function is stable and no signs or symptoms of nephrotoxicity and/or infusion reactions were documented in the chart  Tissue culture shows growth of MSSA  ID is following and recommends to continue IV vancomycin through 10/7, with home management once discharged  Based on todays assessment, continue current vancomycin (day # 7) dosing of 1750 mg IV q24h, with a plan for trough to be drawn at 0800 on 9/17  Plan to target a trough of 15-20  We will continue to follow the patients culture results and clinical progress daily      Tanvir Webster, Pharmacist

## 2022-09-16 NOTE — DISCHARGE SUMMARY
[address] [facility]  Discharge- Fatmata Kaur 1960, 62 y o  male MRN: [id number]  Unit/Bed#: [facility] 623-01 Encounter: [id number]  Primary Care Provider: José Luis Lorenzo MD   Date and time admitted to hospital: 9/8/2022  7:47 PM     * Tenosynovitis of right hand  Assessment & Plan  Pt presented with right hand tophi gout with swelling and pain   · Ortho following:  ? MRI obtained - Destructive change of the distal aspect of the 4th digit middle phalanx and base of the 4th digit distal phalanx with adjacent joint effusion  Trace tenosynovitis of the 4th digit flexor tendon at level of base of 4th digit middle phalanx likely inflammatory  Infectious tenosynovitis is less likely given appearance   ? S/p Bactrim as an outpatient without improvement -- wound culture from 8/31 with Staph aureus and operative culture from 9/10 also growing Staph aureus     ? S/p debridement/wash out with hand surgery on 9/10  ? On intravenous vancomycin day 7 of 28 (thru 10/7)  ? PICC line placed 9/13   ? Pain control  ? Case management has arranged home IV abx  DC was held on 9/15 secondary to patient feeling somewhat hypotensive--has improved     Idiopathic chronic gout of multiple sites with tophus  Assessment & Plan  Noted on the right hand   · Typically maintained on indomethacin and colchicine which was held secondary to LEX  · Completed course of prednisone  · Renal function has been stable, can resume colchicine  · Additional plan as above      Murmur  Assessment & Plan  · Faint Murmur noted on exam     · Echo without any significant valvular abnormalities, normal EF      Thrombocytopenia (HCC)  Assessment & Plan  POA  Unclear etiology  He does drink two glasses of wine daily, no known cirrhosis    · Pt had thrombocytopenia on labs from August of 2020, but appears to likely be related to acute illness requiring surgical resection of bowel at that time, platelet count then normalized · Hematology following  ? HIV, hepatitis panel negative  ? Reticulocyte, LDH normal     ? Labs do show B12 and folate deficiency, currently on supplementation  · Abdominal ultrasound negative other than gallstones  · Stable for discharge from a hematology standpoint  Plan to follow-up with Dr Rachael Landry  Continue folic acid PO on d/c, no b12      Controlled type 2 diabetes mellitus, without long-term current use of insulin (HCC)  Assessment & Plan  Hyperglycemia noted on labs  A1c 6 8  Not on Rx at baseline  · Was on prednisone for gout flare, now off    · Continue SSI  · Recommend close outpatient PCP follow-up     LEX (acute kidney injury) (HCC)-resolved as of 9/15/2022  Assessment & Plan  POA, Cr  2 18  · Per review of outpatient records in care everywhere, most recent Cr noted to be 0 7  · Likely in setting of volume depletion and dehydration from poor PO intake prior to admission  · Resolved with intravenous hydration     Hyponatremia-resolved as of 9/13/2022  Assessment & Plan  · Resolved with intravenous fluids            Medical Problems                            Resolved Problems  Date Reviewed: 9/15/2022                    Resolved      Hyponatremia 9/13/2022        Resolved by  Trudy Jorgensen PA-C      LEX (acute kidney injury) ([facility] [address] ) 9/15/2022        Resolved by  Trudy Jorgensen PA-C                  Discharging Physician / Practitioner: Trudy Jorgensen PA-C  PCP: Katie Tobias MD  Admission Date:   Admission Orders (From admission, onward)              Ordered          09/09/22 0124   Inpatient Admission  Once                          Discharge Date: 09/16/22     Consultations During Hospital Stay:  · ID  · Hematology  · Orthopedics     Procedures Performed:   · 9/10: DEBRIDEMENT HAND/FINGER ([address]) (Right) Local skin advancement for wound coverage  · Anaerobic culture: negative  · Tissue culture: staph auerus     Significant Findings / Test Results:   · See above     Incidental Findings:   · See above      Test Results Pending at Discharge (will require follow up):   · none     Outpatient Tests Requested:  · F/u hematology at [facility]  · F/u PCP  · F/u ID     Complications:  none     Reason for Admission: Abscess R ring finger     Hospital Course:   Eloy Hemphill is a 62 y o  male patient medical history of gout, type 2 diabetes, heart murmur, thrombocytopenia who originally presented to the hospital on 9/8/2022 due to right hand tophi with swelling and pain  MRI obtained, status post OR for debridement/washout on 09/10  Was also seen in consultation by Infectious Disease, recommended to have IV antibiotics through 10/7  PICC line was placed on 09/13  Patient will have VNA arranged  For his thrombocytopenia, basic workup here was negative and he was seen by hematology in consult  He was recommended to be on a folic acid supplement and follow up with his primary hematologist at [facility]      Please see above list of diagnoses and related plan for additional information       Condition at Discharge: stable     Discharge Day Visit / Exam:   Subjective:  Doing okay  Hand feels okay  No other complaints       Vitals: Blood Pressure: 105/67  Pulse: 78   Temperature: 99 3  Respirations: 16  Height: 5' 11" (180 3 cm) (09/13/22 0932)  Weight - Scale: 70 8 kg (156 lb) (09/13/22 0932)  SpO2: 100  Exam:   Physical Exam  Vitals and nursing note reviewed  Constitutional:       General: He is not in acute distress  Cardiovascular:      Rate and Rhythm: Normal rate and regular rhythm  Pulmonary:      Effort: No respiratory distress  Abdominal:      General: There is no distension  Musculoskeletal:      Comments: R hand wrapped   Neurological:      Mental Status: He is oriented to person, place, and time     Psychiatric:         Mood and Affect: Mood normal             Discussion with Family: Patient declined call to        Discharge instructions/Information to patient and family:   See after visit summary for information provided to patient and family        Provisions for Follow-Up Care:  See after visit summary for information related to follow-up care and any pertinent home health orders  Disposition:   Home with VNA Services (Reminder: Complete face to face encounter)     Planned Readmission: no     Discharge Statement:  I spent 34 minutes discharging the patient  This time was spent on the day of discharge  I had direct contact with the patient on the day of discharge  Greater than 50% of the total time was spent examining patient, answering all patient questions, arranging and discussing plan of care with patient as well as directly providing post-discharge instructions    Additional time then spent on discharge activities      Discharge Medications:  See after visit summary for reconciled discharge medications provided to patient and/or family        **Please Note: This note may have been constructed using a voice recognition system**

## 2022-09-19 ENCOUNTER — TELEPHONE (OUTPATIENT)
Dept: INFECTIOUS DISEASES | Facility: CLINIC | Age: 62
End: 2022-09-19

## 2022-09-19 DIAGNOSIS — L08.9 INFECTION OF HAND: ICD-10-CM

## 2022-09-19 DIAGNOSIS — M65.9 TENOSYNOVITIS OF RIGHT HAND: Primary | ICD-10-CM

## 2022-09-19 NOTE — TELEPHONE ENCOUNTER
Tiara Pritchard from [address] [place] [address] [place] calls today regarding pts labs  She states that pt needs that lab orders faxed to the lab so pt can get labs drawn tomorrow  Tiara Pritchard states that pt doses vanco is at 1PM and pt will be at the lab between 12:00 and 12:30PM to have labs drawn  Pt will be going to the [facility] in [facility] to have labs drawn  Labs faxed to [phone]

## 2022-09-21 ENCOUNTER — TELEPHONE (OUTPATIENT)
Dept: PALLIATIVE MEDICINE | Facility: CLINIC | Age: 62
End: 2022-09-21

## 2022-09-21 DIAGNOSIS — M65.9 TENOSYNOVITIS OF RIGHT HAND: Primary | ICD-10-CM

## 2022-09-21 DIAGNOSIS — R79.89 ELEVATED SERUM CREATININE: ICD-10-CM

## 2022-09-21 NOTE — PROGRESS NOTES
Dr Mery Parr called the office after talking with patient  Patient gave his dose today, will hold further doses  Tomorrow between 12-1, he will go to the local urgent care to have labs drawn  Contacted patient per Dr Mery Parr to let him know he could go to the local [facility] urgent care between  and that the orders are placed in the system  Patient verbalizes understanding  Notified Lyndsey Britton of this plan

## 2022-09-21 NOTE — TELEPHONE ENCOUNTER
Called [facility] and updated them on the labs for the patient   The pt also requested that Maria Isabel Maki come to his home to add extension to his line so that they can put one on tomorrow

## 2022-09-22 ENCOUNTER — TELEPHONE (OUTPATIENT)
Dept: INFECTIOUS DISEASES | Facility: CLINIC | Age: 62
End: 2022-09-22

## 2022-09-22 NOTE — TELEPHONE ENCOUNTER
Gianna Hu from [facility] calls office today to let us know that pt was unable to get labs drawn due to the lab being closed  She also would like to know when the stitches can come out and who will be taking them out  Called and spoke to pt today  Pt stated he will be going to the lab between 9 & 10 tomorrow morning  Informed pt that he needed to contact his ortho doctor regarding the stitches  Informed pt that once the labs are resulted we will contact him with what is going to happen with his antibiotic

## 2022-09-23 ENCOUNTER — APPOINTMENT (OUTPATIENT)
Dept: LAB | Facility: CLINIC | Age: 62
End: 2022-09-23
Payer: MEDICARE

## 2022-09-23 DIAGNOSIS — L08.9: ICD-10-CM

## 2022-09-23 DIAGNOSIS — M10.9 GOUT: ICD-10-CM

## 2022-09-23 DIAGNOSIS — S60.519A: ICD-10-CM

## 2022-09-23 DIAGNOSIS — R79.89 ELEVATED SERUM CREATININE: ICD-10-CM

## 2022-09-23 DIAGNOSIS — M65.9 TENOSYNOVITIS OF RIGHT HAND: ICD-10-CM

## 2022-09-23 DIAGNOSIS — M65.9 TENOSYNOVITIS OF RIGHT HAND: Primary | ICD-10-CM

## 2022-09-23 LAB
ANION GAP SERPL CALCULATED.3IONS-SCNC: 8 MMOL/L (ref 4–13)
BASOPHILS # BLD AUTO: 0.08 THOUSANDS/ΜL (ref 0–0.1)
BASOPHILS NFR BLD AUTO: 1 % (ref 0–1)
BUN SERPL-MCNC: 19 MG/DL (ref 5–25)
CALCIUM SERPL-MCNC: 8.4 MG/DL (ref 8.3–10.1)
CHLORIDE SERPL-SCNC: 113 MMOL/L (ref 96–108)
CO2 SERPL-SCNC: 18 MMOL/L (ref 21–32)
CREAT SERPL-MCNC: 1.69 MG/DL (ref 0.6–1.3)
EOSINOPHIL # BLD AUTO: 0.61 THOUSAND/ΜL (ref 0–0.61)
EOSINOPHIL NFR BLD AUTO: 5 % (ref 0–6)
ERYTHROCYTE [DISTWIDTH] IN BLOOD BY AUTOMATED COUNT: 13.7 % (ref 11.6–15.1)
ERYTHROCYTE [SEDIMENTATION RATE] IN BLOOD: 39 MM/HOUR (ref 0–19)
GFR SERPL CREATININE-BSD FRML MDRD: 43 ML/MIN/1.73SQ M
GLUCOSE P FAST SERPL-MCNC: 212 MG/DL (ref 65–99)
HCT VFR BLD AUTO: 24.4 % (ref 36.5–49.3)
HGB BLD-MCNC: 8.2 G/DL (ref 12–17)
IMM GRANULOCYTES # BLD AUTO: 0.14 THOUSAND/UL (ref 0–0.2)
IMM GRANULOCYTES NFR BLD AUTO: 1 % (ref 0–2)
LYMPHOCYTES # BLD AUTO: 1.79 THOUSANDS/ΜL (ref 0.6–4.47)
LYMPHOCYTES NFR BLD AUTO: 13 % (ref 14–44)
MCH RBC QN AUTO: 39.6 PG (ref 26.8–34.3)
MCHC RBC AUTO-ENTMCNC: 33.6 G/DL (ref 31.4–37.4)
MCV RBC AUTO: 118 FL (ref 82–98)
MONOCYTES # BLD AUTO: 1.06 THOUSAND/ΜL (ref 0.17–1.22)
MONOCYTES NFR BLD AUTO: 8 % (ref 4–12)
NEUTROPHILS # BLD AUTO: 10.01 THOUSANDS/ΜL (ref 1.85–7.62)
NEUTS SEG NFR BLD AUTO: 72 % (ref 43–75)
NRBC BLD AUTO-RTO: 0 /100 WBCS
PLATELET # BLD AUTO: 282 THOUSANDS/UL (ref 149–390)
PMV BLD AUTO: 10.8 FL (ref 8.9–12.7)
POTASSIUM SERPL-SCNC: 4.2 MMOL/L (ref 3.5–5.3)
RBC # BLD AUTO: 2.07 MILLION/UL (ref 3.88–5.62)
SODIUM SERPL-SCNC: 139 MMOL/L (ref 135–147)
URATE SERPL-MCNC: 8.7 MG/DL (ref 3.5–8.5)
VANCOMYCIN SERPL-MCNC: 18.9 UG/ML (ref 10–20)
VANCOMYCIN TROUGH SERPL-MCNC: 18.9 UG/ML (ref 10–20)
WBC # BLD AUTO: 13.69 THOUSAND/UL (ref 4.31–10.16)

## 2022-09-23 PROCEDURE — 80202 ASSAY OF VANCOMYCIN: CPT

## 2022-09-23 PROCEDURE — 85025 COMPLETE CBC W/AUTO DIFF WBC: CPT

## 2022-09-23 PROCEDURE — 36415 COLL VENOUS BLD VENIPUNCTURE: CPT

## 2022-09-23 PROCEDURE — 85652 RBC SED RATE AUTOMATED: CPT

## 2022-09-23 PROCEDURE — 80048 BASIC METABOLIC PNL TOTAL CA: CPT

## 2022-09-23 PROCEDURE — 84550 ASSAY OF BLOOD/URIC ACID: CPT

## 2022-09-23 NOTE — TELEPHONE ENCOUNTER
Contacted patient regarding lab values  Some still in process, however some labs are not wnl  Patient is told by this office to HOLD infusion at this time  Dr Raphael Mehta will call patient shortly to discuss plan  Patient scheduled for visit with us next week

## 2022-09-23 NOTE — PROGRESS NOTES
Patient was called to review his laboratory values and further plans  As per prior instruction he has not taken any further vancomycin since his last dose on 09/21/22  He is feeling well without any signs or symptoms of gout, he is urinating normally and his right 4th finger wound wound has been examined by his visiting nurse and is healing well  However, his creatinine is elevated above his baseline currently at 1 69 and his white blood cell count and uric acid are also elevated  His random vancomycin level today is in a good range at 18 5  Patient has been instructed to 1  stop further vancomycin infusions  2  The visiting nurse will discontinue his PICC line during his next visit on 9/26 and take a picture of his wound  3  CBC with diff, BMP, uric acid, and random vancomycin level will be repeated on 9/26 to ensure that his renal function is stabilizing 4  It is assumed that his vancomycin level will still be adequate tomorrow  On 09/25 he will begin doxycycline 100 mg q 12 hours p o  for the next 12 days to complete his 28 day course on 10/7/22 this has been E prescribed to his [facility] pharmacy  5  He is to call immediately if there is any change in his clinical condition    We will contact him with his laboratory values and further instructions on 09/26

## 2022-09-23 NOTE — PROGRESS NOTES
Ordered labs for Monday  Patient will no longer give IV abx, doxy called into [facility]100mg bid x12 days  Patient will have picc removed by Moshe Morris nursing Monday, confirmed with Albaro Gillette at [facility]([phone])  Faxed picc removal to [phone]  Will follow up Monday on this

## 2022-09-26 ENCOUNTER — APPOINTMENT (OUTPATIENT)
Dept: LAB | Facility: CLINIC | Age: 62
End: 2022-09-26
Payer: MEDICARE

## 2022-09-26 DIAGNOSIS — M65.9 TENOSYNOVITIS OF RIGHT HAND: ICD-10-CM

## 2022-09-26 DIAGNOSIS — L08.9 INFECTION OF HAND: ICD-10-CM

## 2022-09-26 DIAGNOSIS — L08.9: ICD-10-CM

## 2022-09-26 DIAGNOSIS — S60.519A: ICD-10-CM

## 2022-09-26 LAB
ANION GAP SERPL CALCULATED.3IONS-SCNC: 7 MMOL/L (ref 4–13)
BASOPHILS # BLD AUTO: 0.14 THOUSANDS/ΜL (ref 0–0.1)
BASOPHILS NFR BLD AUTO: 2 % (ref 0–1)
BUN SERPL-MCNC: 17 MG/DL (ref 5–25)
CALCIUM SERPL-MCNC: 8.4 MG/DL (ref 8.3–10.1)
CHLORIDE SERPL-SCNC: 115 MMOL/L (ref 96–108)
CO2 SERPL-SCNC: 17 MMOL/L (ref 21–32)
CREAT SERPL-MCNC: 1.87 MG/DL (ref 0.6–1.3)
EOSINOPHIL # BLD AUTO: 0.53 THOUSAND/ΜL (ref 0–0.61)
EOSINOPHIL NFR BLD AUTO: 6 % (ref 0–6)
ERYTHROCYTE [DISTWIDTH] IN BLOOD BY AUTOMATED COUNT: 13.9 % (ref 11.6–15.1)
GFR SERPL CREATININE-BSD FRML MDRD: 38 ML/MIN/1.73SQ M
GLUCOSE P FAST SERPL-MCNC: 189 MG/DL (ref 65–99)
HCT VFR BLD AUTO: 26.3 % (ref 36.5–49.3)
HGB BLD-MCNC: 8.5 G/DL (ref 12–17)
IMM GRANULOCYTES # BLD AUTO: 0.13 THOUSAND/UL (ref 0–0.2)
IMM GRANULOCYTES NFR BLD AUTO: 1 % (ref 0–2)
LYMPHOCYTES # BLD AUTO: 1.49 THOUSANDS/ΜL (ref 0.6–4.47)
LYMPHOCYTES NFR BLD AUTO: 16 % (ref 14–44)
MCH RBC QN AUTO: 38.8 PG (ref 26.8–34.3)
MCHC RBC AUTO-ENTMCNC: 32.3 G/DL (ref 31.4–37.4)
MCV RBC AUTO: 120 FL (ref 82–98)
MONOCYTES # BLD AUTO: 0.68 THOUSAND/ΜL (ref 0.17–1.22)
MONOCYTES NFR BLD AUTO: 7 % (ref 4–12)
NEUTROPHILS # BLD AUTO: 6.54 THOUSANDS/ΜL (ref 1.85–7.62)
NEUTS SEG NFR BLD AUTO: 68 % (ref 43–75)
NRBC BLD AUTO-RTO: 0 /100 WBCS
PLATELET # BLD AUTO: 338 THOUSANDS/UL (ref 149–390)
PMV BLD AUTO: 10.1 FL (ref 8.9–12.7)
POTASSIUM SERPL-SCNC: 4.5 MMOL/L (ref 3.5–5.3)
RBC # BLD AUTO: 2.19 MILLION/UL (ref 3.88–5.62)
SODIUM SERPL-SCNC: 139 MMOL/L (ref 135–147)
URATE SERPL-MCNC: 8.2 MG/DL (ref 3.5–8.5)
VANCOMYCIN SERPL-MCNC: 5.4 UG/ML (ref 10–20)
WBC # BLD AUTO: 9.51 THOUSAND/UL (ref 4.31–10.16)

## 2022-09-26 PROCEDURE — 36415 COLL VENOUS BLD VENIPUNCTURE: CPT

## 2022-09-26 PROCEDURE — 84550 ASSAY OF BLOOD/URIC ACID: CPT

## 2022-09-26 PROCEDURE — 80202 ASSAY OF VANCOMYCIN: CPT

## 2022-09-26 PROCEDURE — 80048 BASIC METABOLIC PNL TOTAL CA: CPT

## 2022-09-26 PROCEDURE — 85025 COMPLETE CBC W/AUTO DIFF WBC: CPT

## 2022-09-28 ENCOUNTER — TELEMEDICINE (OUTPATIENT)
Dept: INFECTIOUS DISEASES | Facility: CLINIC | Age: 62
End: 2022-09-28
Payer: MEDICARE

## 2022-09-28 ENCOUNTER — TELEPHONE (OUTPATIENT)
Dept: INFECTIOUS DISEASES | Facility: CLINIC | Age: 62
End: 2022-09-28

## 2022-09-28 DIAGNOSIS — E11.9 CONTROLLED TYPE 2 DIABETES MELLITUS, WITHOUT LONG-TERM CURRENT USE OF INSULIN (HCC): ICD-10-CM

## 2022-09-28 DIAGNOSIS — M1A.09X1 IDIOPATHIC CHRONIC GOUT OF MULTIPLE SITES WITH TOPHUS: Primary | ICD-10-CM

## 2022-09-28 DIAGNOSIS — N17.9 AKI (ACUTE KIDNEY INJURY) (HCC): ICD-10-CM

## 2022-09-28 PROCEDURE — 99443 PR PHYS/QHP TELEPHONE EVALUATION 21-30 MIN: CPT | Performed by: PHYSICIAN ASSISTANT

## 2022-09-28 RX ORDER — DOXYCYCLINE HYCLATE 100 MG
1 TABLET ORAL 2 TIMES DAILY
COMMUNITY
Start: 2022-09-23

## 2022-09-28 NOTE — TELEPHONE ENCOUNTER
Called spoke with pt to inform him that ROYAL would like him to hold Colchicine as this may be causing an increase in his creatinine level  Pt states that he agrees it could have been causing his kidneys problems  He states he will hold it  Made him aware of repeat BMP to get on Monday  To take pic of finger and sent to Dr Talat Coello  Provided him Ortho number so he can schedule follow up with them and get sutures removed

## 2022-09-28 NOTE — PATIENT INSTRUCTIONS
- hold colchicine  - continue doxycycline 100 mg po bid as ordered  - send picture of finger  - BMP on 10/3

## 2022-09-28 NOTE — ASSESSMENT & PLAN NOTE
LEX - likely due to vancomycin  But also on daily colchicine  Suggest holding colchicine and repeat labs on Monday  If Cr remains elevated at that time then refer to Nephrology

## 2022-09-28 NOTE — ASSESSMENT & PLAN NOTE
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

## 2022-09-28 NOTE — PROGRESS NOTES
Virtual Brief Visit    Patient is located in the following state in which I hold an active license [place]      Assessment/Plan:    Patient on doxycycline without difficulty  He states his finger looks good -he has yet to follow up with Ortho and still has sutures in place  Cr remains elevated  He states he is taking colchicine daily  Problem List Items Addressed This Visit        Endocrine    Controlled type 2 diabetes mellitus, without long-term current use of insulin ([facility] [address] )       Lab Results   Component Value Date    HGBA1C 6 8 (H) 09/09/2022               Musculoskeletal and Integument    Idiopathic chronic gout of multiple sites with tophus - Primary     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 with secondary MSSA OM s/p I&D  Patient with increased Cr on Vancomycin which has not been stopped  He is currently on po doxycycline and doing well  Plan  - continue doxycycline to complete at total of 4 weeks   - will need ortho follow up for suture removal  - patient to send picture of finger to Dr Hay Quiroga            Genitourinary    LEX (acute kidney injury) ([facility] [address] )     LEX - likely due to vancomycin  But also on daily colchicine  Suggest holding colchicine and repeat labs on Monday  If Cr remains elevated at that time then refer to Nephrology  Recent Visits  Date Type Provider Dept   09/22/22 Telephone Konstantin Swartz [facility] [place]   Showing recent visits within past 7 days and meeting all other requirements  Today's Visits  Date Type Provider Dept   09/28/22 Telemedicine Dary Teran PA-C [facility] [place]   Showing today's visits and meeting all other requirements  Future Appointments  No visits were found meeting these conditions    Showing future appointments within next 150 days and meeting all other requirements         I spent 30 minutes directly with the patient during this visit

## 2022-09-30 NOTE — TELEPHONE ENCOUNTER
Contacted patient  Phone call went directly to [id number]  Left a reminder for repeat labs and that he would send a picture to us

## 2022-10-03 ENCOUNTER — APPOINTMENT (OUTPATIENT)
Dept: LAB | Facility: CLINIC | Age: 62
End: 2022-10-03
Payer: MEDICARE

## 2022-10-03 DIAGNOSIS — N17.9 AKI (ACUTE KIDNEY INJURY) (HCC): ICD-10-CM

## 2022-10-03 DIAGNOSIS — M1A.09X1 IDIOPATHIC CHRONIC GOUT OF MULTIPLE SITES WITH TOPHUS: ICD-10-CM

## 2022-10-03 LAB
ANION GAP SERPL CALCULATED.3IONS-SCNC: 8 MMOL/L (ref 4–13)
BUN SERPL-MCNC: 28 MG/DL (ref 5–25)
CALCIUM SERPL-MCNC: 8.6 MG/DL (ref 8.3–10.1)
CHLORIDE SERPL-SCNC: 113 MMOL/L (ref 96–108)
CO2 SERPL-SCNC: 17 MMOL/L (ref 21–32)
CREAT SERPL-MCNC: 1.44 MG/DL (ref 0.6–1.3)
GFR SERPL CREATININE-BSD FRML MDRD: 52 ML/MIN/1.73SQ M
GLUCOSE P FAST SERPL-MCNC: 161 MG/DL (ref 65–99)
POTASSIUM SERPL-SCNC: 4.3 MMOL/L (ref 3.5–5.3)
SODIUM SERPL-SCNC: 138 MMOL/L (ref 135–147)

## 2022-10-03 PROCEDURE — 36415 COLL VENOUS BLD VENIPUNCTURE: CPT

## 2022-10-03 PROCEDURE — 80048 BASIC METABOLIC PNL TOTAL CA: CPT

## 2022-10-06 ENCOUNTER — TELEPHONE (OUTPATIENT)
Dept: INFECTIOUS DISEASES | Facility: CLINIC | Age: 62
End: 2022-10-06

## 2022-10-06 NOTE — TELEPHONE ENCOUNTER
Called lmom for pt informing him that creat improving not stabilized yet, continue to follow up with Ortho to have stitches removed and continue follow up with PCP regarding creatinine   Informed him about finishing up on abx on 10/7

## 2023-04-21 NOTE — TELEPHONE ENCOUNTER
Patient is being referred to a orthopedics  Please schedule accordingly      [facility] [address]   [phone]

## (undated) DEVICE — CUFF TOURNIQUET 18 X 4 IN QUICK CONNECT DISP 1 BLADDER

## (undated) DEVICE — INTENDED FOR TISSUE SEPARATION, AND OTHER PROCEDURES THAT REQUIRE A SHARP SURGICAL BLADE TO PUNCTURE OR CUT.: Brand: BARD-PARKER ® CARBON RIB-BACK BLADES

## (undated) DEVICE — DRAPE SHEET THREE QUARTER

## (undated) DEVICE — SUT VICRYL 3-0 SH 27 IN J416H

## (undated) DEVICE — SPONGE PVP SCRUB WING STERILE

## (undated) DEVICE — GLOVE SRG BIOGEL 7

## (undated) DEVICE — SUT MONOCRYL 4-0 PS-2 18 IN Y496G

## (undated) DEVICE — INTENDED FOR TISSUE SEPARATION, AND OTHER PROCEDURES THAT REQUIRE A SHARP SURGICAL BLADE TO PUNCTURE OR CUT.: Brand: BARD-PARKER SAFETY BLADES SIZE 15, STERILE

## (undated) DEVICE — DISPOSABLE EQUIPMENT COVER: Brand: SMALL TOWEL DRAPE

## (undated) DEVICE — CHLORAPREP HI-LITE 26ML ORANGE

## (undated) DEVICE — DRSG LG WND FNGR TOE

## (undated) DEVICE — TUBING SUCTION 5MM X 12 FT

## (undated) DEVICE — CYSTO TUBING SINGLE IRRIGATION

## (undated) DEVICE — STERILE BETHLEHEM PLASTIC HAND: Brand: CARDINAL HEALTH

## (undated) DEVICE — GLOVE INDICATOR PI UNDERGLOVE SZ 7 BLUE

## (undated) DEVICE — OCCLUSIVE GAUZE STRIP,3% BISMUTH TRIBROMOPHENATE IN PETROLATUM BLEND: Brand: XEROFORM

## (undated) DEVICE — GAUZE SPONGES,16 PLY: Brand: CURITY

## (undated) DEVICE — NEEDLE 25G X 1 1/2